# Patient Record
Sex: MALE | Race: WHITE | NOT HISPANIC OR LATINO | Employment: UNEMPLOYED | URBAN - METROPOLITAN AREA
[De-identification: names, ages, dates, MRNs, and addresses within clinical notes are randomized per-mention and may not be internally consistent; named-entity substitution may affect disease eponyms.]

---

## 2019-02-02 ENCOUNTER — PATIENT OUTREACH (OUTPATIENT)
Dept: HEALTH INFORMATION MANAGEMENT | Facility: OTHER | Age: 59
End: 2019-02-02

## 2019-02-02 ENCOUNTER — HOSPITAL ENCOUNTER (EMERGENCY)
Facility: MEDICAL CENTER | Age: 59
End: 2019-02-02
Attending: EMERGENCY MEDICINE
Payer: MEDICAID

## 2019-02-02 VITALS
DIASTOLIC BLOOD PRESSURE: 94 MMHG | BODY MASS INDEX: 22.72 KG/M2 | SYSTOLIC BLOOD PRESSURE: 118 MMHG | HEART RATE: 88 BPM | OXYGEN SATURATION: 90 % | WEIGHT: 167.55 LBS | RESPIRATION RATE: 16 BRPM | TEMPERATURE: 98.2 F

## 2019-02-02 DIAGNOSIS — M1A.9XX1 CHRONIC GOUT WITH TOPHUS, UNSPECIFIED CAUSE, UNSPECIFIED SITE: ICD-10-CM

## 2019-02-02 LAB
ALBUMIN SERPL BCP-MCNC: 4.1 G/DL (ref 3.2–4.9)
ALBUMIN/GLOB SERPL: 1.2 G/DL
ALP SERPL-CCNC: 169 U/L (ref 30–99)
ALT SERPL-CCNC: 16 U/L (ref 2–50)
ANION GAP SERPL CALC-SCNC: 10 MMOL/L (ref 0–11.9)
AST SERPL-CCNC: 16 U/L (ref 12–45)
BASOPHILS # BLD AUTO: 0.3 % (ref 0–1.8)
BASOPHILS # BLD: 0.03 K/UL (ref 0–0.12)
BILIRUB SERPL-MCNC: 0.6 MG/DL (ref 0.1–1.5)
BUN SERPL-MCNC: 16 MG/DL (ref 8–22)
CALCIUM SERPL-MCNC: 10.2 MG/DL (ref 8.5–10.5)
CHLORIDE SERPL-SCNC: 103 MMOL/L (ref 96–112)
CO2 SERPL-SCNC: 21 MMOL/L (ref 20–33)
CREAT SERPL-MCNC: 0.94 MG/DL (ref 0.5–1.4)
EOSINOPHIL # BLD AUTO: 0.17 K/UL (ref 0–0.51)
EOSINOPHIL NFR BLD: 1.7 % (ref 0–6.9)
ERYTHROCYTE [DISTWIDTH] IN BLOOD BY AUTOMATED COUNT: 47.9 FL (ref 35.9–50)
GLOBULIN SER CALC-MCNC: 3.4 G/DL (ref 1.9–3.5)
GLUCOSE SERPL-MCNC: 95 MG/DL (ref 65–99)
HCT VFR BLD AUTO: 41.5 % (ref 42–52)
HGB BLD-MCNC: 14 G/DL (ref 14–18)
IMM GRANULOCYTES # BLD AUTO: 0.07 K/UL (ref 0–0.11)
IMM GRANULOCYTES NFR BLD AUTO: 0.7 % (ref 0–0.9)
LYMPHOCYTES # BLD AUTO: 1.82 K/UL (ref 1–4.8)
LYMPHOCYTES NFR BLD: 18.2 % (ref 22–41)
MCH RBC QN AUTO: 29.4 PG (ref 27–33)
MCHC RBC AUTO-ENTMCNC: 33.7 G/DL (ref 33.7–35.3)
MCV RBC AUTO: 87.2 FL (ref 81.4–97.8)
MONOCYTES # BLD AUTO: 0.55 K/UL (ref 0–0.85)
MONOCYTES NFR BLD AUTO: 5.5 % (ref 0–13.4)
NEUTROPHILS # BLD AUTO: 7.38 K/UL (ref 1.82–7.42)
NEUTROPHILS NFR BLD: 73.6 % (ref 44–72)
NRBC # BLD AUTO: 0 K/UL
NRBC BLD-RTO: 0 /100 WBC
PLATELET # BLD AUTO: 451 K/UL (ref 164–446)
PMV BLD AUTO: 9.6 FL (ref 9–12.9)
POTASSIUM SERPL-SCNC: 4 MMOL/L (ref 3.6–5.5)
PROT SERPL-MCNC: 7.5 G/DL (ref 6–8.2)
RBC # BLD AUTO: 4.76 M/UL (ref 4.7–6.1)
SODIUM SERPL-SCNC: 134 MMOL/L (ref 135–145)
WBC # BLD AUTO: 10 K/UL (ref 4.8–10.8)

## 2019-02-02 PROCEDURE — 80053 COMPREHEN METABOLIC PANEL: CPT

## 2019-02-02 PROCEDURE — 85025 COMPLETE CBC W/AUTO DIFF WBC: CPT

## 2019-02-02 PROCEDURE — 99284 EMERGENCY DEPT VISIT MOD MDM: CPT

## 2019-02-02 PROCEDURE — 36415 COLL VENOUS BLD VENIPUNCTURE: CPT

## 2019-02-02 RX ORDER — INDOMETHACIN 50 MG/1
50 CAPSULE ORAL 3 TIMES DAILY
Qty: 21 CAP | Refills: 0 | Status: SHIPPED | OUTPATIENT
Start: 2019-02-02 | End: 2019-02-09

## 2019-02-02 ASSESSMENT — LIFESTYLE VARIABLES: DO YOU DRINK ALCOHOL: NO

## 2019-02-02 NOTE — DISCHARGE PLANNING
Called by Dr. Rodriguez requesting assistance for pateint to get Medicaid.  Emailed Patient Financial Assistance with patients information.

## 2019-02-02 NOTE — ED NOTES
Financial aide paper work provided to pt.  Discharge instructions given.  All questions answered.  Prescriptions given x1.  Pt to follow-up with PCP.  Pt verbalized understanding.  All belongings with pt.  Pt ambulated to lobby.

## 2019-02-02 NOTE — ED TRIAGE NOTES
Chief Complaint   Patient presents with   • Joint Pain     pt reports to have hx of Gout, reports to have increased symptoms with no relief.    • Weight Loss     25 lbs since Nov     Explained to pt triage process, made pt aware to tell this RN/staff of any changes/concerns, pt verbalized understanding of process and instructions given. Pt to ER lobby.

## 2019-02-02 NOTE — ED PROVIDER NOTES
ED Provider Note      ER PROVIDER NOTE      CHIEF COMPLAINT  Chief Complaint   Patient presents with   • Joint Pain     pt reports to have hx of Gout, reports to have increased symptoms with no relief.    • Weight Loss     25 lbs since Nov       HPI  Marbin Ladd is a 58 y.o. male who presents to the emergency department complaining of joint pain weight loss.  Patient reports that he has long-standing history of gout and seems to be poorly controlled he does not currently have a physician or way to manage it.  He states he had a flare in the spring lost approximately 10 pounds during that flare, had another flare this fall lost approximately 15 pounds then.  These current symptoms began a few weeks ago, primarily right knee pain which is often where he gets the symptoms.  He does state he has swelling to his right elbow and first digit on right hand which is chronic and do not currently bother him.  He denies any fevers or chills, no nausea or vomiting, no abdominal pain no abnormal rashes.  No cough chest pain or shortness of breath    REVIEW OF SYSTEMS  Pertinent positives include joint pain. Pertinent negatives include no fever. See HPI for details. All other systems reviewed and are negative.    PAST MEDICAL HISTORY   has a past medical history of Gout.    SURGICAL HISTORY  patient denies any surgical history    FAMILY HISTORY  No family history on file.    SOCIAL HISTORY  Social History     Social History   • Marital status: Single     Spouse name: N/A   • Number of children: N/A   • Years of education: N/A     Social History Main Topics   • Smoking status: Never Smoker   • Smokeless tobacco: Never Used   • Alcohol use Yes      Comment: rare   • Drug use: Yes     Types: Inhaled      Comment: thc rare   • Sexual activity: Not on file     Other Topics Concern   • Not on file     Social History Narrative   • No narrative on file      History   Drug Use   • Types: Inhaled     Comment: thc rare       CURRENT  MEDICATIONS  Home Medications    **Home medications have not yet been reviewed for this encounter**         ALLERGIES  Allergies   Allergen Reactions   • Vicodin [Hydrocodone-Acetaminophen]        PHYSICAL EXAM  VITAL SIGNS: /97   Pulse 90   Temp 36.8 °C (98.2 °F) (Temporal)   Resp 15   Wt 76 kg (167 lb 8.8 oz)   SpO2 97%   BMI 22.72 kg/m²   Pulse ox interpretation: I interpret this pulse ox as normal.    Constitutional: Alert in no apparent distress.  HENT: No signs of trauma, Bilateral external ears normal, Nose normal.   Eyes: Pupils are equal and reactive, Conjunctiva normal, Non-icteric.   Neck: Normal range of motion, No tenderness, Supple, No stridor.   Lymphatic: No lymphadenopathy noted.   Cardiovascular: Regular rate and rhythm, no murmurs.   Thorax & Lungs: Normal breath sounds, No respiratory distress, No wheezing, No chest tenderness.   Abdomen: Bowel sounds normal, Soft, No tenderness, No masses, No pulsatile masses. No peritoneal signs.  Skin: Warm, Dry, No erythema, No rash.   Back: No bony tenderness, No CVA tenderness.   Extremities: Tophi noted of her right elbow, nontender, no erythema, additional 2 DIP of second digit on right hand, nontender, no erythema, slight effusion to right knee with some associated tenderness, no erythema or excessive warmth intact distal pulses, No edema, No tenderness, No cyanosis, Negative Kathleen's sign.  Musculoskeletal: Good range of motion in all major joints. No tenderness to palpation or major deformities noted.   Neurologic: Alert , Normal motor function, Normal sensory function, No focal deficits noted.   Psychiatric: Affect normal, Judgment normal, Mood normal.     DIAGNOSTIC STUDIES / PROCEDURES        LABS  Labs Reviewed   CBC WITH DIFFERENTIAL - Abnormal; Notable for the following:        Result Value    Hematocrit 41.5 (*)     Platelet Count 451 (*)     Neutrophils-Polys 73.60 (*)     Lymphocytes 18.20 (*)     All other components within normal  limits   COMP METABOLIC PANEL - Abnormal; Notable for the following:     Sodium 134 (*)     Alkaline Phosphatase 169 (*)     All other components within normal limits   ESTIMATED GFR       All labs reviewed by me.    RADIOLOGY  No orders to display     The radiologist's interpretation of all radiological studies have been reviewed by me.    COURSE & MEDICAL DECISION MAKING  Nursing notes, VS, PMSFHx reviewed in chart.    7:33 AM Patient seen and examined at bedside. . Ordered for labs to evaluate his symptoms.     Called case management to arrange his enrollment in Medicaid to facilitate follow-up      Decision Making:  This is a 58 y.o. male presenting with joint pain.  Patient has history of gout and his physical exam and nature of his symptoms is suggestive of gout flare.  I will start him on indomethacin.  He has no fevers or exam findings suggestive of septic arthritis.  He has had some weight loss over the last year, likely due to the fact that when he gets his flares he just lays around in bed he reports.  However more occult causes considered and I detect no emergent cause, arrange for primary care follow-up for the patient   The patient will return for new or worsening symptoms and is stable at the time of discharge.    The patient is referred to a primary physician for blood pressure management, diabetic screening, and for all other preventative health concerns.      DISPOSITION:  Patient will be discharged home in stable condition.    FOLLOW UP:  [address] [address] [phone]    PLease call to schedule a new Primary Care Provider appointment, [facility] has a sliding fee scale thank you       OUTPATIENT MEDICATIONS:  Current Discharge Medication List            FINAL IMPRESSION  1. Chronic gout with tophus, unspecified cause, unspecified site         The note accurately reflects work and decisions made by me.  Umer Rodriguez  2/2/2019  8:59 AM

## 2019-02-18 ENCOUNTER — HOSPITAL ENCOUNTER (INPATIENT)
Facility: MEDICAL CENTER | Age: 59
LOS: 8 days | DRG: 329 | End: 2019-02-26
Attending: EMERGENCY MEDICINE | Admitting: SURGERY
Payer: MEDICAID

## 2019-02-18 ENCOUNTER — APPOINTMENT (OUTPATIENT)
Dept: RADIOLOGY | Facility: MEDICAL CENTER | Age: 59
DRG: 329 | End: 2019-02-18
Attending: EMERGENCY MEDICINE
Payer: MEDICAID

## 2019-02-18 DIAGNOSIS — G89.18 PAIN FOLLOWING SURGERY OR PROCEDURE: ICD-10-CM

## 2019-02-18 DIAGNOSIS — K57.20 DIVERTICULITIS OF LARGE INTESTINE WITH PERFORATION, UNSPECIFIED BLEEDING STATUS: ICD-10-CM

## 2019-02-18 LAB
ALBUMIN SERPL BCP-MCNC: 3.9 G/DL (ref 3.2–4.9)
ALBUMIN/GLOB SERPL: 1.1 G/DL
ALP SERPL-CCNC: 159 U/L (ref 30–99)
ALT SERPL-CCNC: 17 U/L (ref 2–50)
ANION GAP SERPL CALC-SCNC: 13 MMOL/L (ref 0–11.9)
APTT PPP: 32.7 SEC (ref 24.7–36)
AST SERPL-CCNC: 12 U/L (ref 12–45)
BASOPHILS # BLD AUTO: 0.6 % (ref 0–1.8)
BASOPHILS # BLD: 0.13 K/UL (ref 0–0.12)
BILIRUB SERPL-MCNC: 2.3 MG/DL (ref 0.1–1.5)
BUN SERPL-MCNC: 18 MG/DL (ref 8–22)
CALCIUM SERPL-MCNC: 10.7 MG/DL (ref 8.5–10.5)
CHLORIDE SERPL-SCNC: 101 MMOL/L (ref 96–112)
CO2 SERPL-SCNC: 23 MMOL/L (ref 20–33)
CREAT SERPL-MCNC: 0.97 MG/DL (ref 0.5–1.4)
EOSINOPHIL # BLD AUTO: 0 K/UL (ref 0–0.51)
EOSINOPHIL NFR BLD: 0 % (ref 0–6.9)
ERYTHROCYTE [DISTWIDTH] IN BLOOD BY AUTOMATED COUNT: 49.9 FL (ref 35.9–50)
GLOBULIN SER CALC-MCNC: 3.5 G/DL (ref 1.9–3.5)
GLUCOSE SERPL-MCNC: 113 MG/DL (ref 65–99)
GRAM STN SPEC: NORMAL
HCT VFR BLD AUTO: 40.5 % (ref 42–52)
HGB BLD-MCNC: 13.3 G/DL (ref 14–18)
IMM GRANULOCYTES # BLD AUTO: 0.31 K/UL (ref 0–0.11)
IMM GRANULOCYTES NFR BLD AUTO: 1.3 % (ref 0–0.9)
INR PPP: 1.23 (ref 0.87–1.13)
LIPASE SERPL-CCNC: <3 U/L (ref 11–82)
LYMPHOCYTES # BLD AUTO: 1.2 K/UL (ref 1–4.8)
LYMPHOCYTES NFR BLD: 5.1 % (ref 22–41)
MCH RBC QN AUTO: 28.5 PG (ref 27–33)
MCHC RBC AUTO-ENTMCNC: 32.8 G/DL (ref 33.7–35.3)
MCV RBC AUTO: 86.9 FL (ref 81.4–97.8)
MONOCYTES # BLD AUTO: 0.98 K/UL (ref 0–0.85)
MONOCYTES NFR BLD AUTO: 4.2 % (ref 0–13.4)
NEUTROPHILS # BLD AUTO: 20.98 K/UL (ref 1.82–7.42)
NEUTROPHILS NFR BLD: 88.8 % (ref 44–72)
NRBC # BLD AUTO: 0 K/UL
NRBC BLD-RTO: 0 /100 WBC
PLATELET # BLD AUTO: 482 K/UL (ref 164–446)
PMV BLD AUTO: 10 FL (ref 9–12.9)
POTASSIUM SERPL-SCNC: 3.2 MMOL/L (ref 3.6–5.5)
PROT SERPL-MCNC: 7.4 G/DL (ref 6–8.2)
PROTHROMBIN TIME: 15.6 SEC (ref 12–14.6)
RBC # BLD AUTO: 4.66 M/UL (ref 4.7–6.1)
SIGNIFICANT IND 70042: NORMAL
SITE SITE: NORMAL
SODIUM SERPL-SCNC: 137 MMOL/L (ref 135–145)
SOURCE SOURCE: NORMAL
WBC # BLD AUTO: 23.6 K/UL (ref 4.8–10.8)

## 2019-02-18 PROCEDURE — 83690 ASSAY OF LIPASE: CPT

## 2019-02-18 PROCEDURE — C1765 ADHESION BARRIER: HCPCS | Performed by: SURGERY

## 2019-02-18 PROCEDURE — 160041 HCHG SURGERY MINUTES - EA ADDL 1 MIN LEVEL 4: Performed by: SURGERY

## 2019-02-18 PROCEDURE — 700111 HCHG RX REV CODE 636 W/ 250 OVERRIDE (IP): Performed by: EMERGENCY MEDICINE

## 2019-02-18 PROCEDURE — 3E0M05Z INTRODUCTION OF ADHESION BARRIER INTO PERITONEAL CAVITY, OPEN APPROACH: ICD-10-PCS | Performed by: SURGERY

## 2019-02-18 PROCEDURE — 85025 COMPLETE CBC W/AUTO DIFF WBC: CPT

## 2019-02-18 PROCEDURE — 74177 CT ABD & PELVIS W/CONTRAST: CPT

## 2019-02-18 PROCEDURE — 85610 PROTHROMBIN TIME: CPT

## 2019-02-18 PROCEDURE — 501583 HCHG TROCAR, THRD CAN&SEAL 5X100: Performed by: SURGERY

## 2019-02-18 PROCEDURE — 80053 COMPREHEN METABOLIC PANEL: CPT

## 2019-02-18 PROCEDURE — 160029 HCHG SURGERY MINUTES - 1ST 30 MINS LEVEL 4: Performed by: SURGERY

## 2019-02-18 PROCEDURE — 502704 HCHG DEVICE, LIGASURE IMPACT: Performed by: SURGERY

## 2019-02-18 PROCEDURE — A9270 NON-COVERED ITEM OR SERVICE: HCPCS | Performed by: ANESTHESIOLOGY

## 2019-02-18 PROCEDURE — 96375 TX/PRO/DX INJ NEW DRUG ADDON: CPT

## 2019-02-18 PROCEDURE — 160048 HCHG OR STATISTICAL LEVEL 1-5: Performed by: SURGERY

## 2019-02-18 PROCEDURE — 502571 HCHG PACK, LAP CHOLE: Performed by: SURGERY

## 2019-02-18 PROCEDURE — 0DTN0ZZ RESECTION OF SIGMOID COLON, OPEN APPROACH: ICD-10-PCS | Performed by: SURGERY

## 2019-02-18 PROCEDURE — 87075 CULTR BACTERIA EXCEPT BLOOD: CPT

## 2019-02-18 PROCEDURE — 94760 N-INVAS EAR/PLS OXIMETRY 1: CPT

## 2019-02-18 PROCEDURE — 501838 HCHG SUTURE GENERAL: Performed by: SURGERY

## 2019-02-18 PROCEDURE — 501445 HCHG STAPLER, SKIN DISP: Performed by: SURGERY

## 2019-02-18 PROCEDURE — 501572 HCHG TROCAR, SHIELD OBTU 5X100: Performed by: SURGERY

## 2019-02-18 PROCEDURE — 700117 HCHG RX CONTRAST REV CODE 255: Performed by: EMERGENCY MEDICINE

## 2019-02-18 PROCEDURE — 36415 COLL VENOUS BLD VENIPUNCTURE: CPT

## 2019-02-18 PROCEDURE — 160035 HCHG PACU - 1ST 60 MINS PHASE I: Performed by: SURGERY

## 2019-02-18 PROCEDURE — 700102 HCHG RX REV CODE 250 W/ 637 OVERRIDE(OP): Performed by: ANESTHESIOLOGY

## 2019-02-18 PROCEDURE — 700101 HCHG RX REV CODE 250

## 2019-02-18 PROCEDURE — 99291 CRITICAL CARE FIRST HOUR: CPT

## 2019-02-18 PROCEDURE — 88307 TISSUE EXAM BY PATHOLOGIST: CPT

## 2019-02-18 PROCEDURE — 160036 HCHG PACU - EA ADDL 30 MINS PHASE I: Performed by: SURGERY

## 2019-02-18 PROCEDURE — 500868 HCHG NEEDLE, SURGI(VARES): Performed by: SURGERY

## 2019-02-18 PROCEDURE — 160022 HCHG BLOCK: Performed by: SURGERY

## 2019-02-18 PROCEDURE — 96376 TX/PRO/DX INJ SAME DRUG ADON: CPT

## 2019-02-18 PROCEDURE — 501452 HCHG STAPLES, GIA MULTIFIRE 60/80: Performed by: SURGERY

## 2019-02-18 PROCEDURE — 700105 HCHG RX REV CODE 258: Performed by: SURGERY

## 2019-02-18 PROCEDURE — 87070 CULTURE OTHR SPECIMN AEROBIC: CPT

## 2019-02-18 PROCEDURE — 700105 HCHG RX REV CODE 258: Performed by: EMERGENCY MEDICINE

## 2019-02-18 PROCEDURE — 160002 HCHG RECOVERY MINUTES (STAT): Performed by: SURGERY

## 2019-02-18 PROCEDURE — 160009 HCHG ANES TIME/MIN: Performed by: SURGERY

## 2019-02-18 PROCEDURE — 0D1N0Z4 BYPASS SIGMOID COLON TO CUTANEOUS, OPEN APPROACH: ICD-10-PCS | Performed by: SURGERY

## 2019-02-18 PROCEDURE — 700111 HCHG RX REV CODE 636 W/ 250 OVERRIDE (IP): Performed by: SURGERY

## 2019-02-18 PROCEDURE — 87205 SMEAR GRAM STAIN: CPT

## 2019-02-18 PROCEDURE — 501433 HCHG STAPLER, GIA MULTIFIRE 60/80: Performed by: SURGERY

## 2019-02-18 PROCEDURE — 87186 SC STD MICRODIL/AGAR DIL: CPT

## 2019-02-18 PROCEDURE — 700111 HCHG RX REV CODE 636 W/ 250 OVERRIDE (IP): Performed by: ANESTHESIOLOGY

## 2019-02-18 PROCEDURE — 87077 CULTURE AEROBIC IDENTIFY: CPT

## 2019-02-18 PROCEDURE — 85730 THROMBOPLASTIN TIME PARTIAL: CPT

## 2019-02-18 PROCEDURE — 770006 HCHG ROOM/CARE - MED/SURG/GYN SEMI*

## 2019-02-18 PROCEDURE — 700111 HCHG RX REV CODE 636 W/ 250 OVERRIDE (IP)

## 2019-02-18 PROCEDURE — 96374 THER/PROPH/DIAG INJ IV PUSH: CPT

## 2019-02-18 RX ORDER — DIPHENHYDRAMINE HYDROCHLORIDE 50 MG/ML
25 INJECTION INTRAMUSCULAR; INTRAVENOUS EVERY 6 HOURS PRN
Status: DISCONTINUED | OUTPATIENT
Start: 2019-02-18 | End: 2019-02-26 | Stop reason: HOSPADM

## 2019-02-18 RX ORDER — HALOPERIDOL 5 MG/ML
1 INJECTION INTRAMUSCULAR
Status: DISCONTINUED | OUTPATIENT
Start: 2019-02-18 | End: 2019-02-18 | Stop reason: HOSPADM

## 2019-02-18 RX ORDER — ONDANSETRON 2 MG/ML
4 INJECTION INTRAMUSCULAR; INTRAVENOUS
Status: DISCONTINUED | OUTPATIENT
Start: 2019-02-18 | End: 2019-02-18 | Stop reason: HOSPADM

## 2019-02-18 RX ORDER — SODIUM CHLORIDE 9 MG/ML
INJECTION, SOLUTION INTRAVENOUS CONTINUOUS
Status: DISCONTINUED | OUTPATIENT
Start: 2019-02-18 | End: 2019-02-23

## 2019-02-18 RX ORDER — INDOMETHACIN 50 MG/1
50 CAPSULE ORAL EVERY 6 HOURS
COMMUNITY
End: 2019-06-06

## 2019-02-18 RX ORDER — HYDROMORPHONE HYDROCHLORIDE 1 MG/ML
0.1 INJECTION, SOLUTION INTRAMUSCULAR; INTRAVENOUS; SUBCUTANEOUS
Status: DISCONTINUED | OUTPATIENT
Start: 2019-02-18 | End: 2019-02-18 | Stop reason: HOSPADM

## 2019-02-18 RX ORDER — SODIUM CHLORIDE, SODIUM LACTATE, POTASSIUM CHLORIDE, CALCIUM CHLORIDE 600; 310; 30; 20 MG/100ML; MG/100ML; MG/100ML; MG/100ML
1000 INJECTION, SOLUTION INTRAVENOUS CONTINUOUS
Status: DISCONTINUED | OUTPATIENT
Start: 2019-02-18 | End: 2019-02-18 | Stop reason: HOSPADM

## 2019-02-18 RX ORDER — MORPHINE SULFATE 4 MG/ML
4 INJECTION, SOLUTION INTRAMUSCULAR; INTRAVENOUS ONCE
Status: COMPLETED | OUTPATIENT
Start: 2019-02-18 | End: 2019-02-18

## 2019-02-18 RX ORDER — SODIUM CHLORIDE, SODIUM LACTATE, POTASSIUM CHLORIDE, CALCIUM CHLORIDE 600; 310; 30; 20 MG/100ML; MG/100ML; MG/100ML; MG/100ML
INJECTION, SOLUTION INTRAVENOUS CONTINUOUS
Status: DISCONTINUED | OUTPATIENT
Start: 2019-02-18 | End: 2019-02-23

## 2019-02-18 RX ORDER — SCOLOPAMINE TRANSDERMAL SYSTEM 1 MG/1
1 PATCH, EXTENDED RELEASE TRANSDERMAL
Status: DISCONTINUED | OUTPATIENT
Start: 2019-02-18 | End: 2019-02-26 | Stop reason: HOSPADM

## 2019-02-18 RX ORDER — MEPERIDINE HYDROCHLORIDE 25 MG/ML
12.5 INJECTION INTRAMUSCULAR; INTRAVENOUS; SUBCUTANEOUS
Status: DISCONTINUED | OUTPATIENT
Start: 2019-02-18 | End: 2019-02-18 | Stop reason: HOSPADM

## 2019-02-18 RX ORDER — MORPHINE SULFATE 4 MG/ML
4 INJECTION, SOLUTION INTRAMUSCULAR; INTRAVENOUS
Status: DISCONTINUED | OUTPATIENT
Start: 2019-02-18 | End: 2019-02-25

## 2019-02-18 RX ORDER — KETOROLAC TROMETHAMINE 30 MG/ML
30 INJECTION, SOLUTION INTRAMUSCULAR; INTRAVENOUS ONCE
Status: COMPLETED | OUTPATIENT
Start: 2019-02-18 | End: 2019-02-18

## 2019-02-18 RX ORDER — BUPIVACAINE HYDROCHLORIDE 2.5 MG/ML
INJECTION, SOLUTION EPIDURAL; INFILTRATION; INTRACAUDAL
Status: DISCONTINUED | OUTPATIENT
Start: 2019-02-18 | End: 2019-02-18 | Stop reason: HOSPADM

## 2019-02-18 RX ORDER — KETOROLAC TROMETHAMINE 30 MG/ML
30 INJECTION, SOLUTION INTRAMUSCULAR; INTRAVENOUS EVERY 6 HOURS
Status: DISPENSED | OUTPATIENT
Start: 2019-02-18 | End: 2019-02-21

## 2019-02-18 RX ORDER — MAGNESIUM HYDROXIDE 1200 MG/15ML
LIQUID ORAL
Status: COMPLETED | OUTPATIENT
Start: 2019-02-18 | End: 2019-02-18

## 2019-02-18 RX ORDER — IBUPROFEN 200 MG
800 TABLET ORAL EVERY 6 HOURS PRN
COMMUNITY
End: 2019-03-06

## 2019-02-18 RX ORDER — ONDANSETRON 2 MG/ML
4 INJECTION INTRAMUSCULAR; INTRAVENOUS EVERY 4 HOURS PRN
Status: DISCONTINUED | OUTPATIENT
Start: 2019-02-18 | End: 2019-02-26 | Stop reason: HOSPADM

## 2019-02-18 RX ORDER — DEXAMETHASONE SODIUM PHOSPHATE 4 MG/ML
4 INJECTION, SOLUTION INTRA-ARTICULAR; INTRALESIONAL; INTRAMUSCULAR; INTRAVENOUS; SOFT TISSUE
Status: COMPLETED | OUTPATIENT
Start: 2019-02-18 | End: 2019-02-21

## 2019-02-18 RX ORDER — HALOPERIDOL 5 MG/ML
1 INJECTION INTRAMUSCULAR EVERY 6 HOURS PRN
Status: DISCONTINUED | OUTPATIENT
Start: 2019-02-18 | End: 2019-02-26 | Stop reason: HOSPADM

## 2019-02-18 RX ORDER — DIPHENHYDRAMINE HYDROCHLORIDE 50 MG/ML
12.5 INJECTION INTRAMUSCULAR; INTRAVENOUS
Status: DISCONTINUED | OUTPATIENT
Start: 2019-02-18 | End: 2019-02-18 | Stop reason: HOSPADM

## 2019-02-18 RX ORDER — ALLOPURINOL 100 MG/1
200 TABLET ORAL DAILY
COMMUNITY
End: 2019-06-06

## 2019-02-18 RX ORDER — ONDANSETRON 2 MG/ML
4 INJECTION INTRAMUSCULAR; INTRAVENOUS ONCE
Status: COMPLETED | OUTPATIENT
Start: 2019-02-18 | End: 2019-02-18

## 2019-02-18 RX ORDER — OXYCODONE HCL 5 MG/5 ML
5 SOLUTION, ORAL ORAL
Status: COMPLETED | OUTPATIENT
Start: 2019-02-18 | End: 2019-02-18

## 2019-02-18 RX ORDER — HYDROMORPHONE HYDROCHLORIDE 1 MG/ML
0.4 INJECTION, SOLUTION INTRAMUSCULAR; INTRAVENOUS; SUBCUTANEOUS
Status: DISCONTINUED | OUTPATIENT
Start: 2019-02-18 | End: 2019-02-18 | Stop reason: HOSPADM

## 2019-02-18 RX ORDER — HYDROMORPHONE HYDROCHLORIDE 1 MG/ML
0.2 INJECTION, SOLUTION INTRAMUSCULAR; INTRAVENOUS; SUBCUTANEOUS
Status: DISCONTINUED | OUTPATIENT
Start: 2019-02-18 | End: 2019-02-18 | Stop reason: HOSPADM

## 2019-02-18 RX ORDER — OXYCODONE HCL 5 MG/5 ML
10 SOLUTION, ORAL ORAL
Status: COMPLETED | OUTPATIENT
Start: 2019-02-18 | End: 2019-02-18

## 2019-02-18 RX ADMIN — MORPHINE SULFATE 4 MG: 4 INJECTION INTRAVENOUS at 14:30

## 2019-02-18 RX ADMIN — OXYCODONE HYDROCHLORIDE 5 MG: 5 SOLUTION ORAL at 18:55

## 2019-02-18 RX ADMIN — KETOROLAC TROMETHAMINE 30 MG: 30 INJECTION, SOLUTION INTRAMUSCULAR; INTRAVENOUS at 21:00

## 2019-02-18 RX ADMIN — SODIUM CHLORIDE: 900 INJECTION INTRAVENOUS at 13:32

## 2019-02-18 RX ADMIN — IOHEXOL 100 ML: 350 INJECTION, SOLUTION INTRAVENOUS at 14:00

## 2019-02-18 RX ADMIN — KETOROLAC TROMETHAMINE 30 MG: 30 INJECTION, SOLUTION INTRAMUSCULAR at 18:18

## 2019-02-18 RX ADMIN — FENTANYL CITRATE 50 MCG: 50 INJECTION, SOLUTION INTRAMUSCULAR; INTRAVENOUS at 18:45

## 2019-02-18 RX ADMIN — MORPHINE SULFATE 4 MG: 4 INJECTION INTRAVENOUS at 13:32

## 2019-02-18 RX ADMIN — PIPERACILLIN SODIUM AND TAZOBACTAM SODIUM 3.38 G: 3; .375 INJECTION, POWDER, FOR SOLUTION INTRAVENOUS at 19:35

## 2019-02-18 RX ADMIN — FENTANYL CITRATE 50 MCG: 50 INJECTION, SOLUTION INTRAMUSCULAR; INTRAVENOUS at 19:13

## 2019-02-18 RX ADMIN — SODIUM CHLORIDE, POTASSIUM CHLORIDE, SODIUM LACTATE AND CALCIUM CHLORIDE: 600; 310; 30; 20 INJECTION, SOLUTION INTRAVENOUS at 21:01

## 2019-02-18 RX ADMIN — PIPERACILLIN SODIUM AND TAZOBACTAM SODIUM 3.38 G: 3; .375 INJECTION, POWDER, FOR SOLUTION INTRAVENOUS at 21:00

## 2019-02-18 RX ADMIN — ONDANSETRON 4 MG: 2 INJECTION INTRAMUSCULAR; INTRAVENOUS at 13:49

## 2019-02-18 ASSESSMENT — COGNITIVE AND FUNCTIONAL STATUS - GENERAL
HELP NEEDED FOR BATHING: A LITTLE
SUGGESTED CMS G CODE MODIFIER MOBILITY: CK
MOVING FROM LYING ON BACK TO SITTING ON SIDE OF FLAT BED: A LITTLE
STANDING UP FROM CHAIR USING ARMS: A LITTLE
SUGGESTED CMS G CODE MODIFIER DAILY ACTIVITY: CI
CLIMB 3 TO 5 STEPS WITH RAILING: A LITTLE
DAILY ACTIVITIY SCORE: 23
MOBILITY SCORE: 19
MOVING TO AND FROM BED TO CHAIR: A LITTLE
WALKING IN HOSPITAL ROOM: A LITTLE

## 2019-02-18 ASSESSMENT — PATIENT HEALTH QUESTIONNAIRE - PHQ9
1. LITTLE INTEREST OR PLEASURE IN DOING THINGS: NOT AT ALL
SUM OF ALL RESPONSES TO PHQ9 QUESTIONS 1 AND 2: 0
2. FEELING DOWN, DEPRESSED, IRRITABLE, OR HOPELESS: NOT AT ALL

## 2019-02-18 ASSESSMENT — LIFESTYLE VARIABLES: EVER_SMOKED: NEVER

## 2019-02-18 NOTE — ED NOTES
Pt wheeled to hospital room without incidence. Pt states abdominal pain started getting worse on Saturday with no relief. Pt states he took stool softeners yesterday that helped a little bit. Pt states his pain is intermittent all across the span of abdomen. Pt c/o mild nausea. Pt hooked to monitor, resting on room air, aaox4.

## 2019-02-18 NOTE — ED TRIAGE NOTES
PT to triage c/o abd pain and constipation x 1 week.  PT attempted enemas at home with little success.    Chief Complaint   Patient presents with   • Abdominal Pain   • Constipation     last normal BM 1 week ago      Blood pressure 132/80, pulse (!) 116, temperature 36.9 °C (98.4 °F), temperature source Temporal, resp. rate 16, height 1.829 m (6'), weight 73.3 kg (161 lb 9.6 oz), SpO2 95 %.

## 2019-02-18 NOTE — ED NOTES
Pt medicated per MAR and educated on the purpose/side effects of medication. Denies further needs at this time.

## 2019-02-18 NOTE — PROGRESS NOTES
PT presents with 6 day hx of abd pain  CAO found WBC 23K and CT with multiple abscesses  Plan laparoscopy/laparotomy

## 2019-02-18 NOTE — ED PROVIDER NOTES
ED Provider Note    Scribed for Amparo Ascencio M.D. by Park Melendez. 2/18/2019, 12:39 PM.    Primary care provider: Pcp Pt States None  Means of arrival: Walk-In  History obtained from: Patient  History limited by: None    CHIEF COMPLAINT  Chief Complaint   Patient presents with   • Abdominal Pain   • Constipation     last normal BM 1 week ago      HPI  Marbin Ladd is a 58 y.o. male who presents to the Emergency Department complaining of intermittent progressively worsening bilateral lower abdominal pain onset 6 days ago. He states his pain started mildly in his lower abdomen but over the past 6 days has become so severe and is now radiating to his right shoulder. He reports a medical history of gout for the many years that has flared up 3 months ago and is concerned that the medication that he has been taking for his gout has now caused his abdominal pain. He has been taking allopurinol and sodium for his gout and has been off those medications for the past 4-5 days. He also reports self medicating with Ibuprofen for many years. Additionally, he has tried taking an enema for possible constipation. He does not feel like he needs to have a bowel movement. He has tried getting himself to vomit to relieve some of his pain but was unable to. Denies nausea, vomiting. Denies any previous abdominal surgeries.     REVIEW OF SYSTEMS  Pertinent positives include abdominal pain, constipation, gout flare up. Pertinent negatives include no nausea, vomiting, diarrhea. As above, all other systems reviewed and are negative.   See HPI for further details.     PAST MEDICAL HISTORY  Past Medical History:   Diagnosis Date   • Gout        SURGICAL HISTORY  Patient denies any abdominal surgeries      SOCIAL HISTORY  Social History   Substance Use Topics   • Smoking status: Never Smoker   • Smokeless tobacco: Never Used   • Alcohol use Yes      Comment: rare      History   Drug Use   • Types: Inhaled     Comment: thc rare        FAMILY HISTORY  History reviewed. No pertinent family history.    CURRENT MEDICATIONS  Colchicine  Percocet    ALLERGIES  Allergies   Allergen Reactions   • Vicodin [Hydrocodone-Acetaminophen]        PHYSICAL EXAM  VITAL SIGNS: /80   Pulse (!) 116   Temp 36.9 °C (98.4 °F) (Temporal)   Resp 16   Ht 1.829 m (6')   Wt 73.3 kg (161 lb 9.6 oz)   SpO2 95%   BMI 21.92 kg/m²   Vitals reviewed.    Consitutional: Well-developed, well-nourished. Negative for: distress.  HENT: Normocephalic, right external ear normal, left external ear normal, oropharynx clear and dry.  Eyes: Conjunctivae normal, extraocular movements normal. Negative for: discharge in right and left eye, icterus.  Neck: Range of motion normal, supple. Negative for cervical adenopathy.  Cardiovascular: Tachycardic, regular rhythm, heart sounds normal, intact distal pulses. Negative for: murmur, rub, gallop.  Pulmonary/Chest Wall: Effort normal, breath sounds normal. Negative for: respiratory distress, wheezes, rales, rhonchi.   Abdominal: Tenderness throughout the entire lower abdomen. Mild distention to bilateral lower quadrant. Soft, hypoactive bowel sounds. Negative for: rebound, guarding.  Musculoskeletal: Normal range of motion. Negative for edema. Multiple tophaceous nodules to elbows, hands, knees.  Neurological: Alert and oriented x3. No focal deficits.  Skin: Warm, dry. Negative for rash.  Psych: Anxious, aggressive.      DIAGNOSTIC STUDIES / PROCEDURES    LABS  Results for orders placed or performed during the hospital encounter of 02/18/19   CBC WITH DIFFERENTIAL   Result Value Ref Range    WBC 23.6 (H) 4.8 - 10.8 K/uL    RBC 4.66 (L) 4.70 - 6.10 M/uL    Hemoglobin 13.3 (L) 14.0 - 18.0 g/dL    Hematocrit 40.5 (L) 42.0 - 52.0 %    MCV 86.9 81.4 - 97.8 fL    MCH 28.5 27.0 - 33.0 pg    MCHC 32.8 (L) 33.7 - 35.3 g/dL    RDW 49.9 35.9 - 50.0 fL    Platelet Count 482 (H) 164 - 446 K/uL    MPV 10.0 9.0 - 12.9 fL    Neutrophils-Polys 88.80  (H) 44.00 - 72.00 %    Lymphocytes 5.10 (L) 22.00 - 41.00 %    Monocytes 4.20 0.00 - 13.40 %    Eosinophils 0.00 0.00 - 6.90 %    Basophils 0.60 0.00 - 1.80 %    Immature Granulocytes 1.30 (H) 0.00 - 0.90 %    Nucleated RBC 0.00 /100 WBC    Neutrophils (Absolute) 20.98 (H) 1.82 - 7.42 K/uL    Lymphs (Absolute) 1.20 1.00 - 4.80 K/uL    Monos (Absolute) 0.98 (H) 0.00 - 0.85 K/uL    Eos (Absolute) 0.00 0.00 - 0.51 K/uL    Baso (Absolute) 0.13 (H) 0.00 - 0.12 K/uL    Immature Granulocytes (abs) 0.31 (H) 0.00 - 0.11 K/uL    NRBC (Absolute) 0.00 K/uL   COMP METABOLIC PANEL   Result Value Ref Range    Sodium 137 135 - 145 mmol/L    Potassium 3.2 (L) 3.6 - 5.5 mmol/L    Chloride 101 96 - 112 mmol/L    Co2 23 20 - 33 mmol/L    Anion Gap 13.0 (H) 0.0 - 11.9    Glucose 113 (H) 65 - 99 mg/dL    Bun 18 8 - 22 mg/dL    Creatinine 0.97 0.50 - 1.40 mg/dL    Calcium 10.7 (H) 8.5 - 10.5 mg/dL    AST(SGOT) 12 12 - 45 U/L    ALT(SGPT) 17 2 - 50 U/L    Alkaline Phosphatase 159 (H) 30 - 99 U/L    Total Bilirubin 2.3 (H) 0.1 - 1.5 mg/dL    Albumin 3.9 3.2 - 4.9 g/dL    Total Protein 7.4 6.0 - 8.2 g/dL    Globulin 3.5 1.9 - 3.5 g/dL    A-G Ratio 1.1 g/dL   LIPASE   Result Value Ref Range    Lipase <3 (L) 11 - 82 U/L   PROTHROMBIN TIME (INR)   Result Value Ref Range    PT 15.6 (H) 12.0 - 14.6 sec    INR 1.23 (H) 0.87 - 1.13   APTT   Result Value Ref Range    APTT 32.7 24.7 - 36.0 sec   ESTIMATED GFR   Result Value Ref Range    GFR If African American >60 >60 mL/min/1.73 m 2    GFR If Non African American >60 >60 mL/min/1.73 m 2     All labs reviewed by me.    RADIOLOGY  CT-ABDOMEN-PELVIS WITH   Final Result      Fluid collections in the lower abdomen and pelvis. Several of these collections contain foci of air and these could be related to early abscess formation though currently do not demonstrate significant peripheral enhancement.      Findings may be related to perforated diverticulitis. There is wall thickening of the sigmoid colon  with multiple diverticula noted. The appendix is not identified and perforated appendicitis is not excluded.      Wall thickening of small bowel loops in the lower abdomen and pelvis may be reactive. Dilated small bowel loops likely represent ileus.      Tiny hypodense hepatic lesion is too small to characterize.      Bibasilar opacities likely represent atelectasis, right greater than left.      Tiny hypodense left renal lesions are too small to characterize but statistically likely represent cysts.      Splenomegaly.        The radiologist's interpretation of all radiological studies have been reviewed by me.    COURSE & MEDICAL DECISION MAKING  Nursing notes, VS, PMSFHx reviewed in chart.    12:39 PM - Patient seen and examined at bedside. Plan of care was discussed with the patient which includes ordering labs to evaluate his pancreas and liver and CT-abdomen to view his appendix. The patient presents with abdominal pain and constipation and the differential diagnosis includes but is not limited to appendicitis, diverticulitis, colitis, obstruction. Ordered CT-abdomen, CBC with differential, CMP, lipase, urinalysis, PT/INR, and APTT. Patient will be treated with IV fluids for tachycardia and NPO status, Zofran 4 mg, and morphine injection 4 mg for his symptoms.      2:23 PM - Paged general surgery.     2:56 PM - Consulted with Dr. Napoles (General surgery).     3:07 PM - Recheck. Patient's pain is improved after treated with pain medication. I have discussed the radiology results with him indicating a perforated appendix or perforated diverticulitis. There is concern for infection therefore he will be admitted for treatment and further observation. He verbalized his understanding and agrees to the treatment care plan and admission.  Patient was taken up to the OR before IV antibiotics could be administered.    HYDRATION: Based on the patient's presentation of Tachycardia and Other NPO  the patient was given IV  fluids. IV Hydration was used because oral hydration was not adequate alone. Upon recheck following hydration, the patient was continued on maintenance because he is n.p.o. for the OR.    DISPOSITION:  Patient will be admitted to Dr. Napoles (general surgery) in guarded condition.      FINAL IMPRESSION  Intra-abdominal abscess  Hypokalemia  Dehydration     Park WEAVER (Scribe), am scribing for, and in the presence of, mAparo Ascencio M.D..    Electronically signed by: Park Melendez (Scribe), 2/18/2019    IAmparo M.D. personally performed the services described in this documentation, as scribed by Park Melendez in my presence, and it is both accurate and complete. C    The note accurately reflects work and decisions made by me.  Amparo Ascencio  2/18/2019  7:48 PM

## 2019-02-18 NOTE — ED NOTES
Med Rec completed per patient and home pharmacy ([facility])   Allergies reviewed   No ORAL antibiotics in last 30 days

## 2019-02-19 LAB
ANION GAP SERPL CALC-SCNC: 10 MMOL/L (ref 0–11.9)
BUN SERPL-MCNC: 19 MG/DL (ref 8–22)
CALCIUM SERPL-MCNC: 9.4 MG/DL (ref 8.5–10.5)
CHLORIDE SERPL-SCNC: 103 MMOL/L (ref 96–112)
CO2 SERPL-SCNC: 23 MMOL/L (ref 20–33)
CREAT SERPL-MCNC: 1.03 MG/DL (ref 0.5–1.4)
ERYTHROCYTE [DISTWIDTH] IN BLOOD BY AUTOMATED COUNT: 52.1 FL (ref 35.9–50)
GLUCOSE SERPL-MCNC: 140 MG/DL (ref 65–99)
HCT VFR BLD AUTO: 34.4 % (ref 42–52)
HGB BLD-MCNC: 11.2 G/DL (ref 14–18)
MCH RBC QN AUTO: 29 PG (ref 27–33)
MCHC RBC AUTO-ENTMCNC: 32.6 G/DL (ref 33.7–35.3)
MCV RBC AUTO: 89.1 FL (ref 81.4–97.8)
PATHOLOGY CONSULT NOTE: NORMAL
PLATELET # BLD AUTO: 367 K/UL (ref 164–446)
PMV BLD AUTO: 10.3 FL (ref 9–12.9)
POTASSIUM SERPL-SCNC: 3.7 MMOL/L (ref 3.6–5.5)
RBC # BLD AUTO: 3.86 M/UL (ref 4.7–6.1)
SODIUM SERPL-SCNC: 136 MMOL/L (ref 135–145)
WBC # BLD AUTO: 19.4 K/UL (ref 4.8–10.8)

## 2019-02-19 PROCEDURE — 36415 COLL VENOUS BLD VENIPUNCTURE: CPT

## 2019-02-19 PROCEDURE — 700105 HCHG RX REV CODE 258: Performed by: EMERGENCY MEDICINE

## 2019-02-19 PROCEDURE — 85027 COMPLETE CBC AUTOMATED: CPT

## 2019-02-19 PROCEDURE — 700105 HCHG RX REV CODE 258

## 2019-02-19 PROCEDURE — 80048 BASIC METABOLIC PNL TOTAL CA: CPT

## 2019-02-19 PROCEDURE — 700111 HCHG RX REV CODE 636 W/ 250 OVERRIDE (IP): Performed by: SURGERY

## 2019-02-19 PROCEDURE — 51798 US URINE CAPACITY MEASURE: CPT

## 2019-02-19 PROCEDURE — 700105 HCHG RX REV CODE 258: Performed by: SURGERY

## 2019-02-19 PROCEDURE — 770006 HCHG ROOM/CARE - MED/SURG/GYN SEMI*

## 2019-02-19 RX ORDER — SODIUM CHLORIDE 9 MG/ML
INJECTION, SOLUTION INTRAVENOUS
Status: COMPLETED
Start: 2019-02-19 | End: 2019-02-19

## 2019-02-19 RX ADMIN — PIPERACILLIN SODIUM AND TAZOBACTAM SODIUM 3.38 G: 3; .375 INJECTION, POWDER, FOR SOLUTION INTRAVENOUS at 20:59

## 2019-02-19 RX ADMIN — SODIUM CHLORIDE: 900 INJECTION INTRAVENOUS at 15:12

## 2019-02-19 RX ADMIN — ENOXAPARIN SODIUM 40 MG: 100 INJECTION SUBCUTANEOUS at 03:52

## 2019-02-19 RX ADMIN — PIPERACILLIN SODIUM AND TAZOBACTAM SODIUM 3.38 G: 3; .375 INJECTION, POWDER, FOR SOLUTION INTRAVENOUS at 12:48

## 2019-02-19 RX ADMIN — MORPHINE SULFATE 4 MG: 4 INJECTION INTRAVENOUS at 08:53

## 2019-02-19 RX ADMIN — KETOROLAC TROMETHAMINE 30 MG: 30 INJECTION, SOLUTION INTRAMUSCULAR; INTRAVENOUS at 15:10

## 2019-02-19 RX ADMIN — MORPHINE SULFATE 4 MG: 4 INJECTION INTRAVENOUS at 00:01

## 2019-02-19 RX ADMIN — SODIUM CHLORIDE, POTASSIUM CHLORIDE, SODIUM LACTATE AND CALCIUM CHLORIDE: 600; 310; 30; 20 INJECTION, SOLUTION INTRAVENOUS at 03:57

## 2019-02-19 RX ADMIN — MORPHINE SULFATE 4 MG: 4 INJECTION INTRAVENOUS at 15:56

## 2019-02-19 RX ADMIN — MORPHINE SULFATE 4 MG: 4 INJECTION INTRAVENOUS at 21:48

## 2019-02-19 RX ADMIN — SODIUM CHLORIDE: 900 INJECTION INTRAVENOUS at 21:15

## 2019-02-19 RX ADMIN — KETOROLAC TROMETHAMINE 30 MG: 30 INJECTION, SOLUTION INTRAMUSCULAR; INTRAVENOUS at 20:59

## 2019-02-19 RX ADMIN — SODIUM CHLORIDE 500 ML: 9 INJECTION, SOLUTION INTRAVENOUS at 15:12

## 2019-02-19 RX ADMIN — MORPHINE SULFATE 4 MG: 4 INJECTION INTRAVENOUS at 03:57

## 2019-02-19 RX ADMIN — KETOROLAC TROMETHAMINE 30 MG: 30 INJECTION, SOLUTION INTRAMUSCULAR; INTRAVENOUS at 03:52

## 2019-02-19 RX ADMIN — MORPHINE SULFATE 4 MG: 4 INJECTION INTRAVENOUS at 12:48

## 2019-02-19 RX ADMIN — PIPERACILLIN SODIUM AND TAZOBACTAM SODIUM 3.38 G: 3; .375 INJECTION, POWDER, FOR SOLUTION INTRAVENOUS at 04:56

## 2019-02-19 RX ADMIN — KETOROLAC TROMETHAMINE 30 MG: 30 INJECTION, SOLUTION INTRAMUSCULAR; INTRAVENOUS at 08:53

## 2019-02-19 NOTE — PROGRESS NOTES
Patient arrived to room.  A&Ox4.  [place].  Denies pain at this time.    Ostomy   Flor  Family at bedside

## 2019-02-19 NOTE — OP REPORT
DATE OF SERVICE:  02/18/2019    PREOPERATIVE DIAGNOSIS:  Multiple intraabdominal abscesses.    POSTOPERATIVE DIAGNOSIS:  Perforated diverticulitis.    PROCEDURES PERFORMED:  Exploratory celiotomy, sigmoid colectomy with end   colostomy and washout of multiple abdominal abscesses.    SURGEON:  Dianna Napoles MD    ASSISTANT:  BRYANNA Kasper    ANESTHESIA:  General endotracheal.    ANESTHESIOLOGIST:  Orlin Saenz MD    INDICATIONS:  The patient is a 58-year-old gentleman who was seen in the   emergency room with a 6-day history of worsening abdominal pain and   constipation.  He has also had fevers.  He had a white count of 23,000.  CT   scan shows him to have multiple intraabdominal abscesses of either appendiceal   or diverticular source.  He is being brought at this time for exploration.    FINDINGS:  The abdomen was evaluated.  There were multiple intraabdominal   abscesses.  These were washed out.  The appendix was normal and the source of   this appeared to be perforated diverticulitis.  A sigmoid colectomy and end   colostomy was then performed.    DESCRIPTION OF PROCEDURE:  After the patient was identified and consented, he   was brought to the operating room and placed in the supine position.  The   patient underwent general endotracheal anesthetic clearance.  The patient's   abdomen was prepped and draped in sterile fashion.  After examining him while   he is asleep, he had obviously distended abdomen with palpable mass from the   abscess, it was elected not to consider a laparoscopic approach and a midline   incision was carried out.  Upon entering the abdominal cavity, the   aforementioned findings were noted.  The bowel was eviscerated and abscesses   were drained.  Cultures were sent.  The appendix was identified and appeared   to be normal.  The sigmoid colon was markedly inflamed and seemed to be the   source of this.  The bowel was transected proximally and distally to the   inflamed  segment of the sigmoid colon and then the mesentery was taken down   sequentially with LigaSure device.  This was sent to pathology for evaluation.    Because of the extensive infection, it was elected to do an end colostomy.    A site was selected in the left upper quadrant.  The bowel was mobilized   adequately to allow for an end ostomy.  Once that was completed, the abdomen   was copiously irrigated again.  Seprafilm was placed.  Incision was closed   with #1 Vicryl for the fascia.  Skin was closed with staples.  The ostomy was   then matured with 3-0 Vicryls.  Dry dressing placed on the wound.  An ostomy   appliance was placed over the ostomy.  The patient was extubated and taken to   the recovery room in stable condition.  All sponge and needle counts correct.       ____________________________________     ELOISE THOMPSON MD    [facility]    DD:  02/18/2019 16:52:36  DT:  02/18/2019 17:07:32    D#:  [id number]  Job#:  [id number]    cc: KAI LIZAMA MD

## 2019-02-19 NOTE — CARE PLAN
Problem: Safety  Goal: Will remain free from injury  Outcome: PROGRESSING AS EXPECTED  Fall precautions in place, patient educated to call before getting out of bed.      Problem: Pain Management  Goal: Pain level will decrease to patient's comfort goal  Outcome: PROGRESSING AS EXPECTED  Pain is well controlled with medication per MAR

## 2019-02-19 NOTE — PROGRESS NOTES
Two RN skin check:    Bony prominences are in intact  New ostomy LUQ   Stoma is red and intact  Midline incision with staples   Dressing is CDI  Patient has potter   Stat lock, bag is dated, below bladder

## 2019-02-19 NOTE — PROGRESS NOTES
Attempted to update sister Jeremy via her cell phone. No answer, left a voice message to include PACU call back number.

## 2019-02-19 NOTE — PROGRESS NOTES
Updated sister #2 Cris via telephone. She will update Jeremy and have her keep cell phone handy. PACU RN will alert them when room is assigned.

## 2019-02-19 NOTE — PROGRESS NOTES
Updated daughter Raquel via her cell phone with room number, etc.   She will update all other family members and meet patient in his room.

## 2019-02-19 NOTE — PROGRESS NOTES
Bedside Report received   Assumed care of patient at 0700.    Pt is A&O x 4.  Pain reported at 8/10. Medicated per MAR  Nausea denied  NPO at this time  Surgical incision to midline abd. Dressing CDI. No drainage noted.    Ostomy in place to RLQ. Collection device is CDI. Stoma is pink and non-tender. Scant serosanguinous output noted.  + Urine output via potter catheter  - BM    - Flatus  Up standby with steady giat  SCD's in place and on  Family at bedside  Bed in lowest position and locked.  Bed alarm NA per Cnady Berg   Pt resting comfortably now.  Review plan of care with patient  Call light within reach  Hourly rounds in place  All needs met at this time

## 2019-02-19 NOTE — CARE PLAN
Problem: Urinary Elimination:  Goal: Ability to reestablish a normal urinary elimination pattern will improve  Outcome: PROGRESSING AS EXPECTED  Orders to D/C potter. Pt declined earlier d/t visitors.  To D/C and monitor for void within 6 hours.    Problem: Pain Management  Goal: Pain level will decrease to patient’s comfort goal  Outcome: PROGRESSING AS EXPECTED  Pt has pain in abdomen, receiving PRN pain meds per MAR, repositioned for comfort. Non-pharmacologic options offered.

## 2019-02-19 NOTE — PROGRESS NOTES
Surgical Progress Note:    POD #1 S/P Exploratory celiotomy, sigmoid colectomy with end colostomy and washout of multiple abdominal abscesses from perforated diverticulitis    Doing well. Neg N/V, no FLATUS/ no BM, Pain controlled, Denies chest pain or SOB.  Ambulating. Tolerating PO.    PE:  /65   Pulse 78   Temp 36.5 °C (97.7 °F) (Temporal)   Resp 16   Ht 1.829 m (6')   Wt 73.3 kg (161 lb 9.6 oz)   SpO2 95%   BMI 21.92 kg/m²     I/O:   Intake/Output Summary (Last 24 hours) at 02/19/19 0937  Last data filed at 02/19/19 0800   Gross per 24 hour   Intake          2376.67 ml   Output              410 ml   Net          1966.67 ml         Review of Systems   Constitutional: Negative.  Negative for chills and fever.   Respiratory: Negative for shortness of breath.    Cardiovascular: Negative for chest pain.   Gastrointestinal: Negative for nausea and vomiting.        noflatus/no stool   Genitourinary: Negative.      Physical Exam   Constitutional:  appears well-developed.   Neck: Neck supple.   Cardiovascular: Normal rate.    Pulmonary/Chest: Effort normal.   Abdominal: Soft.  exhibits no distension. Appropriate tenderness.   Incisions clean, dry, and intact   Stoma pink and viable  Musculoskeletal: Normal range of motion.   Neurological:  alert.   Skin:  Warm and dry.   Extremities: najera, no edema    Labs:  Recent Labs      02/18/19   1250  02/19/19   0313   WBC  23.6*  19.4*   RBC  4.66*  3.86*   HEMOGLOBIN  13.3*  11.2*   HEMATOCRIT  40.5*  34.4*   MCV  86.9  89.1   MCH  28.5  29.0   RDW  49.9  52.1*   PLATELETCT  482*  367   MPV  10.0  10.3   NEUTSPOLYS  88.80*   --    LYMPHOCYTES  5.10*   --    MONOCYTES  4.20   --    EOSINOPHILS  0.00   --    BASOPHILS  0.60   --      Recent Labs      02/18/19   1250  02/19/19   0313   SODIUM  137  136   POTASSIUM  3.2*  3.7   CHLORIDE  101  103   CO2  23  23   GLUCOSE  113*  140*   BUN  18  19         A/P:     - NPO  - IS Q One hour while awake  - Ambulate.  Out of bed  for all meals please  - Pain controlled.  Cont PO pain medication  - Labs noted, recheck in am  - Zosyn, cont due to perforated diverticulitis, await cultures and sensitivities  - DVT propholaxis, ok to start Lovenox, sequentials and ambulate  - Borderline urine output.  Cont, to monitor  - Will D/C potter  - IF continues to do well and tolerates PO, will plan for D/C with return of GI function    No new Assessment & Plan notes have been filed under this hospital service since the last note was generated.  Service: Surgery General      Discussed with Patient, RN and Dr. Candice Lee, A.PGEREMIAS  [facility]  [phone]

## 2019-02-19 NOTE — PROGRESS NOTES
Flor catheter removed per MD order. Pt educated on procedure and 10 mL drained from balloon prior to removal. Mild discomfort noted with removal. Balloon was intact. Pt educated on need to void in 6 hours (2045). Pt verbalizes understanding of all teaching.

## 2019-02-20 PROBLEM — K57.20 DIVERTICULITIS OF COLON WITH PERFORATION: Status: ACTIVE | Noted: 2019-02-20

## 2019-02-20 LAB
BACTERIA WND AEROBE CULT: ABNORMAL
GRAM STN SPEC: ABNORMAL
SIGNIFICANT IND 70042: ABNORMAL
SITE SITE: ABNORMAL
SOURCE SOURCE: ABNORMAL

## 2019-02-20 PROCEDURE — 700102 HCHG RX REV CODE 250 W/ 637 OVERRIDE(OP): Performed by: SURGERY

## 2019-02-20 PROCEDURE — 700105 HCHG RX REV CODE 258: Performed by: SURGERY

## 2019-02-20 PROCEDURE — A9270 NON-COVERED ITEM OR SERVICE: HCPCS | Performed by: SURGERY

## 2019-02-20 PROCEDURE — 700105 HCHG RX REV CODE 258: Performed by: EMERGENCY MEDICINE

## 2019-02-20 PROCEDURE — A9270 NON-COVERED ITEM OR SERVICE: HCPCS | Performed by: NURSE PRACTITIONER

## 2019-02-20 PROCEDURE — 770006 HCHG ROOM/CARE - MED/SURG/GYN SEMI*

## 2019-02-20 PROCEDURE — 51798 US URINE CAPACITY MEASURE: CPT

## 2019-02-20 PROCEDURE — 700102 HCHG RX REV CODE 250 W/ 637 OVERRIDE(OP): Performed by: NURSE PRACTITIONER

## 2019-02-20 PROCEDURE — 700111 HCHG RX REV CODE 636 W/ 250 OVERRIDE (IP): Performed by: SURGERY

## 2019-02-20 RX ORDER — ACETAMINOPHEN 325 MG/1
650 TABLET ORAL EVERY 4 HOURS PRN
Status: DISCONTINUED | OUTPATIENT
Start: 2019-02-20 | End: 2019-02-26 | Stop reason: HOSPADM

## 2019-02-20 RX ORDER — OXYCODONE HYDROCHLORIDE 5 MG/1
5 TABLET ORAL EVERY 4 HOURS PRN
Status: DISCONTINUED | OUTPATIENT
Start: 2019-02-20 | End: 2019-02-26 | Stop reason: HOSPADM

## 2019-02-20 RX ORDER — TAMSULOSIN HYDROCHLORIDE 0.4 MG/1
0.4 CAPSULE ORAL
Status: DISCONTINUED | OUTPATIENT
Start: 2019-02-20 | End: 2019-02-26 | Stop reason: HOSPADM

## 2019-02-20 RX ADMIN — KETOROLAC TROMETHAMINE 30 MG: 30 INJECTION, SOLUTION INTRAMUSCULAR; INTRAVENOUS at 04:45

## 2019-02-20 RX ADMIN — KETOROLAC TROMETHAMINE 30 MG: 30 INJECTION, SOLUTION INTRAMUSCULAR; INTRAVENOUS at 15:53

## 2019-02-20 RX ADMIN — MORPHINE SULFATE 4 MG: 4 INJECTION INTRAVENOUS at 18:54

## 2019-02-20 RX ADMIN — SODIUM CHLORIDE: 900 INJECTION INTRAVENOUS at 16:02

## 2019-02-20 RX ADMIN — KETOROLAC TROMETHAMINE 30 MG: 30 INJECTION, SOLUTION INTRAMUSCULAR; INTRAVENOUS at 21:03

## 2019-02-20 RX ADMIN — PIPERACILLIN SODIUM AND TAZOBACTAM SODIUM 3.38 G: 3; .375 INJECTION, POWDER, FOR SOLUTION INTRAVENOUS at 21:02

## 2019-02-20 RX ADMIN — ENOXAPARIN SODIUM 40 MG: 100 INJECTION SUBCUTANEOUS at 04:45

## 2019-02-20 RX ADMIN — OXYCODONE HYDROCHLORIDE 5 MG: 5 TABLET ORAL at 12:20

## 2019-02-20 RX ADMIN — PIPERACILLIN SODIUM AND TAZOBACTAM SODIUM 3.38 G: 3; .375 INJECTION, POWDER, FOR SOLUTION INTRAVENOUS at 04:45

## 2019-02-20 RX ADMIN — SODIUM CHLORIDE: 900 INJECTION INTRAVENOUS at 04:57

## 2019-02-20 RX ADMIN — ACETAMINOPHEN 650 MG: 325 TABLET, FILM COATED ORAL at 16:51

## 2019-02-20 RX ADMIN — OXYCODONE HYDROCHLORIDE 5 MG: 5 TABLET ORAL at 21:23

## 2019-02-20 RX ADMIN — OXYCODONE HYDROCHLORIDE 5 MG: 5 TABLET ORAL at 17:05

## 2019-02-20 RX ADMIN — MORPHINE SULFATE 4 MG: 4 INJECTION INTRAVENOUS at 15:57

## 2019-02-20 RX ADMIN — TAMSULOSIN HYDROCHLORIDE 0.4 MG: 0.4 CAPSULE ORAL at 12:20

## 2019-02-20 RX ADMIN — MORPHINE SULFATE 4 MG: 4 INJECTION INTRAVENOUS at 02:12

## 2019-02-20 RX ADMIN — PIPERACILLIN SODIUM AND TAZOBACTAM SODIUM 3.38 G: 3; .375 INJECTION, POWDER, FOR SOLUTION INTRAVENOUS at 12:20

## 2019-02-20 RX ADMIN — ONDANSETRON 4 MG: 2 INJECTION INTRAMUSCULAR; INTRAVENOUS at 21:08

## 2019-02-20 RX ADMIN — MORPHINE SULFATE 4 MG: 4 INJECTION INTRAVENOUS at 08:59

## 2019-02-20 RX ADMIN — KETOROLAC TROMETHAMINE 30 MG: 30 INJECTION, SOLUTION INTRAMUSCULAR; INTRAVENOUS at 08:59

## 2019-02-20 ASSESSMENT — ENCOUNTER SYMPTOMS: ABDOMINAL PAIN: 1

## 2019-02-20 NOTE — PROGRESS NOTES
Report received, poc discussed, assumed care of pt.   Call light in reach, hourly rounding in place.   Pt gets up SBA.   NPO with sips diet.  - void, straight cath this AM. LBM pta, + flatus from ostomy but - output.   Morphine for pain.  No further needs.

## 2019-02-20 NOTE — PROGRESS NOTES
Pt A&O x 4.     Vitals: /67   Pulse 79   Temp 37.1 °C (98.8 °F) (Temporal)   Resp 16   Ht 1.829 m (6')   Wt 73.3 kg (161 lb 9.6 oz)   SpO2 98%   BMI 21.92 kg/m²      Pt rates pain 8 out of 10. Medicated per MAR.      Neuro: LYNN. Denies new onset of numbness/ tingling.     Cardiac: Denies new onset of chest pain.     Vascular: Pulses 2+ BUE, BLE. No edema noted.     Respiratory: Lungs sound clear to auscultation. Pulling 1000 on IS, ineffective, needs coaching, strong effort. On 1L o f O2 via nasal cannula.  on, satting in 90's. Denies SOB.     GI: Abdomen soft, tender. LLQ colostomy. Hypoactive bowel sounds, + flatus, - BM. - nausea/ vomiting. Patient is currently NPO.     : Flor removed at 1400. Patient voided small amount of gisell/hazy urine. Bladder scan revealed 47 ml bladder.      MSK: Pt up to bathroom with one assist, tolerating well.     Integumentary: Midline abd incision, dressing in place, CDI. LLQ colostomy, stoma is red/pink and moist, + flatus, scant sanguinous output.     Labs noted.     Fall precautions in place: Bed locked in lowest position, Upper bed rails up, treaded socks in place, personal belongings within reach, call light within reach, appropriate mobility signs in place, - bed alarm. Pt calls appropriately.      Pt updated on POC.

## 2019-02-20 NOTE — ASSESSMENT & PLAN NOTE
Perforated diverticulitis, with multiple abdominal abscesses  2/18 - Colectomy/colostomy  2/23 - Ongoing leukocytosis - Abd CT, no abscess

## 2019-02-20 NOTE — PROGRESS NOTES
Trauma / Surgical Daily Progress Note    Date of Service  2/20/2019    Chief Complaint  58 y.o. male admitted 2/18/2019 with Intra-abdominal abscess (HCC)    Interval Events  Doing better. Ostomy functioning. Start diet. Mobilize. PO meds.    Review of Systems  Review of Systems   Gastrointestinal: Positive for abdominal pain.        Vital Signs  Temp:  [36.6 °C (97.8 °F)-37.2 °C (98.9 °F)] 36.6 °C (97.8 °F)  Pulse:  [70-87] 77  Resp:  [16-18] 18  BP: (103-122)/(63-77) 122/77  SpO2:  [94 %-98 %] 98 %    Physical Exam  Physical Exam   Constitutional: He appears well-developed.   HENT:   Head: Normocephalic.   Neck: Neck supple.   Cardiovascular: Normal rate.    Pulmonary/Chest: Effort normal.   Abdominal: Soft. There is tenderness.   Incision dry  Ostomy pink, functioning.   Musculoskeletal: Normal range of motion.   Neurological: He is alert.   Skin: Skin is warm.       Laboratory  No results found for this or any previous visit (from the past 24 hour(s)).    Fluids    Intake/Output Summary (Last 24 hours) at 02/20/19 1031  Last data filed at 02/20/19 0330   Gross per 24 hour   Intake          2644.16 ml   Output              550 ml   Net          2094.16 ml       Core Measures & Quality Metrics  Labs reviewed and Medications reviewed  Flor catheter: No Flor      DVT Prophylaxis: Enoxaparin (Lovenox)  DVT prophylaxis - mechanical: SCDs  Ulcer prophylaxis: Yes  Antibiotics: Treating active infection/contamination beyond 24 hours perioperative coverage  Assessed for rehab: Patient was assess for and/or received rehabilitation services during this hospitalization    YADIEL Score  ETOH Screening    Assessment/Plan  Diverticulitis of colon with perforation- (present on admission)   Assessment & Plan    Perforated diverticulitis, with multiple abdominal abscesses  2/18 - Colectomy/colostomy         Discussed patient condition with Family, RN and Patient.  CRITICAL CARE TIME EXCLUDING PROCEDURES:20   minutes

## 2019-02-20 NOTE — PROGRESS NOTES
Pt refusing bed alarm despite education from this RN on the risks of falling. Educated pt to call prior to getting up. Pt verbalizes understanding. Pt not attempting to get out of bed and calls appropriately.

## 2019-02-20 NOTE — CARE PLAN
Problem: Communication  Goal: The ability to communicate needs accurately and effectively will improve  Outcome: PROGRESSING AS EXPECTED  Patient updated on POC, all questions answered at this time.    Problem: Safety  Goal: Will remain free from injury  Outcome: PROGRESSING AS EXPECTED  Patient qualifies as a moderate fall risk per Candy Berg assessment. Refusing bed alarm. Bed in locked and lowest position, call light within reach.

## 2019-02-20 NOTE — PROGRESS NOTES
Pt unable to void adequate amount in given time peroid. Voided total of 75 ml throughout shift.    Bladder scan revealed 500 ml of urine.     Explained to patient the need to straight cath per doctors orders and patient's safety. Procedure explained to patient, patient verbalized understanding.    Total of 500 ml of dark gisell urine output with straight cath. Patient tolerated procedure without any complaints.

## 2019-02-20 NOTE — WOUND TEAM
"[facility]  Inpatient Services  New Ostomy Management & Teaching    HPI:  Reviewed  PMH: Reviewed   SH: Reviewed    Subjective: \"I'm too tired. You guys just keep tag teaming me.\"    Objective:appliance intact, no flatus or stool     Ostomy type:end colostomy   Stoma location:LLQ   Stoma assessment:    Appearance: red,dusky, viewed through pouch    Size: ~1.25    Protrusion:<1\"    Output:smear mucus, ss    MC jxn TORRIE    Peristomal skin:TORRIE     Ostomy Appliance (type and size):two piece 2.25\"    Interventions: assessed stoma through pouch, educated pt on surgery performed. Discussed appliance, encouraged pt to read \"Understanding your colostomy\" booklet.       Pt education: Questions and concerns addressed, see above    Evaluation:new colostomy, not 24H old yet. Pt too tired to learn change @ this time. Will see pt tomorrow.    Plan: Ostomy nurses to continue to follow for ostomy needs and teaching     Anticipated discharge needs: Supplies, supplier information, possible HH or outpatient ostomy clinic   "

## 2019-02-20 NOTE — CARE PLAN
Problem: Bowel/Gastric:  Goal: Normal bowel function is maintained or improved  Outcome: PROGRESSING AS EXPECTED  Ostomy with +flatus. Scant sanguinous output, no stool so far. Ostomy RN to come today.    Problem: Mobility  Goal: Risk for activity intolerance will decrease  Outcome: PROGRESSING AS EXPECTED  Up with SBA. Ambulating unit. Needs encouragement.

## 2019-02-21 LAB
ANION GAP SERPL CALC-SCNC: 6 MMOL/L (ref 0–11.9)
BACTERIA SPEC ANAEROBE CULT: ABNORMAL
BACTERIA SPEC ANAEROBE CULT: ABNORMAL
BUN SERPL-MCNC: 13 MG/DL (ref 8–22)
CALCIUM SERPL-MCNC: 9 MG/DL (ref 8.5–10.5)
CHLORIDE SERPL-SCNC: 105 MMOL/L (ref 96–112)
CO2 SERPL-SCNC: 23 MMOL/L (ref 20–33)
CREAT SERPL-MCNC: 0.8 MG/DL (ref 0.5–1.4)
ERYTHROCYTE [DISTWIDTH] IN BLOOD BY AUTOMATED COUNT: 51.2 FL (ref 35.9–50)
GLUCOSE SERPL-MCNC: 126 MG/DL (ref 65–99)
HCT VFR BLD AUTO: 29.3 % (ref 42–52)
HGB BLD-MCNC: 9.5 G/DL (ref 14–18)
MCH RBC QN AUTO: 28.9 PG (ref 27–33)
MCHC RBC AUTO-ENTMCNC: 32.4 G/DL (ref 33.7–35.3)
MCV RBC AUTO: 89.1 FL (ref 81.4–97.8)
PLATELET # BLD AUTO: 343 K/UL (ref 164–446)
PMV BLD AUTO: 10.2 FL (ref 9–12.9)
POTASSIUM SERPL-SCNC: 3.3 MMOL/L (ref 3.6–5.5)
RBC # BLD AUTO: 3.29 M/UL (ref 4.7–6.1)
SIGNIFICANT IND 70042: ABNORMAL
SITE SITE: ABNORMAL
SODIUM SERPL-SCNC: 134 MMOL/L (ref 135–145)
SOURCE SOURCE: ABNORMAL
WBC # BLD AUTO: 18.9 K/UL (ref 4.8–10.8)

## 2019-02-21 PROCEDURE — 770006 HCHG ROOM/CARE - MED/SURG/GYN SEMI*

## 2019-02-21 PROCEDURE — 700105 HCHG RX REV CODE 258: Performed by: SURGERY

## 2019-02-21 PROCEDURE — 36415 COLL VENOUS BLD VENIPUNCTURE: CPT

## 2019-02-21 PROCEDURE — 700102 HCHG RX REV CODE 250 W/ 637 OVERRIDE(OP): Performed by: SURGERY

## 2019-02-21 PROCEDURE — 85027 COMPLETE CBC AUTOMATED: CPT

## 2019-02-21 PROCEDURE — A9270 NON-COVERED ITEM OR SERVICE: HCPCS | Performed by: NURSE PRACTITIONER

## 2019-02-21 PROCEDURE — 80048 BASIC METABOLIC PNL TOTAL CA: CPT

## 2019-02-21 PROCEDURE — 700111 HCHG RX REV CODE 636 W/ 250 OVERRIDE (IP): Performed by: NURSE PRACTITIONER

## 2019-02-21 PROCEDURE — 700111 HCHG RX REV CODE 636 W/ 250 OVERRIDE (IP): Performed by: SURGERY

## 2019-02-21 PROCEDURE — 700102 HCHG RX REV CODE 250 W/ 637 OVERRIDE(OP): Performed by: NURSE PRACTITIONER

## 2019-02-21 PROCEDURE — A9270 NON-COVERED ITEM OR SERVICE: HCPCS | Performed by: SURGERY

## 2019-02-21 PROCEDURE — 700105 HCHG RX REV CODE 258: Performed by: EMERGENCY MEDICINE

## 2019-02-21 RX ORDER — METOCLOPRAMIDE HYDROCHLORIDE 5 MG/ML
10 INJECTION INTRAMUSCULAR; INTRAVENOUS EVERY 6 HOURS
Status: DISCONTINUED | OUTPATIENT
Start: 2019-02-21 | End: 2019-02-24

## 2019-02-21 RX ADMIN — OXYCODONE HYDROCHLORIDE 5 MG: 5 TABLET ORAL at 11:07

## 2019-02-21 RX ADMIN — PIPERACILLIN SODIUM AND TAZOBACTAM SODIUM 3.38 G: 3; .375 INJECTION, POWDER, FOR SOLUTION INTRAVENOUS at 04:58

## 2019-02-21 RX ADMIN — OXYCODONE HYDROCHLORIDE 5 MG: 5 TABLET ORAL at 15:16

## 2019-02-21 RX ADMIN — ONDANSETRON 4 MG: 2 INJECTION INTRAMUSCULAR; INTRAVENOUS at 20:24

## 2019-02-21 RX ADMIN — PIPERACILLIN SODIUM AND TAZOBACTAM SODIUM 3.38 G: 3; .375 INJECTION, POWDER, FOR SOLUTION INTRAVENOUS at 13:40

## 2019-02-21 RX ADMIN — KETOROLAC TROMETHAMINE 30 MG: 30 INJECTION, SOLUTION INTRAMUSCULAR; INTRAVENOUS at 08:04

## 2019-02-21 RX ADMIN — ENOXAPARIN SODIUM 40 MG: 100 INJECTION SUBCUTANEOUS at 05:01

## 2019-02-21 RX ADMIN — METOCLOPRAMIDE 10 MG: 5 INJECTION, SOLUTION INTRAMUSCULAR; INTRAVENOUS at 17:12

## 2019-02-21 RX ADMIN — TAMSULOSIN HYDROCHLORIDE 0.4 MG: 0.4 CAPSULE ORAL at 08:04

## 2019-02-21 RX ADMIN — SODIUM CHLORIDE: 900 INJECTION INTRAVENOUS at 10:26

## 2019-02-21 RX ADMIN — SODIUM CHLORIDE: 900 INJECTION INTRAVENOUS at 02:33

## 2019-02-21 RX ADMIN — ONDANSETRON 4 MG: 2 INJECTION INTRAMUSCULAR; INTRAVENOUS at 06:56

## 2019-02-21 RX ADMIN — OXYCODONE HYDROCHLORIDE 5 MG: 5 TABLET ORAL at 06:56

## 2019-02-21 RX ADMIN — PIPERACILLIN SODIUM AND TAZOBACTAM SODIUM 3.38 G: 3; .375 INJECTION, POWDER, FOR SOLUTION INTRAVENOUS at 20:26

## 2019-02-21 RX ADMIN — OXYCODONE HYDROCHLORIDE 5 MG: 5 TABLET ORAL at 20:26

## 2019-02-21 RX ADMIN — METOCLOPRAMIDE 10 MG: 5 INJECTION, SOLUTION INTRAMUSCULAR; INTRAVENOUS at 08:04

## 2019-02-21 RX ADMIN — DEXAMETHASONE SODIUM PHOSPHATE 4 MG: 4 INJECTION, SOLUTION INTRAMUSCULAR; INTRAVENOUS at 01:26

## 2019-02-21 RX ADMIN — SODIUM CHLORIDE: 900 INJECTION INTRAVENOUS at 17:44

## 2019-02-21 RX ADMIN — METOCLOPRAMIDE 10 MG: 5 INJECTION, SOLUTION INTRAMUSCULAR; INTRAVENOUS at 11:08

## 2019-02-21 RX ADMIN — KETOROLAC TROMETHAMINE 30 MG: 30 INJECTION, SOLUTION INTRAMUSCULAR; INTRAVENOUS at 02:33

## 2019-02-21 NOTE — CARE PLAN
Problem: Communication  Goal: The ability to communicate needs accurately and effectively will improve  Outcome: PROGRESSING AS EXPECTED  Patient and family updated on POC, all questions answered at this time.    Problem: Safety  Goal: Will remain free from injury  Outcome: PROGRESSING AS EXPECTED  Patient calls appropriately for assistance. Bed in locked and lowest position, call light within reach.

## 2019-02-21 NOTE — PROGRESS NOTES
Pt A&O x 4.     Vitals: /68   Pulse 93   Temp 36.8 °C (98.3 °F) (Oral)   Resp 16   Ht 1.829 m (6')   Wt 73.3 kg (161 lb 9.6 oz)   SpO2 92%   BMI 21.92 kg/m²       Pt rates pain 8 out of 10. Medicated per MAR.      Neuro: LYNN. Denies new onset of numbness/ tingling.     Cardiac: Denies new onset of chest pain.     Vascular: Pulses 2+ BUE, BLE. No edema noted.     Respiratory: Lungs sound clear to auscultation. Pulling 1000 on IS, ineffective, needs coaching, strong effort. On 1L o f O2 via nasal cannula.  on, satting in 90's. Denies SOB.     GI: Abdomen soft, tender. LLQ colostomy. Hypoactive bowel sounds, + flatus, - BM output. + nausea, medicated per MAR.     : + voiding.      MSK: Pt up to bathroom with one assist, tolerating well.     Integumentary: Midline abd incision, dressing in place, CDI. LLQ colostomy, stoma is red/pink and moist, + flatus, scant sanguinous output.     Labs noted.     Fall precautions in place: Bed locked in lowest position, Upper bed rails up, treaded socks in place, personal belongings within reach, call light within reach, appropriate mobility signs in place, - bed alarm. Pt calls appropriately.      Pt updated on POC.

## 2019-02-21 NOTE — PROGRESS NOTES
Surgical Progress Note:    POD #3 S/P Exploratory celiotomy, sigmoid colectomy with end colostomy and washout of multiple abdominal abscesses from perforated diverticulitis.  ?alcohol withdrawl per daughter, pt denies.    Doing well. + N/V, no FLATUS/ no BM from ostomy, Pain controlled, Denies chest pain or SOB.  Ambulating.     PE:  /79   Pulse 78   Temp 36.4 °C (97.6 °F) (Oral)   Resp 16   Ht 1.829 m (6')   Wt 73.3 kg (161 lb 9.6 oz)   SpO2 96%   BMI 21.92 kg/m²     I/O:   Intake/Output Summary (Last 24 hours) at 02/19/19 0937  Last data filed at 02/19/19 0800   Gross per 24 hour   Intake          2376.67 ml   Output              410 ml   Net          1966.67 ml         Review of Systems   Constitutional: Negative.  Negative for chills and fever.   Respiratory: Negative for shortness of breath.    Cardiovascular: Negative for chest pain.   Gastrointestinal: Negative for nausea and vomiting.        noflatus/no stool   Genitourinary: Negative.      Physical Exam   Constitutional:  appears well-developed.   Neck: Neck supple.   Cardiovascular: Normal rate.    Pulmonary/Chest: Effort normal.   Abdominal: Soft.  exhibits no distension. Appropriate tenderness.   Incisions clean, dry, and intact   Stoma pink and viable  Musculoskeletal: Normal range of motion.   Neurological:  alert.   Skin:  Warm and dry.   Extremities: najera, no edema    Labs:  Recent Labs      02/18/19   1250  02/19/19   0313  02/21/19   0330   WBC  23.6*  19.4*  18.9*   RBC  4.66*  3.86*  3.29*   HEMOGLOBIN  13.3*  11.2*  9.5*   HEMATOCRIT  40.5*  34.4*  29.3*   MCV  86.9  89.1  89.1   MCH  28.5  29.0  28.9   RDW  49.9  52.1*  51.2*   PLATELETCT  482*  367  343   MPV  10.0  10.3  10.2   NEUTSPOLYS  88.80*   --    --    LYMPHOCYTES  5.10*   --    --    MONOCYTES  4.20   --    --    EOSINOPHILS  0.00   --    --    BASOPHILS  0.60   --    --      Recent Labs      02/18/19   1250  02/19/19   0313  02/21/19   0330   SODIUM  137  136  134*    POTASSIUM  3.2*  3.7  3.3*   CHLORIDE  101  103  105   CO2  23  23  23   GLUCOSE  113*  140*  126*   BUN  18  19  13         A/P:     - NPO, sips with meds  - IS Q One hour while awake  - Ambulate.  Out of bed for all meals please  - Pain controlled.  Cont PO pain medication  - Labs noted, recheck in am  - Zosyn, cont due to perforated diverticulitis, await cultures and sensitivities  - DVT propholaxis, ok to cont Lovenox, sequentials and ambulate  - Borderline urine output.  Cont, to monitor  - Will D/C potter  - cont to monitor for alcohol withdrawl  - IF continues to do well and tolerates PO, will plan for D/C with return of GI function    Diverticulitis of colon with perforation- (present on admission)   Assessment & Plan    Perforated diverticulitis, with multiple abdominal abscesses  2/18 - Colectomy/colostomy         Discussed with Patient, RN and Dr. Candice Lee, A.P.AUDIE.  [facility]  [phone]

## 2019-02-21 NOTE — PROGRESS NOTES
"Patient's daughter Annika called asking about patient's status. This RN informed Annika that the patient was resting in bed, all needs met at this time, and that the other family member, Jeremy, was currently at bedside.    Annika then informed this RN that the patient, Marbin, has been drinking \"a bottle of vodka a day for about 36 years\" and that \"he stopped drinking about 6 days ago when he started to feel sick\".     Patient is currently not exhibiting symptoms of ETOH withdrawal. Denies alcohol consumption when asked.  "

## 2019-02-21 NOTE — PROGRESS NOTES
Patient c/o distention this am Ostomy with small amount of flatus - BM. Denies N/V this am did have some last night. NPO sips for medications. Educated and encouraged ambulation in hallway with staff today. Offered shower patient will let CNA know. Midline INES with staples. MD orders reviewed, all questions answered. /79   Pulse 78   Temp 36.4 °C (97.6 °F) (Oral)   Resp 16   Ht 1.829 m (6')   Wt 73.3 kg (161 lb 9.6 oz)   SpO2 96%   BMI 21.92 kg/m²

## 2019-02-21 NOTE — WOUND TEAM
"[facility]  Inpatient Services  New Ostomy Management & Teaching    HPI:  Reviewed  PMH: Reviewed   SH: Reviewed    Subjective: \"It's starting to work.\"    Objective:appliance intact, + flatus      Ostomy type:end colostomy   Stoma location:LLQ   Stoma assessment:    Appearance: red, moist, round    Size: ~1.38\"    Protrusion:<1\"    Output:smear mucus, ss    MC jxn intact    Peristomal skin:intact     Ostomy Appliance (type and size):two piece 2.25\" with paste ring    Interventions: Pt  read \"Understanding your colostomy\" booklet, asked  Couple of questions. He removed appliance after it was demonstrated. Staff cleaned skin and made patten. He cut barrier. Staff demonstrated how to apply paste ring. Applied barrier, Pt closed end then attached pouch after having it demonstrated.      Pt education: Questions and concerns addressed, see above    Evaluation:new colostomy, flatus present. Pt eating [employer]. Has swabs @ bs. He understands appliance change, needs to perform next time. Discussed [employer], he wants his sister to help him with the privacy statement and to determine if he wants service. Address has been confirmed and is in file if he signs card.     Plan: Ostomy nurses to continue to follow for ostomy needs and teaching     Anticipated discharge needs: Supplies, supplier information, possible HH or outpatient ostomy clinic   "

## 2019-02-21 NOTE — PROGRESS NOTES
"Pt states he feels like he's \"burning up.\"  Recent vitals showed that he was afebrile.  New set of vitals obtained:  Vitals:    02/20/19 1615   BP: 124/82   Pulse: (!) 102   Resp: (!) 22   Temp: (!) 38.5 °C (101.3 °F)   SpO2:      Pt is bundled up in blankets. Attempted to take off some of the blankets, but pt refused stating that he starts shaking if he takes them off.   Page to Floresita GOULD for updates.   "

## 2019-02-22 LAB
ANION GAP SERPL CALC-SCNC: 8 MMOL/L (ref 0–11.9)
BUN SERPL-MCNC: 13 MG/DL (ref 8–22)
CALCIUM SERPL-MCNC: 9 MG/DL (ref 8.5–10.5)
CHLORIDE SERPL-SCNC: 106 MMOL/L (ref 96–112)
CO2 SERPL-SCNC: 22 MMOL/L (ref 20–33)
CREAT SERPL-MCNC: 0.75 MG/DL (ref 0.5–1.4)
ERYTHROCYTE [DISTWIDTH] IN BLOOD BY AUTOMATED COUNT: 52.6 FL (ref 35.9–50)
GLUCOSE SERPL-MCNC: 88 MG/DL (ref 65–99)
HCT VFR BLD AUTO: 27.6 % (ref 42–52)
HGB BLD-MCNC: 8.9 G/DL (ref 14–18)
MCH RBC QN AUTO: 29.1 PG (ref 27–33)
MCHC RBC AUTO-ENTMCNC: 32.2 G/DL (ref 33.7–35.3)
MCV RBC AUTO: 90.2 FL (ref 81.4–97.8)
PLATELET # BLD AUTO: 371 K/UL (ref 164–446)
PMV BLD AUTO: 10.3 FL (ref 9–12.9)
POTASSIUM SERPL-SCNC: 3.3 MMOL/L (ref 3.6–5.5)
RBC # BLD AUTO: 3.06 M/UL (ref 4.7–6.1)
SODIUM SERPL-SCNC: 136 MMOL/L (ref 135–145)
WBC # BLD AUTO: 19.6 K/UL (ref 4.8–10.8)

## 2019-02-22 PROCEDURE — 80048 BASIC METABOLIC PNL TOTAL CA: CPT

## 2019-02-22 PROCEDURE — 85027 COMPLETE CBC AUTOMATED: CPT

## 2019-02-22 PROCEDURE — A9270 NON-COVERED ITEM OR SERVICE: HCPCS | Performed by: SURGERY

## 2019-02-22 PROCEDURE — 700105 HCHG RX REV CODE 258: Performed by: SURGERY

## 2019-02-22 PROCEDURE — 700102 HCHG RX REV CODE 250 W/ 637 OVERRIDE(OP): Performed by: NURSE PRACTITIONER

## 2019-02-22 PROCEDURE — 700102 HCHG RX REV CODE 250 W/ 637 OVERRIDE(OP): Performed by: SURGERY

## 2019-02-22 PROCEDURE — 700111 HCHG RX REV CODE 636 W/ 250 OVERRIDE (IP): Performed by: SURGERY

## 2019-02-22 PROCEDURE — 36415 COLL VENOUS BLD VENIPUNCTURE: CPT

## 2019-02-22 PROCEDURE — A9270 NON-COVERED ITEM OR SERVICE: HCPCS | Performed by: NURSE PRACTITIONER

## 2019-02-22 PROCEDURE — 302111 WAFER OST 2.25IN N IMG RD 2 PC (BARRIER): Performed by: NURSE PRACTITIONER

## 2019-02-22 PROCEDURE — 700105 HCHG RX REV CODE 258: Performed by: EMERGENCY MEDICINE

## 2019-02-22 PROCEDURE — 700111 HCHG RX REV CODE 636 W/ 250 OVERRIDE (IP): Performed by: NURSE PRACTITIONER

## 2019-02-22 PROCEDURE — 770006 HCHG ROOM/CARE - MED/SURG/GYN SEMI*

## 2019-02-22 PROCEDURE — 302098 PASTE RING (FLAT): Performed by: NURSE PRACTITIONER

## 2019-02-22 RX ORDER — ALLOPURINOL 100 MG/1
200 TABLET ORAL DAILY
Status: DISCONTINUED | OUTPATIENT
Start: 2019-02-22 | End: 2019-02-26 | Stop reason: HOSPADM

## 2019-02-22 RX ORDER — INDOMETHACIN 50 MG/1
50 CAPSULE ORAL EVERY 6 HOURS
Status: DISCONTINUED | OUTPATIENT
Start: 2019-02-22 | End: 2019-02-26 | Stop reason: HOSPADM

## 2019-02-22 RX ADMIN — METOCLOPRAMIDE 10 MG: 5 INJECTION, SOLUTION INTRAMUSCULAR; INTRAVENOUS at 17:31

## 2019-02-22 RX ADMIN — SODIUM CHLORIDE: 900 INJECTION INTRAVENOUS at 20:24

## 2019-02-22 RX ADMIN — SODIUM CHLORIDE: 900 INJECTION INTRAVENOUS at 09:04

## 2019-02-22 RX ADMIN — OXYCODONE HYDROCHLORIDE 5 MG: 5 TABLET ORAL at 13:21

## 2019-02-22 RX ADMIN — METOCLOPRAMIDE 10 MG: 5 INJECTION, SOLUTION INTRAMUSCULAR; INTRAVENOUS at 12:32

## 2019-02-22 RX ADMIN — ONDANSETRON 4 MG: 2 INJECTION INTRAMUSCULAR; INTRAVENOUS at 23:18

## 2019-02-22 RX ADMIN — INDOMETHACIN 50 MG: 50 CAPSULE ORAL at 12:03

## 2019-02-22 RX ADMIN — SODIUM CHLORIDE: 900 INJECTION INTRAVENOUS at 00:46

## 2019-02-22 RX ADMIN — ACETAMINOPHEN 650 MG: 325 TABLET, FILM COATED ORAL at 12:45

## 2019-02-22 RX ADMIN — OXYCODONE HYDROCHLORIDE 5 MG: 5 TABLET ORAL at 00:40

## 2019-02-22 RX ADMIN — PIPERACILLIN SODIUM AND TAZOBACTAM SODIUM 3.38 G: 3; .375 INJECTION, POWDER, FOR SOLUTION INTRAVENOUS at 04:45

## 2019-02-22 RX ADMIN — ONDANSETRON 4 MG: 2 INJECTION INTRAMUSCULAR; INTRAVENOUS at 18:45

## 2019-02-22 RX ADMIN — ONDANSETRON 4 MG: 2 INJECTION INTRAMUSCULAR; INTRAVENOUS at 00:39

## 2019-02-22 RX ADMIN — METOCLOPRAMIDE 10 MG: 5 INJECTION, SOLUTION INTRAMUSCULAR; INTRAVENOUS at 00:39

## 2019-02-22 RX ADMIN — TAMSULOSIN HYDROCHLORIDE 0.4 MG: 0.4 CAPSULE ORAL at 08:59

## 2019-02-22 RX ADMIN — OXYCODONE HYDROCHLORIDE 5 MG: 5 TABLET ORAL at 17:33

## 2019-02-22 RX ADMIN — OXYCODONE HYDROCHLORIDE 5 MG: 5 TABLET ORAL at 21:34

## 2019-02-22 RX ADMIN — METOCLOPRAMIDE 10 MG: 5 INJECTION, SOLUTION INTRAMUSCULAR; INTRAVENOUS at 23:18

## 2019-02-22 RX ADMIN — PIPERACILLIN SODIUM AND TAZOBACTAM SODIUM 3.38 G: 3; .375 INJECTION, POWDER, FOR SOLUTION INTRAVENOUS at 12:32

## 2019-02-22 RX ADMIN — METOCLOPRAMIDE 10 MG: 5 INJECTION, SOLUTION INTRAMUSCULAR; INTRAVENOUS at 06:40

## 2019-02-22 RX ADMIN — OXYCODONE HYDROCHLORIDE 5 MG: 5 TABLET ORAL at 08:59

## 2019-02-22 RX ADMIN — ENOXAPARIN SODIUM 40 MG: 100 INJECTION SUBCUTANEOUS at 04:45

## 2019-02-22 RX ADMIN — INDOMETHACIN 50 MG: 50 CAPSULE ORAL at 17:34

## 2019-02-22 RX ADMIN — PIPERACILLIN SODIUM AND TAZOBACTAM SODIUM 3.38 G: 3; .375 INJECTION, POWDER, FOR SOLUTION INTRAVENOUS at 20:24

## 2019-02-22 RX ADMIN — OXYCODONE HYDROCHLORIDE 5 MG: 5 TABLET ORAL at 04:44

## 2019-02-22 RX ADMIN — ALLOPURINOL 200 MG: 100 TABLET ORAL at 12:03

## 2019-02-22 RX ADMIN — INDOMETHACIN 50 MG: 50 CAPSULE ORAL at 23:21

## 2019-02-22 NOTE — PROGRESS NOTES
Pt A&O x 4.     Vitals: /80   Pulse 85   Temp 37.6 °C (99.7 °F) (Temporal)   Resp 16   Ht 1.829 m (6')   Wt 73.3 kg (161 lb 9.6 oz)   SpO2 95%   BMI 21.92 kg/m²        Pt rates pain 8 out of 10. Medicated per MAR.      Neuro: LYNN. Denies new onset of numbness/ tingling.     Cardiac: Denies new onset of chest pain.     Vascular: Pulses 2+ BUE, BLE. No edema noted.     Respiratory: Lungs sound clear/diminished to auscultation. Refusing to use IS at this time. On 1L o f O2 via nasal cannula.  on, satting in 90's. Denies SOB.     GI: Abdomen semi-firm, tender. LLQ colostomy. Hypoactive bowel sounds, + flatus, - BM output. + nausea, medicated per MAR.     : + voiding. Urine is dark gisell in color but clear.     MSK: Pt up to bathroom with one assist, tolerating well. Patient is refusing to ambulate at this time.     Integumentary: Midline abd incision, INES, no drainage noted. LLQ colostomy, stoma is red/pink and moist, + flatus, scant sanguinous output.     Labs noted.     Fall precautions in place: Bed locked in lowest position, Upper bed rails up, treaded socks in place, personal belongings within reach, call light within reach, appropriate mobility signs in place, - bed alarm. Pt calls appropriately.      Pt updated on POC.

## 2019-02-22 NOTE — PROGRESS NOTES
Surgical Progress Note:    POD #4 S/P Exploratory celiotomy, sigmoid colectomy with end colostomy and washout of multiple abdominal abscesses from perforated diverticulitis.      Doing well. - N/V, + FLATUS/ no BM from ostomy, Pain controlled, Denies chest pain or SOB.  Ambulating. C/O gout flare    PE:  /81   Pulse 76   Temp 36.7 °C (98.1 °F) (Temporal)   Resp 16   Ht 1.829 m (6')   Wt 73.3 kg (161 lb 9.6 oz)   SpO2 97%   BMI 21.92 kg/m²     I/O:   Intake/Output Summary (Last 24 hours) at 02/19/19 0937  Last data filed at 02/19/19 0800   Gross per 24 hour   Intake          2376.67 ml   Output              410 ml   Net          1966.67 ml         Review of Systems   Constitutional: Negative.  Negative for chills and fever.   Respiratory: Negative for shortness of breath.    Cardiovascular: Negative for chest pain.   Gastrointestinal: Negative for nausea and vomiting.        +flatus/no stool   Genitourinary: Negative.      Physical Exam   Constitutional:  appears well-developed.   Neck: Neck supple.   Cardiovascular: Normal rate.    Pulmonary/Chest: Effort normal.   Abdominal: Soft.  exhibits no distension. Appropriate tenderness.   Incisions clean, dry, and intact   Stoma pink and viable  Musculoskeletal: Normal range of motion.   Neurological:  alert.   Skin:  Warm and dry.   Extremities: najera, no edema    Labs:  Recent Labs      02/21/19   0330  02/22/19   0409   WBC  18.9*  19.6*   RBC  3.29*  3.06*   HEMOGLOBIN  9.5*  8.9*   HEMATOCRIT  29.3*  27.6*   MCV  89.1  90.2   MCH  28.9  29.1   RDW  51.2*  52.6*   PLATELETCT  343  371   MPV  10.2  10.3     Recent Labs      02/21/19   0330  02/22/19   0409   SODIUM  134*  136   POTASSIUM  3.3*  3.3*   CHLORIDE  105  106   CO2  23  22   GLUCOSE  126*  88   BUN  13  13         A/P:     - NPO, sips with meds  - IS Q One hour while awake  - Ambulate.  Out of bed for all meals please  - Pain controlled.  Cont PO pain medication  - Labs noted, WBC's 19 recheck in  am  - Zosyn, cont due to perforated diverticulitis, Final cultures and sensitivities noted  - DVT propholaxis, ok to cont Lovenox, sequentials and ambulate  - Improved urine output.  Cont, to monitor  - Voiding without difficulty  - restart gout meds  - cont to monitor for alcohol withdrawl  - cont to monitor for abscess, possible CT in a day or so if WBC's dont normalize or pt febrile  - IF continues to do well and tolerates PO, will plan for D/C with return of GI function    Diverticulitis of colon with perforation- (present on admission)   Assessment & Plan    Perforated diverticulitis, with multiple abdominal abscesses  2/18 - Colectomy/colostomy         Discussed with Patient, RN and Dr. Candice Lee, A.P.AUDIE.  [facility]  [phone]

## 2019-02-22 NOTE — CARE PLAN
Problem: Communication  Goal: The ability to communicate needs accurately and effectively will improve  Outcome: PROGRESSING AS EXPECTED  Patient updated on POC, all questions answered at this time.    Problem: Safety  Goal: Will remain free from injury  Outcome: PROGRESSING AS EXPECTED  Patient calls appropriately for assistance, bed in locked and lowest position, call light within reach.    Problem: Infection  Goal: Will remain free from infection  Outcome: PROGRESSING AS EXPECTED  IV abx running per MAR.

## 2019-02-22 NOTE — PROGRESS NOTES
A&Ox 4.   Family at bedside.   VSS.   Pt states abdominal pain and L knee pain d/t gout. Medicated PO per MAR.   Midline incision w/ staples INES. CDI  LLQ Ostomy red, scant yellow/serosanguinous output present in ostomy bag.   NPO sips w/ meds, denies n/v, hyperactive bowel sounds, +  flatus, LBM PTA.   Saturating >90% on RA.   Pt ambulates x1 assist, FWW.  Pt refused bed alarm.    Request shower today once pain controlled.   Mild dependent edema in bilateral lower extremities.   Updated on plan of care. Safety education provided. Bed locked in low. Call light within reach. Rounding in place.

## 2019-02-23 ENCOUNTER — APPOINTMENT (OUTPATIENT)
Dept: RADIOLOGY | Facility: MEDICAL CENTER | Age: 59
DRG: 329 | End: 2019-02-23
Attending: SURGERY
Payer: MEDICAID

## 2019-02-23 LAB
ANION GAP SERPL CALC-SCNC: 8 MMOL/L (ref 0–11.9)
BUN SERPL-MCNC: 12 MG/DL (ref 8–22)
CALCIUM SERPL-MCNC: 8.3 MG/DL (ref 8.5–10.5)
CHLORIDE SERPL-SCNC: 107 MMOL/L (ref 96–112)
CO2 SERPL-SCNC: 23 MMOL/L (ref 20–33)
CREAT SERPL-MCNC: 0.68 MG/DL (ref 0.5–1.4)
ERYTHROCYTE [DISTWIDTH] IN BLOOD BY AUTOMATED COUNT: 52.3 FL (ref 35.9–50)
GLUCOSE SERPL-MCNC: 104 MG/DL (ref 65–99)
HCT VFR BLD AUTO: 27.2 % (ref 42–52)
HGB BLD-MCNC: 8.8 G/DL (ref 14–18)
MCH RBC QN AUTO: 29 PG (ref 27–33)
MCHC RBC AUTO-ENTMCNC: 32.4 G/DL (ref 33.7–35.3)
MCV RBC AUTO: 89.8 FL (ref 81.4–97.8)
PLATELET # BLD AUTO: 349 K/UL (ref 164–446)
PMV BLD AUTO: 9.7 FL (ref 9–12.9)
POTASSIUM SERPL-SCNC: 3.2 MMOL/L (ref 3.6–5.5)
RBC # BLD AUTO: 3.03 M/UL (ref 4.7–6.1)
SODIUM SERPL-SCNC: 138 MMOL/L (ref 135–145)
WBC # BLD AUTO: 20.5 K/UL (ref 4.8–10.8)

## 2019-02-23 PROCEDURE — 770006 HCHG ROOM/CARE - MED/SURG/GYN SEMI*

## 2019-02-23 PROCEDURE — 700117 HCHG RX CONTRAST REV CODE 255: Performed by: SURGERY

## 2019-02-23 PROCEDURE — 700102 HCHG RX REV CODE 250 W/ 637 OVERRIDE(OP): Performed by: SURGERY

## 2019-02-23 PROCEDURE — A9270 NON-COVERED ITEM OR SERVICE: HCPCS | Performed by: NURSE PRACTITIONER

## 2019-02-23 PROCEDURE — 74177 CT ABD & PELVIS W/CONTRAST: CPT

## 2019-02-23 PROCEDURE — 700102 HCHG RX REV CODE 250 W/ 637 OVERRIDE(OP): Performed by: NURSE PRACTITIONER

## 2019-02-23 PROCEDURE — 700105 HCHG RX REV CODE 258: Performed by: SURGERY

## 2019-02-23 PROCEDURE — 700111 HCHG RX REV CODE 636 W/ 250 OVERRIDE (IP): Performed by: NURSE PRACTITIONER

## 2019-02-23 PROCEDURE — A9270 NON-COVERED ITEM OR SERVICE: HCPCS | Performed by: SURGERY

## 2019-02-23 PROCEDURE — 85027 COMPLETE CBC AUTOMATED: CPT

## 2019-02-23 PROCEDURE — 36415 COLL VENOUS BLD VENIPUNCTURE: CPT

## 2019-02-23 PROCEDURE — 700111 HCHG RX REV CODE 636 W/ 250 OVERRIDE (IP): Performed by: SURGERY

## 2019-02-23 PROCEDURE — 80048 BASIC METABOLIC PNL TOTAL CA: CPT

## 2019-02-23 PROCEDURE — 700105 HCHG RX REV CODE 258: Performed by: EMERGENCY MEDICINE

## 2019-02-23 RX ORDER — SODIUM CHLORIDE 9 MG/ML
INJECTION, SOLUTION INTRAVENOUS
Status: COMPLETED
Start: 2019-02-23 | End: 2019-02-24

## 2019-02-23 RX ADMIN — METOCLOPRAMIDE 10 MG: 5 INJECTION, SOLUTION INTRAMUSCULAR; INTRAVENOUS at 18:36

## 2019-02-23 RX ADMIN — INDOMETHACIN 50 MG: 50 CAPSULE ORAL at 05:10

## 2019-02-23 RX ADMIN — INDOMETHACIN 50 MG: 50 CAPSULE ORAL at 18:36

## 2019-02-23 RX ADMIN — INDOMETHACIN 50 MG: 50 CAPSULE ORAL at 12:20

## 2019-02-23 RX ADMIN — ONDANSETRON 4 MG: 2 INJECTION INTRAMUSCULAR; INTRAVENOUS at 16:33

## 2019-02-23 RX ADMIN — PIPERACILLIN SODIUM AND TAZOBACTAM SODIUM 3.38 G: 3; .375 INJECTION, POWDER, FOR SOLUTION INTRAVENOUS at 20:56

## 2019-02-23 RX ADMIN — ONDANSETRON 4 MG: 2 INJECTION INTRAMUSCULAR; INTRAVENOUS at 05:10

## 2019-02-23 RX ADMIN — METOCLOPRAMIDE 10 MG: 5 INJECTION, SOLUTION INTRAMUSCULAR; INTRAVENOUS at 05:10

## 2019-02-23 RX ADMIN — OXYCODONE HYDROCHLORIDE 5 MG: 5 TABLET ORAL at 20:56

## 2019-02-23 RX ADMIN — OXYCODONE HYDROCHLORIDE 5 MG: 5 TABLET ORAL at 05:31

## 2019-02-23 RX ADMIN — OXYCODONE HYDROCHLORIDE 5 MG: 5 TABLET ORAL at 09:49

## 2019-02-23 RX ADMIN — IOHEXOL 100 ML: 350 INJECTION, SOLUTION INTRAVENOUS at 19:00

## 2019-02-23 RX ADMIN — TAMSULOSIN HYDROCHLORIDE 0.4 MG: 0.4 CAPSULE ORAL at 09:49

## 2019-02-23 RX ADMIN — OXYCODONE HYDROCHLORIDE 5 MG: 5 TABLET ORAL at 01:34

## 2019-02-23 RX ADMIN — MEROPENEM 1 G: 1 INJECTION, POWDER, FOR SOLUTION INTRAVENOUS at 22:48

## 2019-02-23 RX ADMIN — SODIUM CHLORIDE: 900 INJECTION INTRAVENOUS at 05:31

## 2019-02-23 RX ADMIN — ONDANSETRON 4 MG: 2 INJECTION INTRAMUSCULAR; INTRAVENOUS at 09:49

## 2019-02-23 RX ADMIN — PIPERACILLIN SODIUM AND TAZOBACTAM SODIUM 3.38 G: 3; .375 INJECTION, POWDER, FOR SOLUTION INTRAVENOUS at 05:31

## 2019-02-23 RX ADMIN — METOCLOPRAMIDE 10 MG: 5 INJECTION, SOLUTION INTRAMUSCULAR; INTRAVENOUS at 12:15

## 2019-02-23 RX ADMIN — POTASSIUM CHLORIDE: 2 INJECTION, SOLUTION, CONCENTRATE INTRAVENOUS at 12:15

## 2019-02-23 RX ADMIN — ENOXAPARIN SODIUM 40 MG: 100 INJECTION SUBCUTANEOUS at 05:10

## 2019-02-23 RX ADMIN — PIPERACILLIN SODIUM AND TAZOBACTAM SODIUM 3.38 G: 3; .375 INJECTION, POWDER, FOR SOLUTION INTRAVENOUS at 12:15

## 2019-02-23 RX ADMIN — OXYCODONE HYDROCHLORIDE 5 MG: 5 TABLET ORAL at 16:33

## 2019-02-23 RX ADMIN — ALLOPURINOL 200 MG: 100 TABLET ORAL at 05:10

## 2019-02-23 ASSESSMENT — ENCOUNTER SYMPTOMS: ABDOMINAL PAIN: 1

## 2019-02-23 NOTE — PROGRESS NOTES
Trauma / Surgical Daily Progress Note    Date of Service  2/23/2019    Chief Complaint  58 y.o. male admitted 2/18/2019 with Intra-abdominal abscess (HCC)    Interval Events  Doing better with no nausea. Ostomy functioning. Ongoing leukocytosis. Plan Abd CT today to rule out abscess.    Review of Systems  Review of Systems   Gastrointestinal: Positive for abdominal pain.        Vital Signs  Temp:  [36.2 °C (97.2 °F)-38.5 °C (101.3 °F)] 36.6 °C (97.8 °F)  Pulse:  [64-97] 64  Resp:  [16] 16  BP: (114-131)/(67-84) 114/73  SpO2:  [90 %-97 %] 96 %    Physical Exam  Physical Exam   Constitutional: He appears well-developed.   HENT:   Head: Normocephalic.   Neck: Neck supple.   Cardiovascular: Normal rate.    Pulmonary/Chest: Effort normal.   Abdominal: Soft. He exhibits no distension. There is tenderness.   Incision dry  Ostomy pink, functioning.   Musculoskeletal: Normal range of motion.   Neurological: He is alert.   Skin: Skin is warm.       Laboratory  Recent Results (from the past 24 hour(s))   CBC WITHOUT DIFFERENTIAL    Collection Time: 02/23/19  3:23 AM   Result Value Ref Range    WBC 20.5 (H) 4.8 - 10.8 K/uL    RBC 3.03 (L) 4.70 - 6.10 M/uL    Hemoglobin 8.8 (L) 14.0 - 18.0 g/dL    Hematocrit 27.2 (L) 42.0 - 52.0 %    MCV 89.8 81.4 - 97.8 fL    MCH 29.0 27.0 - 33.0 pg    MCHC 32.4 (L) 33.7 - 35.3 g/dL    RDW 52.3 (H) 35.9 - 50.0 fL    Platelet Count 349 164 - 446 K/uL    MPV 9.7 9.0 - 12.9 fL   Basic Metabolic Panel    Collection Time: 02/23/19  3:23 AM   Result Value Ref Range    Sodium 138 135 - 145 mmol/L    Potassium 3.2 (L) 3.6 - 5.5 mmol/L    Chloride 107 96 - 112 mmol/L    Co2 23 20 - 33 mmol/L    Glucose 104 (H) 65 - 99 mg/dL    Bun 12 8 - 22 mg/dL    Creatinine 0.68 0.50 - 1.40 mg/dL    Calcium 8.3 (L) 8.5 - 10.5 mg/dL    Anion Gap 8.0 0.0 - 11.9   ESTIMATED GFR    Collection Time: 02/23/19  3:23 AM   Result Value Ref Range    GFR If African American >60 >60 mL/min/1.73 m 2    GFR If Non African American  >60 >60 mL/min/1.73 m 2       Fluids    Intake/Output Summary (Last 24 hours) at 02/23/19 0954  Last data filed at 02/23/19 0943   Gross per 24 hour   Intake             1200 ml   Output             1300 ml   Net             -100 ml       Core Measures & Quality Metrics  Labs reviewed and Medications reviewed  Flor catheter: No Flor      DVT Prophylaxis: Enoxaparin (Lovenox)  DVT prophylaxis - mechanical: SCDs  Ulcer prophylaxis: Yes  Antibiotics: Treating active infection/contamination beyond 24 hours perioperative coverage  Assessed for rehab: Patient was assess for and/or received rehabilitation services during this hospitalization    YADIEL Score  ETOH Screening    Assessment/Plan  Diverticulitis of colon with perforation- (present on admission)   Assessment & Plan    Perforated diverticulitis, with multiple abdominal abscesses  2/18 - Colectomy/colostomy  2/23 - Ongoing leukocytosis - Abd CT to rule out abscess         Discussed patient condition with Family, RN and Patient.  CRITICAL CARE TIME EXCLUDING PROCEDURES:20   minutes

## 2019-02-23 NOTE — PROGRESS NOTES
Pt A&Ox4  Vitals stable  Low output in colostomy but (+) for flatulence   Pt having abdominal pain. Educated on pain management and non-pharmalogical methods.   Pt requesting pain medication Q4 with relief  Fall precautions in place  Hourly rounding completed

## 2019-02-23 NOTE — CARE PLAN
Problem: Bowel/Gastric:  Goal: Normal bowel function is maintained or improved  Outcome: PROGRESSING AS EXPECTED  Pt still remains NPO with sips. Little stool noted in colostomy bag. (+) flatulence     Problem: Pain Management  Goal: Pain level will decrease to patient's comfort goal  Outcome: PROGRESSING AS EXPECTED  Pt educated on pain medication and side effects. Pt still requesting his PRN medications when they are due

## 2019-02-23 NOTE — PROGRESS NOTES
Report received from night RN, assumed Care.   Patient is AOx4, responds appropriately.      Pain controlled at this time with oral oxycodone.  Medicating with zofran to prevent nausea.   Reports gout pain is decreasing. Swelling and redness still noted to joints.   Patient is tolerating sips of clears diet, denies nausea/vomiting. + flatus in ostomy, no other output noted in ostomy.  Up self with steady gait.  IS max pull 1000, encouraged frequent use.   Midline is approximated, INES, no drainage. Abdomen is semi firm.  Patient states he had his best night sleep since he has been here.     Plan of care discussed, all questions answered.    Daughter to bedside.   MD Napoles to bedside.   Explained importance of calling before getting OOB and pt verbalizes understanding.       Call light and belongings within reach, treaded slipper socks on, SCD refused at this time, bed in lowest locked position.  Hourly rounding in place, all needs met at this time

## 2019-02-24 LAB
ANION GAP SERPL CALC-SCNC: 5 MMOL/L (ref 0–11.9)
BASOPHILS # BLD AUTO: 0.3 % (ref 0–1.8)
BASOPHILS # BLD: 0.05 K/UL (ref 0–0.12)
BUN SERPL-MCNC: 9 MG/DL (ref 8–22)
CALCIUM SERPL-MCNC: 8.1 MG/DL (ref 8.5–10.5)
CHLORIDE SERPL-SCNC: 108 MMOL/L (ref 96–112)
CO2 SERPL-SCNC: 23 MMOL/L (ref 20–33)
CREAT SERPL-MCNC: 0.7 MG/DL (ref 0.5–1.4)
EOSINOPHIL # BLD AUTO: 0.02 K/UL (ref 0–0.51)
EOSINOPHIL NFR BLD: 0.1 % (ref 0–6.9)
ERYTHROCYTE [DISTWIDTH] IN BLOOD BY AUTOMATED COUNT: 50.6 FL (ref 35.9–50)
GLUCOSE SERPL-MCNC: 122 MG/DL (ref 65–99)
HCT VFR BLD AUTO: 26.1 % (ref 42–52)
HGB BLD-MCNC: 8.5 G/DL (ref 14–18)
IMM GRANULOCYTES # BLD AUTO: 0.74 K/UL (ref 0–0.11)
IMM GRANULOCYTES NFR BLD AUTO: 4.3 % (ref 0–0.9)
LYMPHOCYTES # BLD AUTO: 1.27 K/UL (ref 1–4.8)
LYMPHOCYTES NFR BLD: 7.3 % (ref 22–41)
MCH RBC QN AUTO: 28.6 PG (ref 27–33)
MCHC RBC AUTO-ENTMCNC: 32.6 G/DL (ref 33.7–35.3)
MCV RBC AUTO: 87.9 FL (ref 81.4–97.8)
MONOCYTES # BLD AUTO: 0.98 K/UL (ref 0–0.85)
MONOCYTES NFR BLD AUTO: 5.7 % (ref 0–13.4)
NEUTROPHILS # BLD AUTO: 14.26 K/UL (ref 1.82–7.42)
NEUTROPHILS NFR BLD: 82.3 % (ref 44–72)
NRBC # BLD AUTO: 0 K/UL
NRBC BLD-RTO: 0 /100 WBC
PLATELET # BLD AUTO: 348 K/UL (ref 164–446)
PMV BLD AUTO: 9.6 FL (ref 9–12.9)
POTASSIUM SERPL-SCNC: 3.3 MMOL/L (ref 3.6–5.5)
RBC # BLD AUTO: 2.97 M/UL (ref 4.7–6.1)
SODIUM SERPL-SCNC: 136 MMOL/L (ref 135–145)
WBC # BLD AUTO: 17.3 K/UL (ref 4.8–10.8)

## 2019-02-24 PROCEDURE — 80048 BASIC METABOLIC PNL TOTAL CA: CPT

## 2019-02-24 PROCEDURE — A9270 NON-COVERED ITEM OR SERVICE: HCPCS | Performed by: NURSE PRACTITIONER

## 2019-02-24 PROCEDURE — 700102 HCHG RX REV CODE 250 W/ 637 OVERRIDE(OP): Performed by: NURSE PRACTITIONER

## 2019-02-24 PROCEDURE — 700102 HCHG RX REV CODE 250 W/ 637 OVERRIDE(OP): Performed by: SURGERY

## 2019-02-24 PROCEDURE — 700111 HCHG RX REV CODE 636 W/ 250 OVERRIDE (IP): Performed by: SURGERY

## 2019-02-24 PROCEDURE — A9270 NON-COVERED ITEM OR SERVICE: HCPCS | Performed by: SURGERY

## 2019-02-24 PROCEDURE — 36415 COLL VENOUS BLD VENIPUNCTURE: CPT

## 2019-02-24 PROCEDURE — 700105 HCHG RX REV CODE 258

## 2019-02-24 PROCEDURE — 700111 HCHG RX REV CODE 636 W/ 250 OVERRIDE (IP): Performed by: NURSE PRACTITIONER

## 2019-02-24 PROCEDURE — 85025 COMPLETE CBC W/AUTO DIFF WBC: CPT

## 2019-02-24 PROCEDURE — 770006 HCHG ROOM/CARE - MED/SURG/GYN SEMI*

## 2019-02-24 PROCEDURE — 700105 HCHG RX REV CODE 258: Performed by: SURGERY

## 2019-02-24 RX ORDER — SODIUM CHLORIDE 9 MG/ML
INJECTION, SOLUTION INTRAVENOUS
Status: COMPLETED
Start: 2019-02-24 | End: 2019-02-24

## 2019-02-24 RX ADMIN — TAMSULOSIN HYDROCHLORIDE 0.4 MG: 0.4 CAPSULE ORAL at 09:12

## 2019-02-24 RX ADMIN — POTASSIUM CHLORIDE: 2 INJECTION, SOLUTION, CONCENTRATE INTRAVENOUS at 01:06

## 2019-02-24 RX ADMIN — ONDANSETRON 4 MG: 2 INJECTION INTRAMUSCULAR; INTRAVENOUS at 23:28

## 2019-02-24 RX ADMIN — INDOMETHACIN 50 MG: 50 CAPSULE ORAL at 14:59

## 2019-02-24 RX ADMIN — INDOMETHACIN 50 MG: 50 CAPSULE ORAL at 18:01

## 2019-02-24 RX ADMIN — OXYCODONE HYDROCHLORIDE 5 MG: 5 TABLET ORAL at 19:24

## 2019-02-24 RX ADMIN — OXYCODONE HYDROCHLORIDE 5 MG: 5 TABLET ORAL at 01:05

## 2019-02-24 RX ADMIN — SODIUM CHLORIDE: 9 INJECTION, SOLUTION INTRAVENOUS at 01:15

## 2019-02-24 RX ADMIN — MEROPENEM 1 G: 1 INJECTION, POWDER, FOR SOLUTION INTRAVENOUS at 21:55

## 2019-02-24 RX ADMIN — METOCLOPRAMIDE 10 MG: 5 INJECTION, SOLUTION INTRAMUSCULAR; INTRAVENOUS at 01:05

## 2019-02-24 RX ADMIN — MEROPENEM 1 G: 1 INJECTION, POWDER, FOR SOLUTION INTRAVENOUS at 05:07

## 2019-02-24 RX ADMIN — OXYCODONE HYDROCHLORIDE 5 MG: 5 TABLET ORAL at 23:28

## 2019-02-24 RX ADMIN — OXYCODONE HYDROCHLORIDE 5 MG: 5 TABLET ORAL at 10:08

## 2019-02-24 RX ADMIN — ACETAMINOPHEN 650 MG: 325 TABLET, FILM COATED ORAL at 09:12

## 2019-02-24 RX ADMIN — ALLOPURINOL 200 MG: 100 TABLET ORAL at 05:07

## 2019-02-24 RX ADMIN — OXYCODONE HYDROCHLORIDE 5 MG: 5 TABLET ORAL at 14:59

## 2019-02-24 RX ADMIN — INDOMETHACIN 50 MG: 50 CAPSULE ORAL at 01:05

## 2019-02-24 RX ADMIN — ONDANSETRON 4 MG: 2 INJECTION INTRAMUSCULAR; INTRAVENOUS at 01:05

## 2019-02-24 RX ADMIN — SODIUM CHLORIDE 500 ML: 9 INJECTION, SOLUTION INTRAVENOUS at 01:06

## 2019-02-24 RX ADMIN — INDOMETHACIN 50 MG: 50 CAPSULE ORAL at 05:07

## 2019-02-24 RX ADMIN — MEROPENEM 1 G: 1 INJECTION, POWDER, FOR SOLUTION INTRAVENOUS at 14:59

## 2019-02-24 RX ADMIN — METOCLOPRAMIDE 10 MG: 5 INJECTION, SOLUTION INTRAMUSCULAR; INTRAVENOUS at 05:07

## 2019-02-24 RX ADMIN — ENOXAPARIN SODIUM 40 MG: 100 INJECTION SUBCUTANEOUS at 05:07

## 2019-02-24 RX ADMIN — INDOMETHACIN 50 MG: 50 CAPSULE ORAL at 23:52

## 2019-02-24 RX ADMIN — OXYCODONE HYDROCHLORIDE 5 MG: 5 TABLET ORAL at 05:07

## 2019-02-24 RX ADMIN — ONDANSETRON 4 MG: 2 INJECTION INTRAMUSCULAR; INTRAVENOUS at 18:06

## 2019-02-24 NOTE — PROGRESS NOTES
Bedside report received. Assessment completed.  Pt is A&O x4. Pt on room air.   Medicating for pain PRN per MAR  Denies nausea.   - numbness, - tingling.  Midline abdominal incision with staples aaron  Ostomy in place with scant brown stool present  Abdomen distended, firm and tender   Gout redness and swelling in joints   +void.  Tolerating diet.   Pt up 1 assist with FWW. Tolerates well.   Call light within reach. All needs met at this time. Fall Precautions and hourly rounding in place.  Family anxious with many questions.

## 2019-02-24 NOTE — PROGRESS NOTES
2/24  S:  58 y.o.male s/p Open LAR with End Colostomy  POD# 6.  Patient doing better today, having colostomy output, starting to tolerate fluids PO, denies any nausea or emesis.  Wants to advance diet today if possible.  He had CT done yesterday which did not show any evidence of abscess.  Ambulate with some assistance and pain is well controlled.  Does complain of some scrotal swelling.      O:  Blood pressure 116/73, pulse 69, temperature 36.8 °C (98.2 °F), temperature source Temporal, resp. rate 18, height 1.829 m (6'), weight 73.3 kg (161 lb 9.6 oz), SpO2 92 %.  I/O last 3 completed shifts:  In: 2520 [P.O.:720; I.V.:1700]  Out: 1925 [Urine:1725]  Recent Labs      02/22/19   0409  02/23/19   0323  02/24/19   0309   SODIUM  136  138  136   POTASSIUM  3.3*  3.2*  3.3*   CHLORIDE  106  107  108   CO2  22  23  23   GLUCOSE  88  104*  122*   BUN  13  12  9   CREATININE  0.75  0.68  0.70   CALCIUM  9.0  8.3*  8.1*     Recent Labs      02/22/19   0409  02/23/19   0323  02/24/19   0309   WBC  19.6*  20.5*  17.3*   RBC  3.06*  3.03*  2.97*   HEMOGLOBIN  8.9*  8.8*  8.5*   HEMATOCRIT  27.6*  27.2*  26.1*   MCV  90.2  89.8  87.9   MCH  29.1  29.0  28.6   MCHC  32.2*  32.4*  32.6*   RDW  52.6*  52.3*  50.6*   PLATELETCT  371  349  348   MPV  10.3  9.7  9.6       Alert and Oriented x3, No Acute Distress  Normal Respiratory Effort  Abdomen soft, appropriately tender  Incisions/Bandages clean/dry/intact  Extremities warm and well perfused  Ostomy pink and healthy, output semiformed stool    A/P:  Pain control with PO meds  Diet as tolerated- GI soft today  Fluids SLIV  Ambulate tid and ad meir  Continue IV Abx for now.  Still with leukocytosis, but improving slowly and no abscess noted on CT.  Pan-sensitive E Coli, so should be able to transition to PO tomorrow if he is tolerating solids without issue.

## 2019-02-24 NOTE — PROGRESS NOTES
MD Napoles called this RN. Informed that she would like a page when the results are posted from the CT scan.     Will page or have night RN page.     Patient started drinking oral contrast at 1600.  Patient signed consent for PIV contrast.

## 2019-02-24 NOTE — WOUND TEAM
"[facility]  Inpatient Services  New Ostomy Management & Teaching    HPI:  Reviewed  PMH: Reviewed   SH: Reviewed    Subjective: \"I know how it's done\"    Objective: appliance intact, daughter at bedside and using phone, recorded process of changing ostomy appliance, very involved with care     Ostomy type:   end colostomy   Stoma location:   Q   Stoma assessment:    Appearance:  red, moist, round    Size: ~1.38\"    Protrusion:  Mildly budded    Output:  none    MC jxn intact    Peristomal skin:intact     Ostomy Appliance (type and size):   two piece 2 1/4\" with paste ring    Interventions:  Met with patient and daughter.  Secure start information obtained.  Patient cut barrier with some difficulty due to gout flare up.  He removed old appliance and cleansed peristomal skin.  He applied paste ring to barrier opening and then with some assistance applied barrier to peristomal skin. Snapped on pouch with some assistance and closed end of pouch.     Pt education: Questions and concerns addressed, see above    Evaluation:  new colostomy, gout flare up which is difficult for him to use fingers well at this time; possible abscess formation that will require IR drain so patient will be here for a few days.  Daughter requesting joint visit with his sister next visit.  Time scheduled.     Plan: Ostomy nurses to continue to follow for ostomy needs and teaching     Anticipated discharge needs: Supplies, supplier information, possible HH or outpatient ostomy clinic   "

## 2019-02-24 NOTE — PROGRESS NOTES
Dr. Napoles paged regarding abdominal CT results. Received orders to change Zosyn abx to meropenem. Pharmacy notified.

## 2019-02-25 LAB
ANION GAP SERPL CALC-SCNC: 6 MMOL/L (ref 0–11.9)
BUN SERPL-MCNC: 7 MG/DL (ref 8–22)
CALCIUM SERPL-MCNC: 8.3 MG/DL (ref 8.5–10.5)
CHLORIDE SERPL-SCNC: 108 MMOL/L (ref 96–112)
CO2 SERPL-SCNC: 23 MMOL/L (ref 20–33)
CREAT SERPL-MCNC: 0.6 MG/DL (ref 0.5–1.4)
ERYTHROCYTE [DISTWIDTH] IN BLOOD BY AUTOMATED COUNT: 51.4 FL (ref 35.9–50)
GLUCOSE SERPL-MCNC: 113 MG/DL (ref 65–99)
HCT VFR BLD AUTO: 26.2 % (ref 42–52)
HGB BLD-MCNC: 8.5 G/DL (ref 14–18)
MCH RBC QN AUTO: 28.6 PG (ref 27–33)
MCHC RBC AUTO-ENTMCNC: 32.4 G/DL (ref 33.7–35.3)
MCV RBC AUTO: 88.2 FL (ref 81.4–97.8)
PLATELET # BLD AUTO: 351 K/UL (ref 164–446)
PMV BLD AUTO: 9.7 FL (ref 9–12.9)
POTASSIUM SERPL-SCNC: 3.3 MMOL/L (ref 3.6–5.5)
RBC # BLD AUTO: 2.97 M/UL (ref 4.7–6.1)
SODIUM SERPL-SCNC: 137 MMOL/L (ref 135–145)
WBC # BLD AUTO: 13.5 K/UL (ref 4.8–10.8)

## 2019-02-25 PROCEDURE — 700102 HCHG RX REV CODE 250 W/ 637 OVERRIDE(OP): Performed by: SURGERY

## 2019-02-25 PROCEDURE — 36415 COLL VENOUS BLD VENIPUNCTURE: CPT

## 2019-02-25 PROCEDURE — 700111 HCHG RX REV CODE 636 W/ 250 OVERRIDE (IP): Performed by: SURGERY

## 2019-02-25 PROCEDURE — A9270 NON-COVERED ITEM OR SERVICE: HCPCS | Performed by: SURGERY

## 2019-02-25 PROCEDURE — A9270 NON-COVERED ITEM OR SERVICE: HCPCS | Performed by: NURSE PRACTITIONER

## 2019-02-25 PROCEDURE — 85027 COMPLETE CBC AUTOMATED: CPT

## 2019-02-25 PROCEDURE — 700105 HCHG RX REV CODE 258: Performed by: SURGERY

## 2019-02-25 PROCEDURE — 770006 HCHG ROOM/CARE - MED/SURG/GYN SEMI*

## 2019-02-25 PROCEDURE — 700102 HCHG RX REV CODE 250 W/ 637 OVERRIDE(OP): Performed by: NURSE PRACTITIONER

## 2019-02-25 PROCEDURE — 80048 BASIC METABOLIC PNL TOTAL CA: CPT

## 2019-02-25 RX ORDER — AMOXICILLIN AND CLAVULANATE POTASSIUM 875; 125 MG/1; MG/1
1 TABLET, FILM COATED ORAL 2 TIMES DAILY
Status: DISCONTINUED | OUTPATIENT
Start: 2019-02-25 | End: 2019-02-26

## 2019-02-25 RX ADMIN — ALLOPURINOL 200 MG: 100 TABLET ORAL at 05:35

## 2019-02-25 RX ADMIN — OXYCODONE HYDROCHLORIDE 5 MG: 5 TABLET ORAL at 12:43

## 2019-02-25 RX ADMIN — INDOMETHACIN 50 MG: 50 CAPSULE ORAL at 05:35

## 2019-02-25 RX ADMIN — AMOXICILLIN AND CLAVULANATE POTASSIUM 1 TABLET: 875; 125 TABLET, FILM COATED ORAL at 12:43

## 2019-02-25 RX ADMIN — INDOMETHACIN 50 MG: 50 CAPSULE ORAL at 17:53

## 2019-02-25 RX ADMIN — OXYCODONE HYDROCHLORIDE 5 MG: 5 TABLET ORAL at 17:53

## 2019-02-25 RX ADMIN — INDOMETHACIN 50 MG: 50 CAPSULE ORAL at 12:43

## 2019-02-25 RX ADMIN — ENOXAPARIN SODIUM 40 MG: 100 INJECTION SUBCUTANEOUS at 05:35

## 2019-02-25 RX ADMIN — OXYCODONE HYDROCHLORIDE 5 MG: 5 TABLET ORAL at 03:49

## 2019-02-25 RX ADMIN — OXYCODONE HYDROCHLORIDE 5 MG: 5 TABLET ORAL at 08:19

## 2019-02-25 RX ADMIN — AMOXICILLIN AND CLAVULANATE POTASSIUM 1 TABLET: 875; 125 TABLET, FILM COATED ORAL at 17:53

## 2019-02-25 RX ADMIN — TAMSULOSIN HYDROCHLORIDE 0.4 MG: 0.4 CAPSULE ORAL at 08:19

## 2019-02-25 RX ADMIN — MEROPENEM 1 G: 1 INJECTION, POWDER, FOR SOLUTION INTRAVENOUS at 05:35

## 2019-02-25 RX ADMIN — OXYCODONE HYDROCHLORIDE 5 MG: 5 TABLET ORAL at 21:56

## 2019-02-25 NOTE — PROGRESS NOTES
Surgical Progress Note:    POD #7 S/P Exploratory celiotomy, sigmoid colectomy with end colostomy and washout of multiple abdominal abscesses from perforated diverticulitis.      Doing well. - N/V, + FLATUS/ + ostomy output, Pain controlled, Denies chest pain or SOB.  Ambulating.  gout flare improving.  Was able to tolerate some PO yesterday    PE:  /72   Pulse 75   Temp 36.4 °C (97.6 °F) (Temporal)   Resp 18   Ht 1.829 m (6')   Wt 73.3 kg (161 lb 9.6 oz)   SpO2 93%   BMI 21.92 kg/m²     I/O:   Intake/Output Summary (Last 24 hours) at 02/19/19 0937  Last data filed at 02/19/19 0800   Gross per 24 hour   Intake          2376.67 ml   Output              410 ml   Net          1966.67 ml         Review of Systems   Constitutional: Negative.  Negative for chills and fever.   Respiratory: Negative for shortness of breath.    Cardiovascular: Negative for chest pain.   Gastrointestinal: Negative for nausea and vomiting.        +flatus/no stool   Genitourinary: Negative.      Physical Exam   Constitutional:  appears well-developed.   Neck: Neck supple.   Cardiovascular: Normal rate.    Pulmonary/Chest: Effort normal.   Abdominal: Soft.  exhibits mild distension. Tympanic with palpation.  Appropriate tenderness.   Incisions clean, dry, and intact   Stoma pink and viable  Musculoskeletal: Normal range of motion.   Neurological:  alert.   Skin:  Warm and dry.   Extremities: najera, no edema    Labs:  Recent Labs      02/23/19 0323 02/24/19 0309  02/25/19   0253   WBC  20.5*  17.3*  13.5*   RBC  3.03*  2.97*  2.97*   HEMOGLOBIN  8.8*  8.5*  8.5*   HEMATOCRIT  27.2*  26.1*  26.2*   MCV  89.8  87.9  88.2   MCH  29.0  28.6  28.6   RDW  52.3*  50.6*  51.4*   PLATELETCT  349  348  351   MPV  9.7  9.6  9.7   NEUTSPOLYS   --   82.30*   --    LYMPHOCYTES   --   7.30*   --    MONOCYTES   --   5.70   --    EOSINOPHILS   --   0.10   --    BASOPHILS   --   0.30   --      Recent Labs      02/23/19 0323  02/24/19   0309   02/25/19   0253   SODIUM  138  136  137   POTASSIUM  3.2*  3.3*  3.3*   CHLORIDE  107  108  108   CO2  23  23  23   GLUCOSE  104*  122*  113*   BUN  12  9  7*         A/P:     - regular diet  - IS Q One hour while awake  - Ambulate.  Out of bed for all meals please  - Pain controlled.  Cont PO pain medication  - Labs noted, WBC's trending down, recheck in am  - Meropenum, cont today, will change to augmentin for possible d/c tomorrow, Final cultures and sensitivities noted  - DVT propholaxis, ok to cont Lovenox, sequentials and ambulate  - Improved urine output.  Cont, to monitor  - cont gout meds  - cont to monitor for alcohol withdrawl  - possible d/c tomorrow    Diverticulitis of colon with perforation- (present on admission)   Assessment & Plan    Perforated diverticulitis, with multiple abdominal abscesses  2/18 - Colectomy/colostomy  2/23 - Ongoing leukocytosis - Abd CT, no abscess         Discussed with Patient, RN and Dr. Candice Lee, A.P.HIMA  [facility]  [phone]

## 2019-02-25 NOTE — FACE TO FACE
Face to Face Supporting Documentation - Home Health    The encounter with this patient was in whole or in part the primary reason for home health admission.    Date of encounter:   Patient:                    MRN:                       YOB: 2020  Marbin Ladd  [id number]  1960     Home health to see patient for:  Wound Care    Skilled need for:  Surgical Aftercare ostomy    Skilled nursing interventions to include:  Wound Care    Homebound status evidenced by:  Needs the assistance of another person in order to leave the home. Leaving home requires a considerable and taxing effort. There is a normal inability to leave the home.    Community Physician to provide follow up care: Pcp Pt States None     Optional Interventions? No      I certify the face to face encounter for this home health care referral meets the CMS requirements and the encounter/clinical assessment with the patient was, in whole, or in part, for the medical condition(s) listed above, which is the primary reason for home health care. Based on my clinical findings: the service(s) are medically necessary, support the need for home health care, and the homebound criteria are met.  I certify that this patient has had a face to face encounter by myself.  Jan Elias M.D. - NPI: [id number]

## 2019-02-25 NOTE — PROGRESS NOTES
/71   Pulse 69   Temp 36.6 °C (97.9 °F) (Temporal)   Resp 18   Ht 1.829 m (6')   Wt 73.3 kg (161 lb 9.6 oz)   SpO2 91%   BMI 21.92 kg/m²     Patient is A&Ox4.   Denies pain at this time.   LYNN, CMS intact, denies numbness and tingling.   Mobilizes with SBA, educated to call for assistance.   On RA, denies SOB or chest pain   Hypoactive BS x 4. Tolerating GI soft diet. Denies N&V  + flatus and + output from colostomy. Stoma is red. Appliance is intact.   Patient is voiding.   Midline abdominal incision aaron, approximated.   PIV SL  Updated on POC. Belongings and call light within reach. All needs met at this time.

## 2019-02-25 NOTE — PROGRESS NOTES
Bedside report received. Assessment completed.  Pt is A&O x4. Pt on room air.   Medicating for pain PRN per MAR  Denies nausea.   - numbness, - tingling.  Midline abdominal incision with staples aaron  Ostomy in place with scant brown stool present, pt comfortable performing care  Abdomen distended, firm and tender   Gout redness and swelling in joints   +void.  Tolerating diet.   Pt up SBA with FWW. Tolerates well.   Call light within reach. All needs met at this time. Fall Precautions and hourly rounding in place.    Family anxious with many questions

## 2019-02-25 NOTE — PROGRESS NOTES
Pt daughter called at 0800 and asked this RN if pt received pain meds this am.  RN replied the NOC shift medicated early this am and the patient is currently sleeping.  Pt daughter asked RN to go wake up the patient and give him his pain medication.  This RN informed patient that is not appropriate according to our policy.  She stated she was sure that's what the patient wants and asked to speak to charge RN.    Charge RN updated.    Pt daughter called the patient to wake him up.  This RN rounded on patient who stated he does not want to be woken up to be given pain medications and understands pain meds are as needed.  Pt states he will ask for pain medication as needed.  Also educated on non-pharm pain control.

## 2019-02-25 NOTE — CARE PLAN
Problem: Safety  Goal: Will remain free from injury  Outcome: PROGRESSING AS EXPECTED  Safety precautions in place. Bed in locked/low position. 2 side rails up. Treaded socks. Call light in reach, calls appropriately. Hourly rounding practiced.    Problem: Bowel/Gastric:  Goal: Normal bowel function is maintained or improved  Outcome: PROGRESSING AS EXPECTED  Tolerating diet. Monitoring for nausea/vomiting.

## 2019-02-26 VITALS
WEIGHT: 161.6 LBS | BODY MASS INDEX: 21.89 KG/M2 | HEART RATE: 67 BPM | HEIGHT: 72 IN | RESPIRATION RATE: 18 BRPM | DIASTOLIC BLOOD PRESSURE: 76 MMHG | TEMPERATURE: 99.2 F | SYSTOLIC BLOOD PRESSURE: 123 MMHG | OXYGEN SATURATION: 94 %

## 2019-02-26 PROBLEM — G89.18 PAIN FOLLOWING SURGERY OR PROCEDURE: Status: ACTIVE | Noted: 2019-02-26

## 2019-02-26 LAB
ANION GAP SERPL CALC-SCNC: 8 MMOL/L (ref 0–11.9)
BUN SERPL-MCNC: 6 MG/DL (ref 8–22)
CALCIUM SERPL-MCNC: 8.9 MG/DL (ref 8.5–10.5)
CHLORIDE SERPL-SCNC: 109 MMOL/L (ref 96–112)
CO2 SERPL-SCNC: 25 MMOL/L (ref 20–33)
CREAT SERPL-MCNC: 0.61 MG/DL (ref 0.5–1.4)
ERYTHROCYTE [DISTWIDTH] IN BLOOD BY AUTOMATED COUNT: 50.9 FL (ref 35.9–50)
GLUCOSE SERPL-MCNC: 116 MG/DL (ref 65–99)
HCT VFR BLD AUTO: 27 % (ref 42–52)
HGB BLD-MCNC: 8.8 G/DL (ref 14–18)
MCH RBC QN AUTO: 28.5 PG (ref 27–33)
MCHC RBC AUTO-ENTMCNC: 32.6 G/DL (ref 33.7–35.3)
MCV RBC AUTO: 87.4 FL (ref 81.4–97.8)
PLATELET # BLD AUTO: 401 K/UL (ref 164–446)
PMV BLD AUTO: 9.5 FL (ref 9–12.9)
POTASSIUM SERPL-SCNC: 3.5 MMOL/L (ref 3.6–5.5)
RBC # BLD AUTO: 3.09 M/UL (ref 4.7–6.1)
SODIUM SERPL-SCNC: 142 MMOL/L (ref 135–145)
WBC # BLD AUTO: 12.4 K/UL (ref 4.8–10.8)

## 2019-02-26 PROCEDURE — 700102 HCHG RX REV CODE 250 W/ 637 OVERRIDE(OP): Performed by: SURGERY

## 2019-02-26 PROCEDURE — A9270 NON-COVERED ITEM OR SERVICE: HCPCS | Performed by: SURGERY

## 2019-02-26 PROCEDURE — A9270 NON-COVERED ITEM OR SERVICE: HCPCS | Performed by: NURSE PRACTITIONER

## 2019-02-26 PROCEDURE — 80048 BASIC METABOLIC PNL TOTAL CA: CPT

## 2019-02-26 PROCEDURE — 700102 HCHG RX REV CODE 250 W/ 637 OVERRIDE(OP): Performed by: NURSE PRACTITIONER

## 2019-02-26 PROCEDURE — 700111 HCHG RX REV CODE 636 W/ 250 OVERRIDE (IP): Performed by: SURGERY

## 2019-02-26 PROCEDURE — 36415 COLL VENOUS BLD VENIPUNCTURE: CPT

## 2019-02-26 PROCEDURE — 85027 COMPLETE CBC AUTOMATED: CPT

## 2019-02-26 RX ORDER — AMOXICILLIN AND CLAVULANATE POTASSIUM 875; 125 MG/1; MG/1
1 TABLET, FILM COATED ORAL 3 TIMES DAILY
Status: DISCONTINUED | OUTPATIENT
Start: 2019-02-26 | End: 2019-02-26 | Stop reason: HOSPADM

## 2019-02-26 RX ORDER — AMOXICILLIN AND CLAVULANATE POTASSIUM 875; 125 MG/1; MG/1
1 TABLET, FILM COATED ORAL 3 TIMES DAILY
Qty: 15 TAB | Refills: 0 | Status: SHIPPED | OUTPATIENT
Start: 2019-02-26 | End: 2019-03-03

## 2019-02-26 RX ORDER — TRAMADOL HYDROCHLORIDE 50 MG/1
50 TABLET ORAL EVERY 4 HOURS PRN
Qty: 20 TAB | Refills: 0 | Status: SHIPPED | OUTPATIENT
Start: 2019-02-26 | End: 2019-03-05

## 2019-02-26 RX ADMIN — INDOMETHACIN 50 MG: 50 CAPSULE ORAL at 14:25

## 2019-02-26 RX ADMIN — OXYCODONE HYDROCHLORIDE 5 MG: 5 TABLET ORAL at 01:59

## 2019-02-26 RX ADMIN — INDOMETHACIN 50 MG: 50 CAPSULE ORAL at 06:02

## 2019-02-26 RX ADMIN — ENOXAPARIN SODIUM 40 MG: 100 INJECTION SUBCUTANEOUS at 06:02

## 2019-02-26 RX ADMIN — OXYCODONE HYDROCHLORIDE 5 MG: 5 TABLET ORAL at 10:23

## 2019-02-26 RX ADMIN — OXYCODONE HYDROCHLORIDE 5 MG: 5 TABLET ORAL at 14:25

## 2019-02-26 RX ADMIN — OXYCODONE HYDROCHLORIDE 5 MG: 5 TABLET ORAL at 06:02

## 2019-02-26 RX ADMIN — ALLOPURINOL 200 MG: 100 TABLET ORAL at 06:02

## 2019-02-26 RX ADMIN — TAMSULOSIN HYDROCHLORIDE 0.4 MG: 0.4 CAPSULE ORAL at 10:22

## 2019-02-26 RX ADMIN — INDOMETHACIN 50 MG: 50 CAPSULE ORAL at 00:01

## 2019-02-26 RX ADMIN — AMOXICILLIN AND CLAVULANATE POTASSIUM 1 TABLET: 875; 125 TABLET, FILM COATED ORAL at 14:25

## 2019-02-26 RX ADMIN — AMOXICILLIN AND CLAVULANATE POTASSIUM 1 TABLET: 875; 125 TABLET, FILM COATED ORAL at 06:02

## 2019-02-26 NOTE — PROGRESS NOTES
Surgical Progress Note:    POD #8 S/P Exploratory celiotomy, sigmoid colectomy with end colostomy and washout of multiple abdominal abscesses from perforated diverticulitis.      Doing well. - N/V, + FLATUS/ + ostomy output, Pain controlled, Denies chest pain or SOB.  Ambulating.  gout flare improving.  Was able to tolerate some PO yesterday.  Wants to go home.  Doesn't feel he needs home health.     PE:  /77   Pulse 70   Temp 36.3 °C (97.3 °F) (Temporal)   Resp 18   Ht 1.829 m (6')   Wt 73.3 kg (161 lb 9.6 oz)   SpO2 93%   BMI 21.92 kg/m²     I/O:   Intake/Output Summary (Last 24 hours) at 02/19/19 0937  Last data filed at 02/19/19 0800   Gross per 24 hour   Intake          2376.67 ml   Output              410 ml   Net          1966.67 ml         Review of Systems   Constitutional: Negative.  Negative for chills and fever.   Respiratory: Negative for shortness of breath.    Cardiovascular: Negative for chest pain.   Gastrointestinal: Negative for nausea and vomiting.        +flatus/+ stool   Genitourinary: Negative.      Physical Exam   Constitutional:  appears well-developed.   Neck: Neck supple.   Cardiovascular: Normal rate.    Pulmonary/Chest: Effort normal.   Abdominal: Soft.  exhibits mild distension. Tympanic with palpation.  Appropriate tenderness.   Incisions clean, dry, and intact   Stoma pink and viable  Musculoskeletal: Normal range of motion.   Neurological:  alert.   Skin:  Warm and dry.   Extremities: najera, no edema    Labs:  Recent Labs      02/24/19   0309  02/25/19   0253  02/26/19   0235   WBC  17.3*  13.5*  12.4*   RBC  2.97*  2.97*  3.09*   HEMOGLOBIN  8.5*  8.5*  8.8*   HEMATOCRIT  26.1*  26.2*  27.0*   MCV  87.9  88.2  87.4   MCH  28.6  28.6  28.5   RDW  50.6*  51.4*  50.9*   PLATELETCT  348  351  401   MPV  9.6  9.7  9.5   NEUTSPOLYS  82.30*   --    --    LYMPHOCYTES  7.30*   --    --    MONOCYTES  5.70   --    --    EOSINOPHILS  0.10   --    --    BASOPHILS  0.30   --    --       Recent Labs      02/24/19   0309  02/25/19   0253  02/26/19   0235   SODIUM  136  137  142   POTASSIUM  3.3*  3.3*  3.5*   CHLORIDE  108  108  109   CO2  23  23  25   GLUCOSE  122*  113*  116*   BUN  9  7*  6*         A/P:     - regular low residue diet  - IS Q One hour while awake  - Ambulate.  Out of bed for all meals please  - Pain controlled.  Cont PO pain medication  - Labs noted, WBC's trending down,   - was changed to augmentin for possible d/c today Final cultures and sensitivities noted  - DVT propholaxis, ok to cont Lovenox, sequentials and ambulate  - Improved urine output.  Cont, to monitor  - cont gout meds  - cont to monitor for alcohol withdrawl  - Will D/C home. Discharge instructions given.  Precautions and limitations reviewed.  Pt stated understanding and agrees with this plan of care.  F/U in one week and prn.     Diverticulitis of colon with perforation- (present on admission)   Assessment & Plan    Perforated diverticulitis, with multiple abdominal abscesses  2/18 - Colectomy/colostomy  2/23 - Ongoing leukocytosis - Abd CT, no abscess         Discussed with Patient, RN and Dr. Dony Lee, A.P.N.  [facility]  [phone]

## 2019-02-26 NOTE — DISCHARGE PLANNING
Anticipated Discharge Disposition: HHC VS Home with Outpatient Wound Care.     Action: LSW received order for HHC.  LSW met with patient and patient's wife at bedside.  Patient indicates he does not have a PCP and prefers to have outpatient services, MD notified via [employer].     LSW spoke with Ann at the [facility].  Per Ann, patient's appointment will be confirm once MD provides the order, MD aware.     Barriers to Discharge: None.     Plan: Home with Outpatient Wound Care, pending orders.

## 2019-02-26 NOTE — PROGRESS NOTES
Discharging Patient home per physician order.  Discharged with Friend.  Demonstrated understanding of discharge instructions, follow up appointments, home medications, prescriptions, home care for surgical wound and nursing care instructions for dressing changes.  Ambulating with standby assistance, voiding without difficulty, pain well controlled, tolerating oral medications, oxygen saturation greater than 90%, tolerating diet.  Educational handouts given and discussed.  Verbalized understanding of discharge instructions and educational handouts.  All questions answered.  Belongings with patient at time of discharge.

## 2019-02-26 NOTE — CARE PLAN
Problem: Safety  Goal: Will remain free from injury  Outcome: PROGRESSING AS EXPECTED  Safety precautions in place. Bed in locked/low position. 2 side rails up. Treaded socks. Call light in reach, calls appropriately. Hourly rounding practiced.    Problem: Knowledge Deficit  Goal: Knowledge of disease process/condition, treatment plan, diagnostic tests, and medications will improve  Outcome: PROGRESSING AS EXPECTED  Pt providing own ostomy care.     Problem: Pain Management  Goal: Pain level will decrease to patient's comfort goal  Outcome: PROGRESSING AS EXPECTED  Pain managed with Oxycodone PRN

## 2019-02-26 NOTE — PROGRESS NOTES
/67   Pulse 70   Temp 36.6 °C (97.8 °F) (Temporal)   Resp 18   Ht 1.829 m (6')   Wt 73.3 kg (161 lb 9.6 oz)   SpO2 93%   BMI 21.92 kg/m²     Patient is A&Ox4.   Reports pain to abdomen, medicated per MAR  LEAH, CMS intact, denies numbness and tingling.   Mobilizes independently with steady gait, calls for assistance.   On RA, denies SOB or chest pain; achieves 1000 on IS.   Normoactive BS x 4. Tolerating GI soft diet. Denies N&V.   Positive flatus, but no output at this time from colostomy. Appliance is intact.   Patient is voiding.   Midline abdominal incision is approximated, open to air.   PIV SL   Updated on POC. Belongings and call light within reach. All needs met at this time.

## 2019-02-26 NOTE — WOUND TEAM
Met with patient for continued ostomy instruction.  He reports that he changed appliance independently this AM after shower.  He verbalized what he did and seemed appropriate and appliance intact with stoma red per pouch.  Marked catalog with supplies and discussed how to order and role of home health with regards to supplies.  He has supplies for discharge and also discussed emptying pouch and use of closed end pouch which he will get samples.  Also instructed how to follow up at outpatient clinic at 4-6 weeks post op for any ostomy concerns.  No further follow up indicated and patient to discharge home tomorrow.

## 2019-02-26 NOTE — PROGRESS NOTES
Order received to remove staples from midline incision.  RN and student RN removed half of staples and the incision began to open slightly.  Charge RN notified.  RN paged PA who came to bedside and placed steri strips  When the patient started moving around there was slight bloody drainage from the incision which was alarming to the patient's daughter.  RN assessed the drainage which seemed to have stopped.  Dr Elias came to bedside, opened the steri strips, informed the patient to keep a dry dressing over the incision and let it drain.  RN educated pt on proper dry dressing techniques.  Pt verbalized understanding and is comfortable caring for incision at home.

## 2019-02-26 NOTE — DISCHARGE PLANNING
Anticipated Discharge Disposition: Home with Outpatient Wound Care.    Action: LSW met with patient at bedside.  Patient reports he has scheduled an appointment with a new PCP.  Patient is aware that the lack of PCP is a barrier to arrange HHC.  Patient is also aware that he will follow up at the [facility] until he establishes himself with a new PCP who can arrange HHC, if necessary.    Patient's appointment at the [facility] is scheduled for Monday March 4, 2019 at 10:00am, patient and RN aware.    Barriers to Discharge: None.    Plan: Home with Outpatient Wound Care, pending medical clearance.

## 2019-02-26 NOTE — DISCHARGE INSTRUCTIONS
Discharge Instructions    Discharged to home by car with relative. Discharged via wheelchair, hospital escort: Yes.  Special equipment needed: Not Applicable    Be sure to schedule a follow-up appointment with your primary care doctor or any specialists as instructed.     Discharge Plan:   Diet Plan: Discussed  Activity Level: Discussed  Confirmed Follow up Appointment: Patient to Call and Schedule Appointment  Confirmed Symptoms Management: Discussed  Medication Reconciliation Updated: Yes  Influenza Vaccine Indication: Patient Refuses    I understand that a diet low in cholesterol, fat, and sodium is recommended for good health. Unless I have been given specific instructions below for another diet, I accept this instruction as my diet prescription.   Other diet: Soft diet as tolerated    Special Instructions:   Monitor for signs and symptoms of infection (fever, chills, nausea, vomiting)  Monitor incisions for swelling, redness, or drainage      Keep dry dressing (gauze and tape) over incision. Keep incision clean.  Call Dr Elias or Dr Napoles office with any concerns or if incision continues to drain.  You may shower, but Change dressing after showering and keep dry.  No heavy lifting >10lbs until cleared by Doctor.  Take stool softeners and/or Miralax for constipation      Colectomy D/C instructions:     1. DIET: A low fiber diet is recommended.     The following foods are generally allowed on a low-fiber diet:     Enriched white bread or rolls without seeds   White rice, plain white pasta, noodles and macaroni   Crackers   Refined cereals such as Cream of Wheat   Pancakes or waffles made from white refined flour   Most canned or cooked fruits without skins, seeds or membranes   Fruit and vegetable juice with little or no pulp, fruit-flavored drinks and flavored branch   Canned or well-cooked vegetables without seeds, hulls or skins, such as carrots, potatoes and tomatoes   Tender meat, poultry and fish   Eggs    Tofu   Creamy peanut butter -- up to 2 tablespoons a day   Milk and foods made from milk, such as yogurt, pudding, ice cream, cheeses and sour cream -- up to 2 cups a day, including any used in cooking   Butter, margarine, oils and salad dressings without seeds   Desserts with no whole grains, seeds, nuts, raisins or coconut     You should avoid the following foods:     Whole-wheat or whole-grain breads, cereals and pasta   Brown or wild rice and other whole grains such as oats, kasha, barley, quinoa   Dried fruits and prune juice   Raw fruit, including those with seeds, skin or membranes, such as berries   Raw or undercooked vegetables, including corn   Dried beans, peas and lentils   Seeds and nuts, and foods containing them   Coconut   Popcorn     If you're eating a low-fiber diet, a typical menu might look like this:     Breakfast:   1 glass milk   1 egg   1 slice of white toast with smooth jelly   1/2 cup canned peaches   Snack:   1 cup yogurt   Lunch:   1 to 2 cups of chicken noodle soup   Soda crackers   [place] of drained tuna with mayonnaise or salad dressing on white bread   Canned applesauce   Flavored water or iced tea     Snack:   White toast, bread or crackers   2 tablespoons creamy peanut butter   Flavored water     Dinner:   3 ounces lean meat, poultry or fish   1/2 cup white rice   1/2 cup cooked vegetables, such as carrots or green beans   1 enriched white dinner roll with butter   Hot tea     Prepare all foods so that they're tender. Good cooking methods include simmering, poaching, stewing, steaming and braising. Baking or microwaving in a covered dish is another option. Try to avoid roasting, broiling and grilling -- methods that tend to make foods dry and tough. You may also want to avoid fried foods and go easy on spices.   Keep in mind that you may have fewer bowel movements and smaller stools while you're following a low-fiber diet. To avoid constipation, you may need to drink extra  fluids. Drink plenty of water unless your doctor tells you otherwise, and use juices and milk as noted.     2. ACTIVITIES: After discharge from the hospital, you may resume full routine activities as you feel up to them.  You have a 10 pound weight restriction for two weeks after surgery. This means no lifting anything heavier than a gallon of milk. Routine activities such as bathing, walking, going up and down stairs, and driving* (see below) are safe.     3. DRIVING: You may drive whenever you are off pain medications and are able to perform the activities needed to drive, i.e. turning, bending, twisting, etc.     4. BATHING: no restrictions, unless you have a drain in place, in which case, showering is okay, but no baths or pools until after your first postoperative appointment.     5. PAIN MEDICATION: You will be given a prescription for pain medication at discharge. Please take these as directed. It is important to remember not to take medications on an empty stomach as this may cause nausea.     6. BOWEL FUNCTION: A few patients, after this operation, will develop either frequent or loose stools after meals. This usually corrects itself after a few days, to a few months.     7. APPOINTMENT: Contact our office at [phone] for a follow-up appointment in 1-2 weeks following your procedure.     8. CALL IF YOU HAVE: (1) Fevers to more than 101F, (2) Unusual chest or leg pain, (3) Drainage or fluid from incision that may be foul smelling, increased tenderness or soreness at the wound or the wound edges are no longer together, redness or swelling at the incision site. Please do not hesitate to call with any other questions.       If you have any additional questions, please do not hesitate to call the office and speak to either myself or the physician on call.     Dr. Napoles   [facility]   [phone]        Incision Care, Adult  An incision is a surgical cut that is made through your skin. Most  incisions are closed after surgery. Your incision may be closed with stitches (sutures), staples, skin glue, or adhesive strips. You may need to return to your health care provider to have sutures or staples removed. This may occur several days to several weeks after your surgery. The incision needs to be cared for properly to prevent infection.  How to care for your incision  Incision care   · Follow instructions from your health care provider about how to take care of your incision. Make sure you:  ¨ Wash your hands with soap and water before you change the bandage (dressing). If soap and water are not available, use hand .  ¨ Change your dressing as told by your health care provider.  ¨ Leave sutures, skin glue, or adhesive strips in place. These skin closures may need to stay in place for 2 weeks or longer. If adhesive strip edges start to loosen and curl up, you may trim the loose edges. Do not remove adhesive strips completely unless your health care provider tells you to do that.  · Check your incision area every day for signs of infection. Check for:  ¨ More redness, swelling, or pain.  ¨ More fluid or blood.  ¨ Warmth.  ¨ Pus or a bad smell.  · Ask your health care provider how to clean the incision. This may include:  ¨ Using mild soap and water.  ¨ Using a clean towel to pat the incision dry after cleaning it.  ¨ Applying a cream or ointment. Do this only as told by your health care provider.  ¨ Covering the incision with a clean dressing.  · Ask your health care provider when you can leave the incision uncovered.  · Do not take baths, swim, or use a hot tub until your health care provider approves. Ask your health care provider if you can take showers. You may only be allowed to take sponge baths for bathing.  Medicines  · If you were prescribed an antibiotic medicine, cream, or ointment, take or apply the antibiotic as told by your health care provider. Do not stop taking or applying the  antibiotic even if your condition improves.  · Take over-the-counter and prescription medicines only as told by your health care provider.  General instructions  · Limit movement around your incision to improve healing.  ¨ Avoid straining, lifting, or exercise for the first month, or for as long as told by your health care provider.  ¨ Follow instructions from your health care provider about returning to your normal activities.  ¨ Ask your health care provider what activities are safe.  · Protect your incision from the sun when you are outside for the first 6 months, or for as long as told by your health care provider. Apply sunscreen around the scar or cover it up.  · Keep all follow-up visits as told by your health care provider. This is important.  Contact a health care provider if:  · Your have more redness, swelling, or pain around the incision.  · You have more fluid or blood coming from the incision.  · Your incision feels warm to the touch.  · You have pus or a bad smell coming from the incision.  · You have a fever or shaking chills.  · You are nauseous or you vomit.  · You are dizzy.  · Your sutures or staples come undone.  Get help right away if:  · You have a red streak coming from your incision.  · Your incision bleeds through the dressing and the bleeding does not stop with gentle pressure.  · The edges of your incision open up and separate.  · You have severe pain.  · You have a rash.  · You are confused.  · You faint.  · You have trouble breathing and a fast heartbeat.  This information is not intended to replace advice given to you by your health care provider. Make sure you discuss any questions you have with your health care provider.  Document Released: 07/07/2006 Document Revised: 08/25/2017 Document Reviewed: 07/05/2017  [employer] Interactive Patient Education © 2017 [employer]      Colostomy, Adult  Introduction  A colostomy is a surgical procedure that involves attaching part of the colon  to the front of the abdomen (abdominal wall). The colon is the last part of the digestive tract. It is where water is absorbed from digested food to form stool (feces). A colostomy is done to redirect stool through an opening (stoma) in the abdominal wall.  You may need this surgery if you have a medical condition that prevents stool from leaving your body through the usual opening (rectum). A bag will be attached to the stoma on the outside of your body. This bag will collect the stool and waste that is redirected through the stoma. A colostomy may be temporary or permanent.  Tell a health care provider about:  · Any allergies you have.  · All medicines you are taking, including vitamins, herbs, eye drops, creams, and over-the-counter medicines.  · Any problems you or family members have had with anesthetic medicines.  · Any blood disorders you have.  · Any surgeries you have had.  · Any medical conditions you have.  · Whether you are pregnant or may be pregnant.  What are the risks?  Generally, this is a safe procedure. However, problems may occur, including:  · Infection.  · Bleeding.  · Allergic reactions to medicines.  · Damage to other structures or organs.  · Leaking of stool inside the abdomen.  · Formation of scar tissue that causes a blockage.  What happens before the procedure?  · Follow instructions from your health care provider about eating or drinking restrictions.  · Ask your health care provider about:  ¨ Changing or stopping your regular medicines. This is especially important if you are taking diabetes medicines or blood thinners.  ¨ Taking medicines such as aspirin and ibuprofen. These medicines can thin your blood. Do not take these medicines before your procedure if your health care provider instructs you not to.  · Do not use any tobacco products, such as cigarettes, chewing tobacco, and e-cigarettes. If you need help quitting, ask your health care provider.  · Plan to have someone take you  home after the procedure.  · You may have an exam or testing.  · Ask your health care provider how your surgical site will be marked or identified.  · You may be given antibiotic medicine to help prevent infection.  What happens during the procedure?  · To reduce your risk of infection:  ¨ Your health care team will wash or sanitize their hands.  ¨ Your skin will be washed with soap.  · An IV tube will be inserted into one of your veins.  · A drainage tube may be passed through your nose and into your stomach (NG tube, or nasogastric tube).  · You may be given a medicine to help you relax (sedative).  · You will be given a medicine to make you fall asleep (general anesthetic).  · An incision will be made in the front of your abdomen.  · The muscles under the skin will be divided or .  · The next steps will vary depending on the type of colostomy. There are two main types:  ¨ End colostomy. Part of the colon will be removed, or the colon will be divided into two separate parts. The end of the colon that is attached to the upper part of the digestive tract will be attached to the wall of the abdomen, creating a stoma. The other end will be closed off.  ¨ Loop colostomy. A part of the colon will be pulled through the incision in the abdomen. Two openings will be made in the side of the colon. The colon will be attached to the skin at these openings, creating the stoma.  · Stitches (sutures) will be used to create the stoma and close the incision.  · A colostomy bag will be placed over the stoma to collect stool and mucus.  The procedure may vary among health care providers and hospitals.  What happens after the procedure?  · Your blood pressure, heart rate, breathing rate, and blood oxygen level will be monitored often until the medicines you were given have worn off.  · You will be given pain medicine as needed.  · You will receive fluids and nutrition through an IV tube.  · As soon as you are eating well and  passing stool through the colostomy, the nasogastric tube and the IV tube may be removed.  · Do not drive for 24 hours if you received a sedative.  This information is not intended to replace advice given to you by your health care provider. Make sure you discuss any questions you have with your health care provider.  Document Released: 05/09/2012 Document Revised: 05/25/2017 Document Reviewed: 08/30/2016  © 2017 Elsevier      Ostomy Support Information  An ostomy is an opening in the belly (abdominal wall) made by surgery. Ostomates are people who have had this procedure. The opening (stoma) allows the kidney or bowel to discharge waste. An external pouch covers the stoma to collect waste. Pouches are are a simple bag and are odor free. Different companies have disposable or reusable pouches to fit one's lifestyle. An ostomy can either be temporary or permanent.   THERE ARE THREE MAIN TYPES OF OSTOMIES  · Colostomy. A colostomy is a surgically created opening in the large intestine (colon).   · Ileostomy. An ileostomy is a surgically created opening in the small intestine.   · Urostomy. A urostomy is a surgically created opening to divert urine away from the bladder.   FREQUENTLY ASKED QUESTIONS  Will I need to be on a special diet?  Most people return to their normal diet when they have recovered from surgery. Be sure to chew your food well, eat a well-balanced diet and drink plenty of fluids. If you experience problems with a certain food, wait a couple of weeks and try it again.  Will there be odor and noises?  Pouching systems are designed to be odor-proof or odor-resistant. There are deodorants that can be used in the pouch. Medications are also available to help reduce odor. Limit gas-producing foods and carbonated beverages. You will experience less gas and fewer noises as you heal from surgery.  How much time will it take to care for my ostomy?  At first, you may spend a lot of time learning about your  ostomy and how to take care of it. As you become more comfortable and skilled at changing the pouching system, it will take very little time to care for it.   Will I be able to return to work?  People with ostomies can perform most jobs. As soon as you have healed from surgery, you should be able to return to work. Heavy lifting (more than 10 pounds) may be discouraged.   What about intimacy?  Sexual relationships and intimacy are important and fulfilling aspects of your life. They should continue after ostomy surgery. Intimacy-related concerns should be discussed openly between you and your partner.   Can I wear regular clothing?  You do not need to wear special clothing. Ostomy pouches are fairly flat and barely noticeable. Elastic undergarments will not hurt the stoma or prevent the ostomy from functioning.   Can I participate in sports?  An ostomy should not limit your involvement in sports. Many people with ostomies are runners, skiers, swimmers or participate in other active lifestyles. Talk with your caregiver first before doing heavy physical activity.  PROFESSIONAL HELP   Resources are available if you need help or have questions about your ostomy.   · Specially trained nurses called Wound, Ostomy Continence Nurses (WOCN) are available for consultation in most major medical centers.   · The United Ostomy Association (UOA) is a group made up of many local chapters throughout the [place]. These local groups hold meetings and provide support to prospective and existing ostomates. They sponsor educational events and have qualified visitors to make personal or telephone visits. Contact the UOA for the chapter nearest you and for other educational publications.   · More detailed information can be found in Colostomy Guide, a publication of the United Ostomy Association (UOA). Contact UOA at 1-899.725.4540 or visit their web site at www.uoaa.org. The website contains links to other sites, suppliers and  resources.   Document Released: 12/20/2004 Document Revised: 03/11/2013 Document Reviewed: 04/21/2010  ExitCare® Patient Information ©2013 [employer].    Depression / Suicide Risk    As you are discharged from this [facility] facility, it is important to learn how to keep safe from harming yourself.    Recognize the warning signs:  · Abrupt changes in personality, positive or negative- including increase in energy   · Giving away possessions  · Change in eating patterns- significant weight changes-  positive or negative  · Change in sleeping patterns- unable to sleep or sleeping all the time   · Unwillingness or inability to communicate  · Depression  · Unusual sadness, discouragement and loneliness  · Talk of wanting to die  · Neglect of personal appearance   · Rebelliousness- reckless behavior  · Withdrawal from people/activities they love  · Confusion- inability to concentrate     If you or a loved one observes any of these behaviors or has concerns about self-harm, here's what you can do:  · Talk about it- your feelings and reasons for harming yourself  · Remove any means that you might use to hurt yourself (examples: pills, rope, extension cords, firearm)  · Get professional help from the community (Mental Health, Substance Abuse, psychological counseling)  · Do not be alone:Call your Safe Contact- someone whom you trust who will be there for you.  · Call your local CRISIS HOTLINE 057-7612 or 142-169-4403  · Call your local Children's Mobile Crisis Response Team [place] (083) 281-5020 or www.YellowHammer  · Call the toll free National Suicide Prevention Hotlines   · National Suicide Prevention Lifeline 336-135-IHVH (0222)  · National Hope Line Network 800-SUICIDE (869-0882)

## 2019-03-01 ENCOUNTER — APPOINTMENT (OUTPATIENT)
Dept: WOUND CARE | Facility: MEDICAL CENTER | Age: 59
End: 2019-03-01
Attending: SURGERY
Payer: MEDICAID

## 2019-03-04 ENCOUNTER — NON-PROVIDER VISIT (OUTPATIENT)
Dept: WOUND CARE | Facility: MEDICAL CENTER | Age: 59
End: 2019-03-04
Attending: SURGERY
Payer: MEDICAID

## 2019-03-04 PROCEDURE — 97597 DBRDMT OPN WND 1ST 20 CM/<: CPT

## 2019-03-04 PROCEDURE — 99211 OFF/OP EST MAY X REQ PHY/QHP: CPT

## 2019-03-04 NOTE — PROCEDURES
CSWD with forceps, after non-selective debridement with cotton tip applicator & normal saline moistened gauze, to midline abdominal wound once saturated steri-strips & large patch of tape were removed. Superior steri-strips covering approximated incision were left in place as clean, dry, intact.

## 2019-03-04 NOTE — PATIENT INSTRUCTIONS
Should you experience any significant changes in your wound(s) such as infection (redness, swelling, localized heat, increased pain, fever >101 F, chills) or have any questions regarding your home care instructions, please contact the wound center [phone]. If after hours, contact your primary care physician or go the hospital emergency room.  Keep dressing clean and dry and cover while bathing. Only change dressing if over saturated, soiled or its falling off or in 4 days. Please decide if you would like to have home health care for your wound & ostomy & medication assistance, or if you would like to return to [facility] for care; as you are not able to have both options.    Change colostomies every 5-7 days. Change appliance immediately if it is leaking or peristomal skin feels irritated, has itching, or  burning.   To change the appliance, remove previous appliance, cleanse peristomal skin with warm water/washcloth, pat dry, make an ostomy template or use cardboard measuring guide and trace ostomy shape onto back of barrier, cut out barrier, apply a paste ring around barrier opening and apply appliance. Empty pouches when no more than ½ full. Check contents every 2 hours or as needed. Do not leave soap residue on tissue and do not use baby wipes or skin prep wipes.

## 2019-03-04 NOTE — CERTIFICATION
Advanced Wound Care  [facility]  [address]  [address]  [place] [address]  [phone] Fax: [phone]      Initial Evaluation  For Certification Period: 3/4/19-6/4/19      Referring Physician: ADARSH GARDNER  Primary Physician:  SAWYER COCHRAN      Consulting Physicians:         Wound(s): midline abdominal incision dehiscence   Start of Care: 3/4/19        Subjective:        HPI: 58 year old male presents with midline abdominal incision dehiscence wound post surgery. On 2/18/19, Patient had an exploratory celiotomy, sigmoid colectomy with end colostomy and washout of multiple abdominal abscesses by Dr. Dianna Napoles due to Multiple intraabdominal abscesses & Perforated diverticulitis. Patient received ostomy care teaching in the hospital and feels he is independent with his ostomy care and denies any difficulties or complications; he states he has supplies.                Pain: midline abdomen when he ambulates           Past Medical History:   Past Medical History:   Diagnosis Date   • Gout      Current Medications:   Current Outpatient Prescriptions on File Prior to Visit   Medication Sig Dispense Refill   • tramadol (ULTRAM) 50 MG Tab Take 1 Tab by mouth every four hours as needed for up to 7 days. 20 Tab 0   • ibuprofen (MOTRIN) 200 MG Tab Take 800 mg by mouth every 6 hours as needed.     • indomethacin (INDOCIN) 50 MG Cap Take 50 mg by mouth every 6 hours.     • allopurinol (ZYLOPRIM) 100 MG Tab Take 200 mg by mouth every day.       No current facility-administered medications on file prior to visit.        Allergies: Vicodin [hydrocodone-acetaminophen]     Past Surgical History:   Past Surgical History:   Procedure Laterality Date   • EXPLORATORY LAPAROTOMY  2/18/2019    Procedure: EXPLORATORY LAPAROTOMY WITH SIGMOID COLECTOMY, COLOSTOMY CREATION  ABDOMINAL WASHOUT;  Surgeon: Dianna Napoles M.D.;  Location: SURGERY [facility];  Service: General       Social  History:    Social History     Social History   • Marital status: Single     Spouse name: N/A   • Number of children: N/A   • Years of education: N/A     Occupational History   • Not on file.     Social History Main Topics   • Smoking status: Never Smoker   • Smokeless tobacco: Never Used   • Alcohol use Yes      Comment: rare   • Drug use: Yes     Types: Inhaled      Comment: thc rare   • Sexual activity: Not on file     Other Topics Concern   • Not on file     Social History Narrative   • No narrative on file         Objective:      Tests and Measures: 2/18/19 Peritoneal Fluid heavy growth bacterial culture    Orthotic, protective, supportive devices: none    Fall Risk Assessment (kareen all that apply with an X):              65 years or older                xFall within the last 2 years, uses   Ambulatory devices   Loss of protective sensation in feet,    Use of prostethic/orthotic, years               Presence of lower extremity/foot/toe amputation              xTaking medication that increases risk (per facility policy)        Incision 02/18/19 (Active)   Wound Image    3/4/2019 10:00 AM   Site Assessment Red 3/4/2019 10:00 AM   Tosha-wound Assessment Intact 3/4/2019 10:00 AM   Margins Attached edges 3/4/2019 10:00 AM   Wound Length (cm) 2.6 cm 3/4/2019 10:00 AM   Wound Width (cm) 0.9 cm 3/4/2019 10:00 AM   Wound Surface Area (cm^2) 2.34 cm^2 3/4/2019 10:00 AM   Post Wound Length (cm) 2.6 cm 3/4/2019 10:00 AM    Post Wound Width (cm) 0.9 cm 3/4/2019 10:00 AM   Post Wound Depth (cm) 0.5 cm 3/4/2019 10:00 AM   Post Wound Surface Area (cm^2) 2.34 cm^2 3/4/2019 10:00 AM   Tunneling 0 cm 3/4/2019 10:00 AM   Undermining 0 cm 3/4/2019 10:00 AM   Closure Secondary intention 3/4/2019 10:00 AM   Drainage Amount Moderate 3/4/2019 10:00 AM   Drainage Description Serosanguineous 3/4/2019 10:00 AM   Treatments Cleansed;Site care 3/4/2019 10:00 AM   Periwound Protectant Barrier Paste;Skin Protectant wipes to Periwound 3/4/2019  10:00 AM   Dressing Options Adhesive Foam;Hydrofiber Silver 3/4/2019 10:00 AM   Dressing Changed New 3/4/2019 10:00 AM   Dressing Status Clean;Dry;Intact 3/4/2019 10:00 AM   WOUND NURSE ONLY - Odor None 3/4/2019 10:00 AM   WOUND NURSE ONLY - Exposed Structures Other (Comments) 3/4/2019 10:00 AM   WOUND NURSE ONLY - Tissue Type and Percentage 100% dark red moist post debridement 3/4/2019 10:00 AM         Patient Education: Should you experience any significant changes in your wound(s) such as infection (redness, swelling, localized heat, increased pain, fever >101 F, chills) or have any questions regarding your home care instructions, please contact the wound center [phone]. If after hours, contact your primary care physician or go the hospital emergency room.  Keep dressing clean and dry and cover while bathing. Only change dressing if over saturated, soiled or its falling off or in 4 days. Please decide if you would like to have home health care for your wound & ostomy & medication assistance, or if you would like to return to [facility] for care; as you are not able to have both options.    Change colostomies every 5-7 days. Change appliance immediately if it is leaking or peristomal skin feels irritated, has itching, or  burning.   To change the appliance, remove previous appliance, cleanse peristomal skin with warm water/washcloth, pat dry, make an ostomy template or use cardboard measuring guide and trace ostomy shape onto back of barrier, cut out barrier, apply a paste ring around barrier opening and apply appliance. Empty pouches when no more than ½ full. Check contents every 2 hours or as needed. Do not leave soap residue on tissue and do not use baby wipes or skin prep wipes.     Professional Collaboration: initial evaluation sent to referring provider; Verbal order from CORY Perez to evaluate & treat midline abdominal wound. Ordered supplies from [facility] for family to change at home prior to  receiving home health or returning to [facility].      Assessment:      Wound etiology: post surgical midline dehiscence     Wound Progress:  Increased viable tissue post debridement    Rationale for Treatment: manage drainage, antimicrobial    Patient tolerance/compliance: dad & patient verbalize understanding to all education    Complicating factors: infection    Need for ongoing Advanced Wound Care services: Patient requires skilled therapeutic wound care services for product selection, application of product, debridement, close monitoring with clinical assessment for expedite of wound healing; or home health services.        Plan:      Treatment Plan and Recommendations:  Diagnosis/ICD10: K57.20 (ICD-10-CM) - Diverticulitis of large intestine with perforation, unspecified bleeding status    Procedures/CPT: CSWD, non-selective debridement    Frequency: 1x/week      At the time of each visit a thorough assessment of the patient is completed to assure the  appropriateness of our plan of care.  The dressings or modalities may need to be adapted   from the original plan to address any significant changes in the wound environment.          Clinician Signature:_______________________________Date__________________      Physician Signature:______________________________Date:__________________

## 2019-03-06 ENCOUNTER — HOSPITAL ENCOUNTER (INPATIENT)
Facility: MEDICAL CENTER | Age: 59
LOS: 3 days | DRG: 862 | End: 2019-03-10
Attending: EMERGENCY MEDICINE | Admitting: SURGERY
Payer: MEDICAID

## 2019-03-06 ENCOUNTER — APPOINTMENT (OUTPATIENT)
Dept: RADIOLOGY | Facility: MEDICAL CENTER | Age: 59
DRG: 862 | End: 2019-03-06
Attending: EMERGENCY MEDICINE
Payer: MEDICAID

## 2019-03-06 DIAGNOSIS — G89.18 PAIN FOLLOWING SURGERY OR PROCEDURE: ICD-10-CM

## 2019-03-06 DIAGNOSIS — T81.43XA POSTPROCEDURAL INTRAABDOMINAL ABSCESS: ICD-10-CM

## 2019-03-06 LAB
ALBUMIN SERPL BCP-MCNC: 3.6 G/DL (ref 3.2–4.9)
ALBUMIN/GLOB SERPL: 0.9 G/DL
ALP SERPL-CCNC: 318 U/L (ref 30–99)
ALT SERPL-CCNC: 18 U/L (ref 2–50)
ANION GAP SERPL CALC-SCNC: 13 MMOL/L (ref 0–11.9)
APPEARANCE UR: CLEAR
APTT PPP: 41.7 SEC (ref 24.7–36)
AST SERPL-CCNC: 23 U/L (ref 12–45)
BASOPHILS # BLD AUTO: 0.7 % (ref 0–1.8)
BASOPHILS # BLD: 0.08 K/UL (ref 0–0.12)
BILIRUB SERPL-MCNC: 0.8 MG/DL (ref 0.1–1.5)
BILIRUB UR QL STRIP.AUTO: NEGATIVE
BNP SERPL-MCNC: 42 PG/ML (ref 0–100)
BUN SERPL-MCNC: 15 MG/DL (ref 8–22)
CALCIUM SERPL-MCNC: 10.1 MG/DL (ref 8.5–10.5)
CHLORIDE SERPL-SCNC: 104 MMOL/L (ref 96–112)
CO2 SERPL-SCNC: 18 MMOL/L (ref 20–33)
COLOR UR: YELLOW
CREAT SERPL-MCNC: 0.68 MG/DL (ref 0.5–1.4)
EKG IMPRESSION: NORMAL
EOSINOPHIL # BLD AUTO: 0.05 K/UL (ref 0–0.51)
EOSINOPHIL NFR BLD: 0.4 % (ref 0–6.9)
ERYTHROCYTE [DISTWIDTH] IN BLOOD BY AUTOMATED COUNT: 50.7 FL (ref 35.9–50)
FLUAV RNA SPEC QL NAA+PROBE: NEGATIVE
FLUBV RNA SPEC QL NAA+PROBE: NEGATIVE
GLOBULIN SER CALC-MCNC: 4.1 G/DL (ref 1.9–3.5)
GLUCOSE SERPL-MCNC: 112 MG/DL (ref 65–99)
GLUCOSE UR STRIP.AUTO-MCNC: NEGATIVE MG/DL
HCT VFR BLD AUTO: 32.9 % (ref 42–52)
HGB BLD-MCNC: 10.5 G/DL (ref 14–18)
IMM GRANULOCYTES # BLD AUTO: 0.15 K/UL (ref 0–0.11)
IMM GRANULOCYTES NFR BLD AUTO: 1.2 % (ref 0–0.9)
INR PPP: 1.3 (ref 0.87–1.13)
KETONES UR STRIP.AUTO-MCNC: NEGATIVE MG/DL
LACTATE BLD-SCNC: 1.4 MMOL/L (ref 0.5–2)
LEUKOCYTE ESTERASE UR QL STRIP.AUTO: NEGATIVE
LIPASE SERPL-CCNC: 8 U/L (ref 11–82)
LYMPHOCYTES # BLD AUTO: 1.68 K/UL (ref 1–4.8)
LYMPHOCYTES NFR BLD: 13.8 % (ref 22–41)
MCH RBC QN AUTO: 27.8 PG (ref 27–33)
MCHC RBC AUTO-ENTMCNC: 31.9 G/DL (ref 33.7–35.3)
MCV RBC AUTO: 87 FL (ref 81.4–97.8)
MICRO URNS: ABNORMAL
MONOCYTES # BLD AUTO: 0.62 K/UL (ref 0–0.85)
MONOCYTES NFR BLD AUTO: 5.1 % (ref 0–13.4)
NEUTROPHILS # BLD AUTO: 9.59 K/UL (ref 1.82–7.42)
NEUTROPHILS NFR BLD: 78.8 % (ref 44–72)
NITRITE UR QL STRIP.AUTO: NEGATIVE
NRBC # BLD AUTO: 0 K/UL
NRBC BLD-RTO: 0 /100 WBC
PH UR STRIP.AUTO: 6.5 [PH]
PLATELET # BLD AUTO: 774 K/UL (ref 164–446)
PMV BLD AUTO: 9.2 FL (ref 9–12.9)
POTASSIUM SERPL-SCNC: 4.1 MMOL/L (ref 3.6–5.5)
PROT SERPL-MCNC: 7.7 G/DL (ref 6–8.2)
PROT UR QL STRIP: NEGATIVE MG/DL
PROTHROMBIN TIME: 16.3 SEC (ref 12–14.6)
RBC # BLD AUTO: 3.78 M/UL (ref 4.7–6.1)
RBC UR QL AUTO: NEGATIVE
SODIUM SERPL-SCNC: 135 MMOL/L (ref 135–145)
SP GR UR STRIP.AUTO: >=1.045
TROPONIN I SERPL-MCNC: <0.01 NG/ML (ref 0–0.04)
UROBILINOGEN UR STRIP.AUTO-MCNC: 0.2 MG/DL
WBC # BLD AUTO: 12.2 K/UL (ref 4.8–10.8)

## 2019-03-06 PROCEDURE — 96366 THER/PROPH/DIAG IV INF ADDON: CPT

## 2019-03-06 PROCEDURE — 74177 CT ABD & PELVIS W/CONTRAST: CPT

## 2019-03-06 PROCEDURE — 93005 ELECTROCARDIOGRAM TRACING: CPT | Performed by: EMERGENCY MEDICINE

## 2019-03-06 PROCEDURE — 85610 PROTHROMBIN TIME: CPT

## 2019-03-06 PROCEDURE — 87040 BLOOD CULTURE FOR BACTERIA: CPT

## 2019-03-06 PROCEDURE — 700117 HCHG RX CONTRAST REV CODE 255: Performed by: EMERGENCY MEDICINE

## 2019-03-06 PROCEDURE — 80053 COMPREHEN METABOLIC PANEL: CPT

## 2019-03-06 PROCEDURE — 83605 ASSAY OF LACTIC ACID: CPT

## 2019-03-06 PROCEDURE — 87502 INFLUENZA DNA AMP PROBE: CPT

## 2019-03-06 PROCEDURE — 83880 ASSAY OF NATRIURETIC PEPTIDE: CPT

## 2019-03-06 PROCEDURE — 71045 X-RAY EXAM CHEST 1 VIEW: CPT

## 2019-03-06 PROCEDURE — 700105 HCHG RX REV CODE 258: Performed by: EMERGENCY MEDICINE

## 2019-03-06 PROCEDURE — 99285 EMERGENCY DEPT VISIT HI MDM: CPT

## 2019-03-06 PROCEDURE — 83690 ASSAY OF LIPASE: CPT

## 2019-03-06 PROCEDURE — G0378 HOSPITAL OBSERVATION PER HR: HCPCS

## 2019-03-06 PROCEDURE — 85025 COMPLETE CBC W/AUTO DIFF WBC: CPT

## 2019-03-06 PROCEDURE — 85730 THROMBOPLASTIN TIME PARTIAL: CPT

## 2019-03-06 PROCEDURE — 96376 TX/PRO/DX INJ SAME DRUG ADON: CPT

## 2019-03-06 PROCEDURE — 87086 URINE CULTURE/COLONY COUNT: CPT

## 2019-03-06 PROCEDURE — 81003 URINALYSIS AUTO W/O SCOPE: CPT

## 2019-03-06 PROCEDURE — 96375 TX/PRO/DX INJ NEW DRUG ADDON: CPT

## 2019-03-06 PROCEDURE — 700111 HCHG RX REV CODE 636 W/ 250 OVERRIDE (IP): Performed by: EMERGENCY MEDICINE

## 2019-03-06 PROCEDURE — 84484 ASSAY OF TROPONIN QUANT: CPT

## 2019-03-06 RX ORDER — SODIUM CHLORIDE 9 MG/ML
INJECTION, SOLUTION INTRAVENOUS CONTINUOUS
Status: DISCONTINUED | OUTPATIENT
Start: 2019-03-06 | End: 2019-03-10 | Stop reason: HOSPADM

## 2019-03-06 RX ORDER — SODIUM CHLORIDE 9 MG/ML
1000 INJECTION, SOLUTION INTRAVENOUS ONCE
Status: COMPLETED | OUTPATIENT
Start: 2019-03-06 | End: 2019-03-06

## 2019-03-06 RX ORDER — HYDROMORPHONE HYDROCHLORIDE 1 MG/ML
0.5 INJECTION, SOLUTION INTRAMUSCULAR; INTRAVENOUS; SUBCUTANEOUS
Status: COMPLETED | OUTPATIENT
Start: 2019-03-06 | End: 2019-03-07

## 2019-03-06 RX ORDER — ONDANSETRON 2 MG/ML
4 INJECTION INTRAMUSCULAR; INTRAVENOUS EVERY 6 HOURS PRN
Status: DISCONTINUED | OUTPATIENT
Start: 2019-03-06 | End: 2019-03-10 | Stop reason: HOSPADM

## 2019-03-06 RX ORDER — ONDANSETRON 2 MG/ML
4 INJECTION INTRAMUSCULAR; INTRAVENOUS ONCE
Status: COMPLETED | OUTPATIENT
Start: 2019-03-06 | End: 2019-03-06

## 2019-03-06 RX ORDER — HYDROMORPHONE HYDROCHLORIDE 1 MG/ML
0.5 INJECTION, SOLUTION INTRAMUSCULAR; INTRAVENOUS; SUBCUTANEOUS
Status: DISCONTINUED | OUTPATIENT
Start: 2019-03-06 | End: 2019-03-06

## 2019-03-06 RX ORDER — AMOXICILLIN AND CLAVULANATE POTASSIUM 875; 125 MG/1; MG/1
1 TABLET, FILM COATED ORAL 2 TIMES DAILY
Status: ON HOLD | COMMUNITY
Start: 2019-02-26 | End: 2019-03-10

## 2019-03-06 RX ADMIN — SODIUM CHLORIDE 1000 ML: 9 INJECTION, SOLUTION INTRAVENOUS at 18:16

## 2019-03-06 RX ADMIN — HYDROMORPHONE HYDROCHLORIDE 0.5 MG: 1 INJECTION, SOLUTION INTRAMUSCULAR; INTRAVENOUS; SUBCUTANEOUS at 18:16

## 2019-03-06 RX ADMIN — HYDROMORPHONE HYDROCHLORIDE 0.5 MG: 1 INJECTION, SOLUTION INTRAMUSCULAR; INTRAVENOUS; SUBCUTANEOUS at 21:34

## 2019-03-06 RX ADMIN — SODIUM CHLORIDE: 9 INJECTION, SOLUTION INTRAVENOUS at 22:54

## 2019-03-06 RX ADMIN — ONDANSETRON 4 MG: 2 INJECTION INTRAMUSCULAR; INTRAVENOUS at 18:17

## 2019-03-06 RX ADMIN — PIPERACILLIN AND TAZOBACTAM 3.38 G: 3; .375 INJECTION, POWDER, LYOPHILIZED, FOR SOLUTION INTRAVENOUS; PARENTERAL at 22:54

## 2019-03-06 RX ADMIN — PIPERACILLIN AND TAZOBACTAM 3.38 G: 3; .375 INJECTION, POWDER, LYOPHILIZED, FOR SOLUTION INTRAVENOUS; PARENTERAL at 20:47

## 2019-03-06 RX ADMIN — IOHEXOL 50 ML: 240 INJECTION, SOLUTION INTRATHECAL; INTRAVASCULAR; INTRAVENOUS; ORAL at 20:07

## 2019-03-06 RX ADMIN — IOHEXOL 100 ML: 350 INJECTION, SOLUTION INTRAVENOUS at 20:05

## 2019-03-06 ASSESSMENT — LIFESTYLE VARIABLES
ALCOHOL_USE: NO
EVER_SMOKED: NEVER

## 2019-03-06 ASSESSMENT — COPD QUESTIONNAIRES
COPD SCREENING SCORE: 1
IN THE PAST 12 MONTHS DO YOU DO LESS THAN YOU USED TO BECAUSE OF YOUR BREATHING PROBLEMS: DISAGREE/UNSURE
DURING THE PAST 4 WEEKS HOW MUCH DID YOU FEEL SHORT OF BREATH: NONE/LITTLE OF THE TIME
DO YOU EVER COUGH UP ANY MUCUS OR PHLEGM?: NO/ONLY WITH OCCASIONAL COLDS OR INFECTIONS
HAVE YOU SMOKED AT LEAST 100 CIGARETTES IN YOUR ENTIRE LIFE: NO/DON'T KNOW

## 2019-03-06 NOTE — ED TRIAGE NOTES
WC to triage w/ c/o generalized weakness, abd cramping, chest pain, sob & increased output in colostomy.  Pt is s/p abd surgery w/ colostomy secondary to a perforated diverticulitis on 2/18.  Pt denies any blood in ostomy output.  Pt is somewhat pale.

## 2019-03-07 ENCOUNTER — APPOINTMENT (OUTPATIENT)
Dept: RADIOLOGY | Facility: MEDICAL CENTER | Age: 59
DRG: 862 | End: 2019-03-07
Attending: SURGERY
Payer: MEDICAID

## 2019-03-07 PROCEDURE — 160002 HCHG RECOVERY MINUTES (STAT)

## 2019-03-07 PROCEDURE — 87186 SC STD MICRODIL/AGAR DIL: CPT

## 2019-03-07 PROCEDURE — 87205 SMEAR GRAM STAIN: CPT

## 2019-03-07 PROCEDURE — 99153 MOD SED SAME PHYS/QHP EA: CPT

## 2019-03-07 PROCEDURE — 87070 CULTURE OTHR SPECIMN AEROBIC: CPT

## 2019-03-07 PROCEDURE — 700111 HCHG RX REV CODE 636 W/ 250 OVERRIDE (IP): Performed by: EMERGENCY MEDICINE

## 2019-03-07 PROCEDURE — 700102 HCHG RX REV CODE 250 W/ 637 OVERRIDE(OP): Performed by: RADIOLOGY

## 2019-03-07 PROCEDURE — A9270 NON-COVERED ITEM OR SERVICE: HCPCS | Performed by: RADIOLOGY

## 2019-03-07 PROCEDURE — 700105 HCHG RX REV CODE 258: Performed by: EMERGENCY MEDICINE

## 2019-03-07 PROCEDURE — 700111 HCHG RX REV CODE 636 W/ 250 OVERRIDE (IP)

## 2019-03-07 PROCEDURE — 87077 CULTURE AEROBIC IDENTIFY: CPT

## 2019-03-07 PROCEDURE — 96376 TX/PRO/DX INJ SAME DRUG ADON: CPT

## 2019-03-07 PROCEDURE — 770006 HCHG ROOM/CARE - MED/SURG/GYN SEMI*

## 2019-03-07 PROCEDURE — 700111 HCHG RX REV CODE 636 W/ 250 OVERRIDE (IP): Performed by: SURGERY

## 2019-03-07 PROCEDURE — C1769 GUIDE WIRE: HCPCS

## 2019-03-07 PROCEDURE — 0W9G30Z DRAINAGE OF PERITONEAL CAVITY WITH DRAINAGE DEVICE, PERCUTANEOUS APPROACH: ICD-10-PCS | Performed by: RADIOLOGY

## 2019-03-07 PROCEDURE — 700101 HCHG RX REV CODE 250

## 2019-03-07 RX ORDER — SODIUM CHLORIDE 9 MG/ML
500 INJECTION, SOLUTION INTRAVENOUS
Status: DISCONTINUED | OUTPATIENT
Start: 2019-03-07 | End: 2019-03-07 | Stop reason: HOSPADM

## 2019-03-07 RX ORDER — ONDANSETRON 2 MG/ML
4 INJECTION INTRAMUSCULAR; INTRAVENOUS PRN
Status: DISCONTINUED | OUTPATIENT
Start: 2019-03-07 | End: 2019-03-07 | Stop reason: HOSPADM

## 2019-03-07 RX ORDER — OXYCODONE HYDROCHLORIDE 10 MG/1
10 TABLET ORAL
Status: DISCONTINUED | OUTPATIENT
Start: 2019-03-07 | End: 2019-03-08

## 2019-03-07 RX ORDER — MIDAZOLAM HYDROCHLORIDE 1 MG/ML
.5-2 INJECTION INTRAMUSCULAR; INTRAVENOUS PRN
Status: DISCONTINUED | OUTPATIENT
Start: 2019-03-07 | End: 2019-03-07 | Stop reason: HOSPADM

## 2019-03-07 RX ORDER — OXYCODONE HYDROCHLORIDE 5 MG/1
5 TABLET ORAL
Status: DISCONTINUED | OUTPATIENT
Start: 2019-03-07 | End: 2019-03-08

## 2019-03-07 RX ORDER — FLUMAZENIL 0.1 MG/ML
INJECTION INTRAVENOUS
Status: COMPLETED
Start: 2019-03-07 | End: 2019-03-07

## 2019-03-07 RX ORDER — MIDAZOLAM HYDROCHLORIDE 1 MG/ML
INJECTION INTRAMUSCULAR; INTRAVENOUS
Status: COMPLETED
Start: 2019-03-07 | End: 2019-03-07

## 2019-03-07 RX ORDER — NALOXONE HYDROCHLORIDE 0.4 MG/ML
INJECTION, SOLUTION INTRAMUSCULAR; INTRAVENOUS; SUBCUTANEOUS
Status: COMPLETED
Start: 2019-03-07 | End: 2019-03-07

## 2019-03-07 RX ORDER — LIDOCAINE HYDROCHLORIDE 10 MG/ML
INJECTION, SOLUTION INFILTRATION; PERINEURAL
Status: COMPLETED
Start: 2019-03-07 | End: 2019-03-07

## 2019-03-07 RX ORDER — MORPHINE SULFATE 4 MG/ML
2-4 INJECTION, SOLUTION INTRAMUSCULAR; INTRAVENOUS
Status: DISCONTINUED | OUTPATIENT
Start: 2019-03-07 | End: 2019-03-08

## 2019-03-07 RX ADMIN — PIPERACILLIN AND TAZOBACTAM 3.38 G: 3; .375 INJECTION, POWDER, LYOPHILIZED, FOR SOLUTION INTRAVENOUS; PARENTERAL at 05:47

## 2019-03-07 RX ADMIN — FENTANYL CITRATE 50 MCG: 50 INJECTION, SOLUTION INTRAMUSCULAR; INTRAVENOUS at 17:03

## 2019-03-07 RX ADMIN — HYDROMORPHONE HYDROCHLORIDE 0.5 MG: 1 INJECTION, SOLUTION INTRAMUSCULAR; INTRAVENOUS; SUBCUTANEOUS at 03:42

## 2019-03-07 RX ADMIN — MORPHINE SULFATE 4 MG: 4 INJECTION INTRAVENOUS at 08:57

## 2019-03-07 RX ADMIN — PIPERACILLIN AND TAZOBACTAM 3.38 G: 3; .375 INJECTION, POWDER, LYOPHILIZED, FOR SOLUTION INTRAVENOUS; PARENTERAL at 21:37

## 2019-03-07 RX ADMIN — OXYCODONE HYDROCHLORIDE 10 MG: 10 TABLET ORAL at 18:43

## 2019-03-07 RX ADMIN — MIDAZOLAM 2 MG: 1 INJECTION INTRAMUSCULAR; INTRAVENOUS at 17:02

## 2019-03-07 RX ADMIN — PIPERACILLIN AND TAZOBACTAM 3.38 G: 3; .375 INJECTION, POWDER, LYOPHILIZED, FOR SOLUTION INTRAVENOUS; PARENTERAL at 13:06

## 2019-03-07 RX ADMIN — MORPHINE SULFATE 4 MG: 4 INJECTION INTRAVENOUS at 11:17

## 2019-03-07 RX ADMIN — HYDROMORPHONE HYDROCHLORIDE 0.5 MG: 1 INJECTION, SOLUTION INTRAMUSCULAR; INTRAVENOUS; SUBCUTANEOUS at 01:02

## 2019-03-07 RX ADMIN — MIDAZOLAM HYDROCHLORIDE 2 MG: 1 INJECTION INTRAMUSCULAR; INTRAVENOUS at 17:02

## 2019-03-07 RX ADMIN — MORPHINE SULFATE 4 MG: 4 INJECTION INTRAVENOUS at 13:06

## 2019-03-07 RX ADMIN — LIDOCAINE HYDROCHLORIDE: 10 INJECTION, SOLUTION INFILTRATION; PERINEURAL at 17:04

## 2019-03-07 RX ADMIN — HYDROMORPHONE HYDROCHLORIDE 0.5 MG: 1 INJECTION, SOLUTION INTRAMUSCULAR; INTRAVENOUS; SUBCUTANEOUS at 05:46

## 2019-03-07 RX ADMIN — MORPHINE SULFATE 4 MG: 4 INJECTION INTRAVENOUS at 15:30

## 2019-03-07 RX ADMIN — OXYCODONE HYDROCHLORIDE 10 MG: 10 TABLET ORAL at 23:18

## 2019-03-07 ASSESSMENT — COGNITIVE AND FUNCTIONAL STATUS - GENERAL
MOBILITY SCORE: 24
SUGGESTED CMS G CODE MODIFIER DAILY ACTIVITY: CH
DAILY ACTIVITIY SCORE: 24
SUGGESTED CMS G CODE MODIFIER MOBILITY: CH

## 2019-03-07 ASSESSMENT — PATIENT HEALTH QUESTIONNAIRE - PHQ9
SUM OF ALL RESPONSES TO PHQ9 QUESTIONS 1 AND 2: 0
2. FEELING DOWN, DEPRESSED, IRRITABLE, OR HOPELESS: NOT AT ALL
1. LITTLE INTEREST OR PLEASURE IN DOING THINGS: NOT AT ALL

## 2019-03-07 NOTE — PROGRESS NOTES
Assumed care of pt.  AAOx4.  Rating pain at 10/10, medicated per MAR.  Old MLI with steri strips, bottom of MLI with dressing pt has been caring for at home. Wound consult ordered for evaluation and dressing change orders.  LLQ ostomy with no output this morning, pt states that he has been changing bag himself.  NPO at this time for IR procedure today.  + void.  POC reviewed with pt.  Call light within reach, pt educated to call for assistance as needed.  Hourly rounding in place.

## 2019-03-07 NOTE — CARE PLAN
Problem: Communication  Goal: The ability to communicate needs accurately and effectively will improve  Outcome: PROGRESSING AS EXPECTED  POC reviewed with pt. All questions answered at this time. Plan for IR procedure today.     Problem: Pain Management  Goal: Pain level will decrease to patient's comfort goal  Outcome: PROGRESSING AS EXPECTED  Pain well controlled at this time. Pt educated on available regimen and to call for intervention as needed.

## 2019-03-07 NOTE — PROGRESS NOTES
PT readmitted due to inc abd pain, malaise  CT shows multiple intra-abdominal abscesses  Readmitted   IV abx  Plan IR drain today

## 2019-03-07 NOTE — DOCUMENTATION QUERY
"                                                                         [facility]                                                                         Query Response Note      PATIENT:               FAWN GOMEZ  ACCT #:                  [id number]  MRN:                       [id number]  :                       1960  ADMIT DATE:       2019 12:22 PM  DISCH DATE:        2019 2:52 PM  RESPONDING  PROVIDER #:        [id number]           RESPONSE TEXT:    Sigmoid colon    QUERY TEXT:    Clarification of Clinical Diagnostic Findings    Please clarify the portion of colon used for the colostomy.  ICD-10 requires considerable detail for the correct assignment of procedure codes.  It is unclear from the op report.    Colostomy performed using:    The patient's Clinical Indicators include:  Per path report 16.5 cm segment of Sigmoid colon submitted.    Per op report:  The bowel was transected proximally and distally to the inflamed segment of the sigmoid colon...Becaues of the etensive infection, it was elected to do an end colostomy...\"  Query created by: Ruby Arroyo on 3/4/2019 11:56 AM        Electronically signed by:  ELOISE THOMPSON MD 3/7/2019 9:53 AM         "

## 2019-03-07 NOTE — ED NOTES
Report received.  Assumed care.  Pt assessed, A&O x 3.  Educated on fall precautions and pt verbalizes understanding.  Call light and monitor within reach, bed in lowest position.  Pt drinking oral contrast in prep for CT of abdomen.  Understands POC.  No questions from him or family at this time.

## 2019-03-07 NOTE — PROGRESS NOTES
Assessment complete.  A&O x 4. Patient calls appropriately.  Patient up self.  Patient has 1/10 pain, previously medicated in ED.   Denies N&V. Pt on Strict NPO diet.  Old midline is healing, with steri-strips still in place. Bottom is dehisced and packed, with 2 staples in place.  + void, + flatus, + BM.  Patient denies SOB.  Patient placed on waffle cushion and in bed resting.  Review plan with of care with patient. Call light and personal belongings with in reach. Hourly rounding in place. All needs met at this time.

## 2019-03-07 NOTE — OR SURGEON
Immediate Post- Operative Note        PostOp Diagnosis: PELVIC ABSCESS      Procedure(s): CT hematoma DRAINAGE      Estimated Blood Loss: Less than 5 ml        Complications: None            3/7/2019     3:47 PM     Akhil Noonan

## 2019-03-07 NOTE — ED NOTES
Med rec updated and complete.  Allergies reviewed.  Pt is not currently taking any medications . Pt finished  A 5 day course of Augmentin on 03/02/19.  All medications remain on med rec.

## 2019-03-07 NOTE — CARE PLAN
Problem: Safety  Goal: Will remain free from injury  Outcome: PROGRESSING AS EXPECTED  Bed locked and in lowest position. Non-slid slippers in place.  Call light and belongings are within reach.     Problem: Pain Management  Goal: Pain level will decrease to patient's comfort goal  Outcome: PROGRESSING SLOWER THAN EXPECTED  Will encourage pt to ambulate, Will medicate per MAR, and will provide non-pharmacologic pain relief measures.

## 2019-03-07 NOTE — WOUND TEAM
"[facility]  Inpatient Services  Initial Wound and Skin Care Evaluation    Admission Date: 3/6/2019     Consult Date:  3/7/19   HPI, PMH, SH: Reviewed    Unit where seen by Wound Team: [id number]     WOUND CONSULT RELATED TO:  Evaluation of abdominal incisional wound and ostomy     SUBJECTIVE:  \"I went to the wound clinic and they put this one on\"      Self Report / Pain Level:  Denies related to wound       OBJECTIVE:  Clean appearing wound that patient plans on caring for; ostomy appliance intact and patient is independent with this; pouches given as patient refuses to empty pouches    WOUND TYPE, LOCATION, CHARACTERISTICS (Pressure Injuries: location, stage, POA or date identified)     Incision 02/18/19 Abdomen (Active)   Wound Image       3/7/2019 11:05 AM   Site Assessment Clean;Intact;Red 3/7/2019 11:05 AM   Tosha-wound Assessment Clean;Dry;Intact 3/7/2019 11:05 AM   Margins Attached edges 3/7/2019 11:05 AM   Wound Length (cm) 2.5 cm 3/7/2019 11:05 AM   Wound Width (cm) 1 cm 3/7/2019 11:05 AM   Wound Depth (cm) 0.8 cm 3/7/2019 11:05 AM   Wound Surface Area (cm^2) 2.5 cm^2 3/7/2019 11:05 AM   Tunneling 0 cm 3/7/2019 11:05 AM   Undermining 0 cm 3/7/2019 11:05 AM   Closure Secondary intention 3/7/2019 11:05 AM   Drainage Amount Scant 3/7/2019 11:05 AM   Drainage Description Serosanguineous 3/7/2019 11:05 AM   Treatments Cleansed;Site care 3/7/2019 11:05 AM   Periwound Protectant Not Applicable 3/7/2019 11:05 AM   Dressing Options Adhesive Foam;Hydrofiber Silver 3/7/2019 11:05 AM   Dressing Changed Changed 3/7/2019 11:05 AM   Dressing Status Intact 3/7/2019 11:05 AM   Dressing Change Frequency Every 72 hrs 3/7/2019 11:05 AM   NEXT Dressing Change  03/10/19 3/7/2019 11:05 AM   NEXT Weekly Photo (Inpatient Only) 03/14/19 3/7/2019 11:05 AM   Staples Removed Intact Yes 3/7/2019 11:05 AM   Number of Staples Removed 3 3/7/2019 11:05 AM   WOUND NURSE ONLY - Odor None 3/7/2019 11:05 AM   WOUND NURSE ONLY - " Exposed Structures None 3/7/2019 11:05 AM   WOUND NURSE ONLY - Tissue Type and Percentage red 100% 3/7/2019 11:05 AM   WOUND NURSE ONLY - Time Spent with Patient (mins) 45 3/7/2019 11:05 AM     Lab Values:    WBC:       WBC   Date/Time Value Ref Range Status   03/06/2019 04:00 PM 12.2 (H) 4.8 - 10.8 K/uL Final     AIC:    No results found for: HBA1C      Culture:   NA    INTERVENTIONS BY WOUND TEAM:  Met with patient who is known to me from previous admission.  He is independent with ostomy care and appliance is intact with red stoma per pouch.  He is aware that he will be purchasing pouches as he refuses to empty pouches due to odor and mess.  Removed old dressing and cleansed wound with NS gauze.  Steri strips intact in various stages of peeling off and 3 staples noted.  These were removed with Dr. Napoles's OK without incident.  Measurements and photo taken and covered wound with silver hydrofiber and secured with small adhesive foam dressing.  Instructed on use of dressing with every 3 day change.    Dressing selection:  Silver hydrofiber, adhesive foam         Interdisciplinary consultation: Patient, Bedside RN, Dr. Napoles    EVALUATION: clean appearing wound that should resolve with above wound care that patient can provide    Factors affecting wound healing: resolving infection  Goals: Steady decrease in wound area and depth weekly.    NURSING PLAN OF CARE ORDERS (X):    Dressing changes: See Dressing Care orders: X  Skin care: See Skin Care orders:   Rectal tube care: See Rectal Tube Care orders:   Other orders:    RSKIN: CURRENT (X) ORDERED (O):   Q shift Scott:  X  Q shift pressure point assessments:  X  Pressure redistribution mattress  X          CARIDAD          Bariatric CARIDAD         Bariatric foam           Heel float boots          Float Heels off Bed with Pillows      Independent with bed mobility         Barrier wipes         Barrier Cream         Barrier paste          Sacral silicone dressing          Silicone O2 tubing         Anchorfast         Cannula fixation Device (Tender )          Gray Foam Ear protectors           Trach with Optifoam split foam                 Waffle cushion        Waffle Overlay         Rectal tube or BMS         Antifungal tx      Interdry          Reposition q 2 hours    See above    Up to chair        Ambulate    X  PT/OT        Dietician        Diabetes Education      PO     TF     TPN     NPO X  # days < 1  Other        WOUND TEAM PLAN OF CARE (X):   NPWT change 3 x week:        Dressing changes by wound team:       Follow up as needed:   X PRN concerns  Other (explain):     Anticipated discharge plans (X):   SNF:           Home Care:           Outpatient Wound Center:            Self Care:    X        Other:

## 2019-03-07 NOTE — ED PROVIDER NOTES
ED Provider Note    Scribed for Yohan Chavez M.D. by Salvador Bustos. 3/6/2019  5:31 PM    Primary care provider: Pcp Pt States None  Means of arrival: Private Vehicle  History obtained from: Patient  History limited by: None.    CHIEF COMPLAINT  Chief Complaint   Patient presents with   • Weakness   • Abdominal Cramping   • Chest Pain   • Shortness of Breath     HPI  Marbin Ladd is a 58 y.o. male who presents to the Emergency Department complaining of worsening weakness and fatigue over the past two days.  He has associated abdominal pain, chest pain, and shortness of breath.  The patient states he has been in pain since he discontinued his pain medications 3 days ago that were prescribed after his colectomy. The patient was discharged 8 days ago and he is concerned regarding his persistent pain.  He has hot and cold flashes and a fever that was measured at 101 4 days ago.  His temperature has not been measured in the past 2 days.  The patient is eating and drinking normally and has had increased colostomy output.  He completed his antibiotics and his last dose was 4 days ago.  The patient did have epistaxis for 3 days in a row and after using a humidifier this stopped.  Patient denies any cough or dysuria.  He additionally complains of right ear pain    REVIEW OF SYSTEMS  Pertinent positives include weakness, fatigue, abdominal pain, chest pain, shortness of breath, fever, right ear pain. Pertinent negatives include no cough or dysuria.  All other systems reviewed and negative.    PAST MEDICAL HISTORY   has a past medical history of Gout.    SURGICAL HISTORY   has a past surgical history that includes exploratory laparotomy (2/18/2019).    SOCIAL HISTORY  Social History   Substance Use Topics   • Smoking status: Never Smoker   • Smokeless tobacco: Never Used   • Alcohol use Yes      Comment: rare      History   Drug Use   • Types: Inhaled     Comment: thc rare     FAMILY HISTORY  History reviewed. No  "pertinent family history.    CURRENT MEDICATIONS  Home Medications     Reviewed by Jamar Mccormack (Pharmacy Tech) on 03/06/19 at 2042  Med List Status: Complete   Medication Last Dose Status   allopurinol (ZYLOPRIM) 100 MG Tab 3/1/2019 Active   amoxicillin-clavulanate (AUGMENTIN) 875-125 MG Tab 3/2/2019 Active   indomethacin (INDOCIN) 50 MG Cap 3/1/2019 Active              ALLERGIES  Allergies   Allergen Reactions   • Vicodin [Hydrocodone-Acetaminophen]      Constipation, pt denies true allergy \"I just dont like taking pain medication\"     PHYSICAL EXAM  VITAL SIGNS: /59   Pulse 82   Temp 36.9 °C (98.4 °F) (Temporal)   Resp 16   Ht 1.829 m (6')   Wt 70.1 kg (154 lb 8.7 oz)   SpO2 98%   BMI 20.96 kg/m²     Constitutional: Well developed, Well nourished, moderate distress, Non-toxic appearance.   HENT: Normocephalic, Atraumatic, Bilateral external ears normal, dry mucous membranes, No oral exudates.   Eyes: PERRLA, EOMI, Conjunctiva normal, No discharge.   Neck: No tenderness, Supple, No stridor.   Lymphatic: No lymphadenopathy noted.   Cardiovascular: Normal heart rate, Normal rhythm.   Thorax & Lungs: Clear to auscultation bilaterally, No respiratory distress, No wheezing, No crackles.   Abdomen: Steri strips in place, colostomy bag on left side, moderate diffuse tenderness that is more pronounced on the periumbilical and left side.  Skin: Warm, Dry, No erythema, No rash.   Extremities:, No edema No cyanosis.   Musculoskeletal: No tenderness to palpation or major deformities noted.  Intact distal pulses  Neurologic: Awake, alert. Moves all extremities spontaneously.  Psychiatric: Affect normal, Judgment normal, Mood normal.     LABS  Labs Reviewed   CBC WITH DIFFERENTIAL - Abnormal; Notable for the following:        Result Value    WBC 12.2 (*)     RBC 3.78 (*)     Hemoglobin 10.5 (*)     Hematocrit 32.9 (*)     MCHC 31.9 (*)     RDW 50.7 (*)     Platelet Count 774 (*)     Neutrophils-Polys 78.80 (*) " "    Lymphocytes 13.80 (*)     Immature Granulocytes 1.20 (*)     Neutrophils (Absolute) 9.59 (*)     Immature Granulocytes (abs) 0.15 (*)     All other components within normal limits    Narrative:     Indicate which anticoagulants the patient is on:->UNKNOWN   COMP METABOLIC PANEL - Abnormal; Notable for the following:     Co2 18 (*)     Anion Gap 13.0 (*)     Glucose 112 (*)     Alkaline Phosphatase 318 (*)     Globulin 4.1 (*)     All other components within normal limits    Narrative:     Indicate which anticoagulants the patient is on:->UNKNOWN   LIPASE - Abnormal; Notable for the following:     Lipase 8 (*)     All other components within normal limits    Narrative:     Indicate which anticoagulants the patient is on:->UNKNOWN   PROTHROMBIN TIME - Abnormal; Notable for the following:     PT 16.3 (*)     INR 1.30 (*)     All other components within normal limits    Narrative:     Indicate which anticoagulants the patient is on:->UNKNOWN   APTT - Abnormal; Notable for the following:     APTT 41.7 (*)     All other components within normal limits    Narrative:     Indicate which anticoagulants the patient is on:->UNKNOWN   TROPONIN    Narrative:     Indicate which anticoagulants the patient is on:->UNKNOWN   BTYPE NATRIURETIC PEPTIDE    Narrative:     Indicate which anticoagulants the patient is on:->UNKNOWN   ESTIMATED GFR    Narrative:     Indicate which anticoagulants the patient is on:->UNKNOWN   LACTIC ACID   URINALYSIS    Narrative:     Indication for culture:->Emergency Room Patient   URINE CULTURE(NEW)    Narrative:     Indication for culture:->Emergency Room Patient   BLOOD CULTURE    Narrative:     Per Hospital Policy: Only change Specimen Src: to \"Line\" if  specified by physician order.   BLOOD CULTURE    Narrative:     Per Hospital Policy: Only change Specimen Src: to \"Line\" if  specified by physician order.   INFLUENZA A/B BY PCR    Narrative:     Indication for culture:->Emergency Room Patient "   URINALYSIS,CULTURE IF INDICATED   LACTIC ACID     All labs reviewed by me.    EKG  Results for orders placed or performed during the hospital encounter of 19   EKG   Result Value Ref Range    Report       [facility] Emergency Dept.    Test Date:  2019  Pt Name:    FAWN GOMEZ                  Department: ER  MRN:        [id number]                      Room:       [facility]  Gender:     Male                         Technician: [id number]  :        1960                   Requested By:ER TRIAGE PROTOCOL  Order #:    [id number]                    Reading MD:    Measurements  Intervals                                Axis  Rate:       87                           P:          0  NM:         180                          QRS:        145  QRSD:       86                           T:          138  QT:         368  QTc:        443    Interpretive Statements  RIGHT AND LEFT ARM LEADS REVERSED, PLEASE REPEAT ECG  LEAD(S) II WERE NOT USED FOR MORPHOLOGY ANALYSIS  No previous ECG available for comparison          RADIOLOGY  CT-ABDOMEN-PELVIS WITH   Final Result         1.  Right upper quadrant abscess. The appendix abuts this right upper quadrant fluid collection, ruptured appendicitis with abscess formation cannot be radiographically excluded.   2.  Multiloculated lower abdominal and pelvis abscess. Pelvic abscess abuts rectal stump, could correspond with staple line leak.   3.  Fluid-filled small bowel loops with reactive mucosal pattern suggests component of ileus and/or enteritis.   4.  Atherosclerosis      DX-CHEST-LIMITED (1 VIEW)   Final Result      Linear atelectasis within the right lung base.        The radiologist's interpretation of all radiological studies have been reviewed by me.    COURSE & MEDICAL DECISION MAKING  Pertinent Labs & Imaging studies reviewed. (See chart for details)    I reviewed the patient's medical records which showed the patient had a surgery for perforated  diverticulitis on the 18th with a colostomy.    3:00 PM- Ordered APTT, Prothrombin time, BNP, Troponin, Urinalysis, culture if indicate,d Lipase, CMP, CBC with differential, and Estimated GFR.    5:31 PM - Patient seen and examined at bedside. Patient will be treated with 1L normal saline bolus, 4 mg Zofran, and 0.5 mg dilaudid. Ordered Chest Xray, Lactic acid, Influenza by PCR, Lactic acid, urinalysis, urine culture, blood culture x2 to evaluate his symptoms. The differential diagnoses include but are not limited to: Intra-abdominal infection, sepsis, obstruction, viral.    6:26 PM Informed family on NPO status.    6:40 PM Ordered CT - Abdomen/Pelvis.    8:26 PM I discussed the patient's case and the above findings with Dr. Napoles (general surgery) who asked me to place holding orders for the patient.  Ordered 3.375 mg Zosyn.       8:32 PM Patient was reevaluated at bedside. Discussed lab and radiology results with the patient and informed them that he has abdominal abscesses and will need to be admitted to the hospital for IV antibiotics.  Patient agrees with this plan.        8:42 PM Updated the patient's family on the plan of care.      Decision Making:  Patient is coming in secondary to abdominal pain, generalized malaise and fatigue, ordered sepsis protocol.  The patient has leukocytosis, CT scan shows multiple abscesses, discussed the case with Dr. Camp, give the patient IV antibiotics, will place holding orders for the patient, patient will be admitted to Dr. Napoles.    HYDRATION: Based on the patient's presentation of Acute Vomiting the patient was given IV fluids. IV Hydration was used because oral hydration was not adequate alone. Upon recheck following hydration, the patient was Improved.    DISPOSITION:  Patient will be admitted to [facility] in guarded condition.     FINAL IMPRESSION  1. Postprocedural intraabdominal abscess       I, Salvador Bustos (Scribe), am scribing for, and in the presence of, Yohan  SHIRA Chavez.    Electronically signed by: Salvador Bustos (Scribe), 3/6/2019    I, Yohan Chavez M.D. personally performed the services described in this documentation, as scribed by Salvador Bustos in my presence, and it is both accurate and complete.  C.      The note accurately reflects work and decisions made by me.  Yohan Chavez  3/6/2019  9:48 PM

## 2019-03-07 NOTE — PROGRESS NOTES
"2 RN skin check.    Old midline is healing, with steri-strips still in place. Bottom is dehisced and packed, with 2 staples in place.  Pt had large lump on left forearm/elbow, pt states \"this is from my gout.\"   Sacrum is very red,. Pt placed on waffle mattress. Mepilex currently refused.  All other skin is intact.  "

## 2019-03-07 NOTE — ED NOTES
Urine specimen sent.  Pt able to reposition himself with min assist.  Requesting pain meds for 7/10 abd pain.  Provided per MAR order.

## 2019-03-07 NOTE — ED NOTES
Provided telephone report to [facility] RN.  Pt transported to unit with all belongings and family with him.

## 2019-03-07 NOTE — H&P
DATE OF ADMISSION:  03/06/2019    IDENTIFICATION:  This is a 58-year-old male.    HISTORY OF PRESENT ILLNESS:  The patient was in his usual state of health   until 02/18, when he came to the emergency room and was found to have   perforated diverticulitis with multiple intraabdominal abscesses.  He was   taken emergently to the operating room and underwent exploration, sigmoid   colectomy, colostomy, and washout of abscesses.  He was hospitalized and   there, he did have elevated white count and fever during this hospitalization.    He had a CT scan performed prior to discharge to rule out an abscess, which   was done on 02/23, which was negative.  He discharged on oral antibiotics.  He   presented back to the emergency room today with increasing malaise and not   feeling well with abdominal cramping and increased output from his ostomy.  CT   scan now demonstrates him to have multiple intraabdominal abscesses.  I have   been asked to see him in regards to this.    PAST MEDICAL HISTORY/ILLNESSES:  Perforated diverticulitis and gout.    PAST SURGICAL HISTORY:  Exploratory celiotomy, sigmoid colectomy and colostomy   performed on 02/18.    MEDICATIONS:  Currently are allopurinol, Augmentin, and Indocin.    ALLERGIES:  VICODIN.    SOCIAL HISTORY:  He does not smoke.  He does drink occasionally.    REVIEW OF SYSTEMS:  Otherwise unremarkable.    PHYSICAL EXAMINATION:  GENERAL:  He is afebrile.  HEENT:  He is anicteric.  NECK:  Supple.  ABDOMEN:  Soft with a well healing midline incision.  His ostomy on left side   is functioning.  He has no palpable masses.  EXTREMITIES:  Without edema.  NEUROLOGIC:  Intact.    LABORATORY DATA:  His white count is 12,000.  CT scan shows right upper   quadrant abscess with multiple loculated lower abdominal and pelvic abscesses   next to the rectal stump.    IMPRESSION:  A 58-year-old male, status post perforated diverticulitis with   now intra-abdominal abscesses.  Plan will be  admission.  He will be placed on   IV antibiotics and we will have IR try to drain these percutaneously.       ____________________________________     MD PRITI WRIGHT / BORIS    DD:  03/07/2019 10:38:55  DT:  03/07/2019 10:58:29    D#:  [id number]  Job#:  [id number]

## 2019-03-08 PROBLEM — T81.43XA POSTOPERATIVE INTRA-ABDOMINAL ABSCESS: Status: ACTIVE | Noted: 2019-03-08

## 2019-03-08 PROBLEM — K65.1 POSTOPERATIVE INTRA-ABDOMINAL ABSCESS (HCC): Status: ACTIVE | Noted: 2019-03-08

## 2019-03-08 LAB
GRAM STN SPEC: NORMAL
SIGNIFICANT IND 70042: NORMAL
SITE SITE: NORMAL
SOURCE SOURCE: NORMAL

## 2019-03-08 PROCEDURE — A9270 NON-COVERED ITEM OR SERVICE: HCPCS | Performed by: RADIOLOGY

## 2019-03-08 PROCEDURE — A9270 NON-COVERED ITEM OR SERVICE: HCPCS | Performed by: NURSE PRACTITIONER

## 2019-03-08 PROCEDURE — 700105 HCHG RX REV CODE 258

## 2019-03-08 PROCEDURE — 700111 HCHG RX REV CODE 636 W/ 250 OVERRIDE (IP): Performed by: EMERGENCY MEDICINE

## 2019-03-08 PROCEDURE — 700105 HCHG RX REV CODE 258: Performed by: EMERGENCY MEDICINE

## 2019-03-08 PROCEDURE — 700102 HCHG RX REV CODE 250 W/ 637 OVERRIDE(OP): Performed by: NURSE PRACTITIONER

## 2019-03-08 PROCEDURE — 700111 HCHG RX REV CODE 636 W/ 250 OVERRIDE (IP): Performed by: NURSE PRACTITIONER

## 2019-03-08 PROCEDURE — 770006 HCHG ROOM/CARE - MED/SURG/GYN SEMI*

## 2019-03-08 PROCEDURE — 700102 HCHG RX REV CODE 250 W/ 637 OVERRIDE(OP): Performed by: RADIOLOGY

## 2019-03-08 PROCEDURE — 700105 HCHG RX REV CODE 258: Performed by: SURGERY

## 2019-03-08 RX ORDER — SODIUM CHLORIDE 9 MG/ML
INJECTION, SOLUTION INTRAVENOUS
Status: COMPLETED
Start: 2019-03-08 | End: 2019-03-08

## 2019-03-08 RX ORDER — KETOROLAC TROMETHAMINE 30 MG/ML
30 INJECTION, SOLUTION INTRAMUSCULAR; INTRAVENOUS EVERY 6 HOURS
Status: DISCONTINUED | OUTPATIENT
Start: 2019-03-08 | End: 2019-03-10 | Stop reason: HOSPADM

## 2019-03-08 RX ORDER — OXYCODONE HYDROCHLORIDE 5 MG/1
5-10 TABLET ORAL EVERY 4 HOURS PRN
Status: DISCONTINUED | OUTPATIENT
Start: 2019-03-08 | End: 2019-03-10 | Stop reason: HOSPADM

## 2019-03-08 RX ADMIN — OXYCODONE HYDROCHLORIDE 10 MG: 10 TABLET ORAL at 04:18

## 2019-03-08 RX ADMIN — OXYCODONE HYDROCHLORIDE 10 MG: 10 TABLET ORAL at 11:32

## 2019-03-08 RX ADMIN — PIPERACILLIN AND TAZOBACTAM 3.38 G: 3; .375 INJECTION, POWDER, LYOPHILIZED, FOR SOLUTION INTRAVENOUS; PARENTERAL at 05:13

## 2019-03-08 RX ADMIN — KETOROLAC TROMETHAMINE 30 MG: 30 INJECTION, SOLUTION INTRAMUSCULAR; INTRAVENOUS at 18:13

## 2019-03-08 RX ADMIN — OXYCODONE HYDROCHLORIDE 10 MG: 10 TABLET ORAL at 16:18

## 2019-03-08 RX ADMIN — SODIUM CHLORIDE 500 ML: 9 INJECTION, SOLUTION INTRAVENOUS at 05:14

## 2019-03-08 RX ADMIN — PIPERACILLIN AND TAZOBACTAM 3.38 G: 3; .375 INJECTION, POWDER, LYOPHILIZED, FOR SOLUTION INTRAVENOUS; PARENTERAL at 22:25

## 2019-03-08 RX ADMIN — PIPERACILLIN AND TAZOBACTAM 3.38 G: 3; .375 INJECTION, POWDER, LYOPHILIZED, FOR SOLUTION INTRAVENOUS; PARENTERAL at 13:20

## 2019-03-08 RX ADMIN — OXYCODONE HYDROCHLORIDE 10 MG: 5 TABLET ORAL at 22:23

## 2019-03-08 RX ADMIN — SODIUM CHLORIDE: 9 INJECTION, SOLUTION INTRAVENOUS at 04:21

## 2019-03-08 RX ADMIN — OXYCODONE HYDROCHLORIDE 10 MG: 10 TABLET ORAL at 07:59

## 2019-03-08 RX ADMIN — SODIUM CHLORIDE: 9 INJECTION, SOLUTION INTRAVENOUS at 16:19

## 2019-03-08 NOTE — PROGRESS NOTES
Bedside report received.  Assessment complete.  A&O x 4. Patient calls appropriately.  Patient up with standby assist to self.  Patient has 4/10 pain. Medicated by day shift.  Denies N&V. Pt started on Regular diet.  Surgical IR drain placed today, to LLQ.  + void, - flatus, - BM.  Patient denies SOB.  SCD's on.  Patient pleasant with staff and resting with daughter at bedside.  Review plan with of care with patient. Call light and personal belongings with in reach. Hourly rounding in place. All needs met at this time.

## 2019-03-08 NOTE — CARE PLAN
Problem: Safety  Goal: Will remain free from injury  Outcome: PROGRESSING AS EXPECTED  Bed locked and in lowest position. Non-slid slippers in place.  Call light and belongings are within reach.       Problem: Pain Management  Goal: Pain level will decrease to patient's comfort goal  Outcome: PROGRESSING AS EXPECTED  Will encourage pt to ambulate, Will medicate per MAR, and will provide non-pharmacologic pain relief measures.

## 2019-03-08 NOTE — PROGRESS NOTES
A&Ox4. Calls appropriately.   Pt states pain 7/10 LLQ. Medicated per MAR.   Surgical IR drain LLQ in place to bulb suction. Mild serosanguineous drainage.  LLQ ostomy + output. CDI.   Past admission midline incision healing at top; adhesive dressing covering bottom d/t reported dehisce. Dressing CDI.   Tolerating regular diet, denies n/v. + bs, +flatus, LBM 3/7 from ostomy.   Saturating >90% on RA.  Pt ambulates SBA.   Abx running with  ml/hr.   Updated on plan of care. Safety education provided. Bed locked in low. Call light within reach. Rounding in place.

## 2019-03-08 NOTE — OR NURSING
1732 Patient arrived to unit, awake and alert.  Access site to LLQ clean, dry and soft.  Minimal sanguinous drainage to JOHAN drain.  Patient denies pain at this time.  Updated on plan of care, verbalized understanding.   1815 Report to Jena HUDDLESTON.  Patient resting in bed, awake and denies discomfort.  Drain site clean, dry and soft, minimal sanguinous drainage to JOHAN drain.  1830 Patient transferred to [place] [id number] via bed with transport.

## 2019-03-08 NOTE — PROGRESS NOTES
Pt back to unit from IR procedure.  LLQ IR drain with serosanguinous drainage noted.  Pt c/o pain, medicated per MAR.   Ice applied to area.

## 2019-03-08 NOTE — PROGRESS NOTES
Surgical Progress Note:    POD #1 S/P IR drain placement  Doing well. Neg N/V, no ostomy output, Pain controlled, Denies chest pain or SOB.  Ambulating. Tolerating PO. IR drain with serous drainage    PE:  /69   Pulse 71   Temp 36.3 °C (97.4 °F) (Temporal)   Resp 16   Ht 1.829 m (6')   Wt 70.1 kg (154 lb 8.7 oz)   SpO2 95%   BMI 20.96 kg/m²     I/O:   Intake/Output Summary (Last 24 hours) at 03/08/19 0725  Last data filed at 03/08/19 0400   Gross per 24 hour   Intake             3000 ml   Output             1225 ml   Net             1775 ml         Review of Systems   Constitutional: Negative.  Negative for chills and fever.   Respiratory: Negative for shortness of breath.    Cardiovascular: Negative for chest pain.   Gastrointestinal: Negative for nausea and vomiting.        noflatus/no stool   Genitourinary: Negative.      Physical Exam   Constitutional:  appears well-developed.   Neck: Neck supple.   Cardiovascular: Normal rate.    Pulmonary/Chest: Effort normal.   Abdominal: Soft.  exhibits no distension. Appropriate tenderness.   Incisions clean, dry, and intact   drain with serous drinage  Musculoskeletal: Normal range of motion.   Neurological:  alert.   Skin:  Warm and dry.   Extremities: najera, no edema    Labs:  Recent Labs      03/06/19   1600   WBC  12.2*   RBC  3.78*   HEMOGLOBIN  10.5*   HEMATOCRIT  32.9*   MCV  87.0   MCH  27.8   RDW  50.7*   PLATELETCT  774*   MPV  9.2   NEUTSPOLYS  78.80*   LYMPHOCYTES  13.80*   MONOCYTES  5.10   EOSINOPHILS  0.40   BASOPHILS  0.70     Recent Labs      03/06/19   1546   SODIUM  135   POTASSIUM  4.1   CHLORIDE  104   CO2  18*   GLUCOSE  112*   BUN  15         A/P:     - Regular low residue diet as tolerated.  - IS Q One hour while awake  - Ambulate.  Out of bed for all meals please  - Pain controlled.  Cont PO pain medication  - Labs noted, recheck in am  - DVT propholaxis, ok to start Lovenox, sequentials and ambulate  - Adequate urine output.  Cont, to  monitor  - Zosyn, cont  Await cultures  - saline lock IV in between antibiotics  - IF continues to do well and tolerates PO, will plan for D/C with final culture results    Postoperative intra-abdominal abscess- (present on admission)   Assessment & Plan    3/7 IR drain placement, cultures         Discussed with Patient, RN and TRAVIS Alas  [facility]  [phone]

## 2019-03-08 NOTE — CARE PLAN
Problem: Safety  Goal: Will remain free from injury  Outcome: PROGRESSING AS EXPECTED  Patient free from accidental injury at this time. Safety precautions in place. Hourly rounding in pace.     Problem: Pain Management  Goal: Pain level will decrease to patient's comfort goal  Outcome: PROGRESSING AS EXPECTED  Patient experiencing pain relief with MAR medication. Patient encouraged to call if pain worsens. Hourly rounding in place.

## 2019-03-08 NOTE — PROGRESS NOTES
CT Procedure Note:    Dr. KNIGHT consented patient prior to procedure; all questions answered.    Site confirmed with CT imaging by patient, physician, RT, and RN.     Dr. KNIGHT completed PERITONEAL DRAIN PLACEMENT .  The patient tolerated the procedure well; ETCo2 baseline 30S, with consistent waveform during the procedure.      PERCUSTAY applied to LEFT LOWER ABD, CDI and soft; pressure held x 0 minutes.  Patient alert and verbally appropriate post procedure, vital signs stable during procedure and transport, see flow sheet for vital signs.  Report given to FLAQUITO KING.  RN transported patient to PPU with Sao2 monitor = 92% on oxygen via NC @ 0 lpm.     Sedation End Time: 6665

## 2019-03-09 LAB
ANION GAP SERPL CALC-SCNC: 6 MMOL/L (ref 0–11.9)
BACTERIA UR CULT: NORMAL
BACTERIA WND AEROBE CULT: ABNORMAL
BACTERIA WND AEROBE CULT: ABNORMAL
BASOPHILS # BLD AUTO: 1.2 % (ref 0–1.8)
BASOPHILS # BLD: 0.09 K/UL (ref 0–0.12)
BUN SERPL-MCNC: 8 MG/DL (ref 8–22)
CALCIUM SERPL-MCNC: 8.9 MG/DL (ref 8.5–10.5)
CHLORIDE SERPL-SCNC: 107 MMOL/L (ref 96–112)
CO2 SERPL-SCNC: 24 MMOL/L (ref 20–33)
CREAT SERPL-MCNC: 0.89 MG/DL (ref 0.5–1.4)
EOSINOPHIL # BLD AUTO: 0.17 K/UL (ref 0–0.51)
EOSINOPHIL NFR BLD: 2.3 % (ref 0–6.9)
ERYTHROCYTE [DISTWIDTH] IN BLOOD BY AUTOMATED COUNT: 49.9 FL (ref 35.9–50)
GLUCOSE SERPL-MCNC: 103 MG/DL (ref 65–99)
GRAM STN SPEC: ABNORMAL
HCT VFR BLD AUTO: 26.4 % (ref 42–52)
HGB BLD-MCNC: 8.3 G/DL (ref 14–18)
IMM GRANULOCYTES # BLD AUTO: 0.05 K/UL (ref 0–0.11)
IMM GRANULOCYTES NFR BLD AUTO: 0.7 % (ref 0–0.9)
LYMPHOCYTES # BLD AUTO: 1.74 K/UL (ref 1–4.8)
LYMPHOCYTES NFR BLD: 23.5 % (ref 22–41)
MCH RBC QN AUTO: 27.2 PG (ref 27–33)
MCHC RBC AUTO-ENTMCNC: 31.4 G/DL (ref 33.7–35.3)
MCV RBC AUTO: 86.6 FL (ref 81.4–97.8)
MONOCYTES # BLD AUTO: 0.72 K/UL (ref 0–0.85)
MONOCYTES NFR BLD AUTO: 9.7 % (ref 0–13.4)
NEUTROPHILS # BLD AUTO: 4.65 K/UL (ref 1.82–7.42)
NEUTROPHILS NFR BLD: 62.6 % (ref 44–72)
NRBC # BLD AUTO: 0 K/UL
NRBC BLD-RTO: 0 /100 WBC
PLATELET # BLD AUTO: 447 K/UL (ref 164–446)
PMV BLD AUTO: 9.1 FL (ref 9–12.9)
POTASSIUM SERPL-SCNC: 4.1 MMOL/L (ref 3.6–5.5)
RBC # BLD AUTO: 3.05 M/UL (ref 4.7–6.1)
SIGNIFICANT IND 70042: ABNORMAL
SIGNIFICANT IND 70042: NORMAL
SITE SITE: ABNORMAL
SITE SITE: NORMAL
SODIUM SERPL-SCNC: 137 MMOL/L (ref 135–145)
SOURCE SOURCE: ABNORMAL
SOURCE SOURCE: NORMAL
WBC # BLD AUTO: 7.4 K/UL (ref 4.8–10.8)

## 2019-03-09 PROCEDURE — 700105 HCHG RX REV CODE 258: Performed by: EMERGENCY MEDICINE

## 2019-03-09 PROCEDURE — 770006 HCHG ROOM/CARE - MED/SURG/GYN SEMI*

## 2019-03-09 PROCEDURE — A9270 NON-COVERED ITEM OR SERVICE: HCPCS | Performed by: NURSE PRACTITIONER

## 2019-03-09 PROCEDURE — 36415 COLL VENOUS BLD VENIPUNCTURE: CPT

## 2019-03-09 PROCEDURE — 700102 HCHG RX REV CODE 250 W/ 637 OVERRIDE(OP): Performed by: NURSE PRACTITIONER

## 2019-03-09 PROCEDURE — 80048 BASIC METABOLIC PNL TOTAL CA: CPT

## 2019-03-09 PROCEDURE — 700111 HCHG RX REV CODE 636 W/ 250 OVERRIDE (IP): Performed by: EMERGENCY MEDICINE

## 2019-03-09 PROCEDURE — 85025 COMPLETE CBC W/AUTO DIFF WBC: CPT

## 2019-03-09 PROCEDURE — 700111 HCHG RX REV CODE 636 W/ 250 OVERRIDE (IP): Performed by: NURSE PRACTITIONER

## 2019-03-09 RX ORDER — AMOXICILLIN AND CLAVULANATE POTASSIUM 875; 125 MG/1; MG/1
1 TABLET, FILM COATED ORAL EVERY 12 HOURS
Status: DISCONTINUED | OUTPATIENT
Start: 2019-03-09 | End: 2019-03-10 | Stop reason: HOSPADM

## 2019-03-09 RX ADMIN — KETOROLAC TROMETHAMINE 30 MG: 30 INJECTION, SOLUTION INTRAMUSCULAR; INTRAVENOUS at 06:07

## 2019-03-09 RX ADMIN — AMOXICILLIN AND CLAVULANATE POTASSIUM 1 TABLET: 875; 125 TABLET, FILM COATED ORAL at 10:45

## 2019-03-09 RX ADMIN — OXYCODONE HYDROCHLORIDE 5 MG: 5 TABLET ORAL at 16:49

## 2019-03-09 RX ADMIN — KETOROLAC TROMETHAMINE 30 MG: 30 INJECTION, SOLUTION INTRAMUSCULAR; INTRAVENOUS at 12:31

## 2019-03-09 RX ADMIN — OXYCODONE HYDROCHLORIDE 10 MG: 5 TABLET ORAL at 06:07

## 2019-03-09 RX ADMIN — AMOXICILLIN AND CLAVULANATE POTASSIUM 1 TABLET: 875; 125 TABLET, FILM COATED ORAL at 16:49

## 2019-03-09 RX ADMIN — KETOROLAC TROMETHAMINE 30 MG: 30 INJECTION, SOLUTION INTRAMUSCULAR; INTRAVENOUS at 00:55

## 2019-03-09 RX ADMIN — KETOROLAC TROMETHAMINE 30 MG: 30 INJECTION, SOLUTION INTRAMUSCULAR; INTRAVENOUS at 16:49

## 2019-03-09 RX ADMIN — OXYCODONE HYDROCHLORIDE 10 MG: 5 TABLET ORAL at 10:45

## 2019-03-09 RX ADMIN — KETOROLAC TROMETHAMINE 30 MG: 30 INJECTION, SOLUTION INTRAMUSCULAR; INTRAVENOUS at 23:49

## 2019-03-09 RX ADMIN — OXYCODONE HYDROCHLORIDE 10 MG: 5 TABLET ORAL at 21:39

## 2019-03-09 RX ADMIN — PIPERACILLIN AND TAZOBACTAM 3.38 G: 3; .375 INJECTION, POWDER, LYOPHILIZED, FOR SOLUTION INTRAVENOUS; PARENTERAL at 06:07

## 2019-03-09 NOTE — CARE PLAN
Problem: Safety  Goal: Will remain free from falls  Outcome: PROGRESSING AS EXPECTED  Bed is locked and in lowest position.   Call light and personal belongings are within reach.     Problem: Pain Management  Goal: Pain level will decrease to patient's comfort goal  Outcome: PROGRESSING AS EXPECTED  Will encourage pt to ambulate, Will medicate per MAR, and will provide non-pharmacologic pain relief measures.

## 2019-03-09 NOTE — PROGRESS NOTES
A&Ox4.   Pt resting comfortably in bed.   Pt states pain controlled at this time. Denies interventions.   Surgical IR drain LLQ to bulb suction. Dressing CDI. Serious drainage.   Distal midline abdominal incision open, dressing in place. CDI.   Tolerating regular diet, denies n/v. + BS, - flatus, -BM.  Saturating >90% on RA.  Pt ambulates SBA; steady gate.   Abx running with NS 50ml/hr.   Family at bedside.   Pt requesting shower today.   Updated on plan of care. Safety education provided. Bed locked in low. Call light within reach. Rounding in place.

## 2019-03-09 NOTE — PROGRESS NOTES
Bedside report received.  Assessment complete.  A&O x 4. Patient calls appropriately.  Patient up with standby assist to self.  Patient has 6/10 pain. Repositioned.   Denies N&V. Pt started on Regular diet.  Surgical IR drain placed today, to LLQ.  + void, - flatus, - BM.  Patient denies SOB.  SCD's on.  Patient pleasant with staff and resting with daughter at bedside.  Review plan with of care with patient. Call light and personal belongings with in reach. Hourly rounding in place. All needs met at this time.

## 2019-03-09 NOTE — PROGRESS NOTES
Surgical Progress Note:    POD #2 S/P IR drain placement  Doing well. Neg N/V, + ostomy output, Pain controlled, Denies chest pain or SOB.  Ambulating. Tolerating PO. IR drain with serous drainage    PE:  /74   Pulse 66   Temp 36.2 °C (97.1 °F) (Temporal)   Resp 16   Ht 1.829 m (6')   Wt 70.1 kg (154 lb 8.7 oz)   SpO2 98%   BMI 20.96 kg/m²     I/O:   Intake/Output Summary (Last 24 hours) at 03/08/19 0725  Last data filed at 03/08/19 0400   Gross per 24 hour   Intake             3000 ml   Output             1225 ml   Net             1775 ml         Review of Systems   Constitutional: Negative.  Negative for chills and fever.   Respiratory: Negative for shortness of breath.    Cardiovascular: Negative for chest pain.   Gastrointestinal: Negative for nausea and vomiting.        noflatus/no stool   Genitourinary: Negative.      Physical Exam   Constitutional:  appears well-developed.   Neck: Neck supple.   Cardiovascular: Normal rate.    Pulmonary/Chest: Effort normal.   Abdominal: Soft.  exhibits no distension. Appropriate tenderness.   Incisions clean, dry, and intact   drain with serous drinage  Musculoskeletal: Normal range of motion.   Neurological:  alert.   Skin:  Warm and dry.   Extremities: najera, no edema    Labs:  Recent Labs      03/06/19   1600  03/09/19   0420   WBC  12.2*  7.4   RBC  3.78*  3.05*   HEMOGLOBIN  10.5*  8.3*   HEMATOCRIT  32.9*  26.4*   MCV  87.0  86.6   MCH  27.8  27.2   RDW  50.7*  49.9   PLATELETCT  774*  447*   MPV  9.2  9.1   NEUTSPOLYS  78.80*  62.60   LYMPHOCYTES  13.80*  23.50   MONOCYTES  5.10  9.70   EOSINOPHILS  0.40  2.30   BASOPHILS  0.70  1.20     Recent Labs      03/06/19   1546  03/09/19   0420   SODIUM  135  137   POTASSIUM  4.1  4.1   CHLORIDE  104  107   CO2  18*  24   GLUCOSE  112*  103*   BUN  15  8         A/P:     - Regular low residue diet as tolerated.  - IS Q One hour while awake  - Ambulate.  Out of bed for all meals please  - Pain controlled.  Cont PO  pain medication  - Labs noted, recheck in am  - DVT propholaxis, ok to cont Lovenox, sequentials and ambulate  - Adequate urine output.  Cont, to monitor  - change to Augmentin  - IF continues to do well and tolerates PO, will plan for D/C with final culture results    Postoperative intra-abdominal abscess- (present on admission)   Assessment & Plan    3/7 IR drain placement, cultures         Discussed with Patient, RN and Dr. Candice Lee, A.P.AUDIE.  [facility]  [phone]

## 2019-03-10 VITALS
TEMPERATURE: 97.7 F | SYSTOLIC BLOOD PRESSURE: 145 MMHG | DIASTOLIC BLOOD PRESSURE: 82 MMHG | HEART RATE: 66 BPM | WEIGHT: 154.54 LBS | HEIGHT: 72 IN | BODY MASS INDEX: 20.93 KG/M2 | RESPIRATION RATE: 16 BRPM | OXYGEN SATURATION: 98 %

## 2019-03-10 LAB
ANION GAP SERPL CALC-SCNC: 8 MMOL/L (ref 0–11.9)
BASOPHILS # BLD AUTO: 0.8 % (ref 0–1.8)
BASOPHILS # BLD: 0.05 K/UL (ref 0–0.12)
BUN SERPL-MCNC: 8 MG/DL (ref 8–22)
CALCIUM SERPL-MCNC: 9.1 MG/DL (ref 8.5–10.5)
CHLORIDE SERPL-SCNC: 105 MMOL/L (ref 96–112)
CO2 SERPL-SCNC: 23 MMOL/L (ref 20–33)
CREAT SERPL-MCNC: 0.75 MG/DL (ref 0.5–1.4)
EOSINOPHIL # BLD AUTO: 0.26 K/UL (ref 0–0.51)
EOSINOPHIL NFR BLD: 4 % (ref 0–6.9)
ERYTHROCYTE [DISTWIDTH] IN BLOOD BY AUTOMATED COUNT: 49.4 FL (ref 35.9–50)
GLUCOSE SERPL-MCNC: 90 MG/DL (ref 65–99)
HCT VFR BLD AUTO: 27.8 % (ref 42–52)
HGB BLD-MCNC: 8.9 G/DL (ref 14–18)
IMM GRANULOCYTES # BLD AUTO: 0.08 K/UL (ref 0–0.11)
IMM GRANULOCYTES NFR BLD AUTO: 1.2 % (ref 0–0.9)
LYMPHOCYTES # BLD AUTO: 1.2 K/UL (ref 1–4.8)
LYMPHOCYTES NFR BLD: 18.5 % (ref 22–41)
MCH RBC QN AUTO: 27.6 PG (ref 27–33)
MCHC RBC AUTO-ENTMCNC: 32 G/DL (ref 33.7–35.3)
MCV RBC AUTO: 86.3 FL (ref 81.4–97.8)
MONOCYTES # BLD AUTO: 0.56 K/UL (ref 0–0.85)
MONOCYTES NFR BLD AUTO: 8.7 % (ref 0–13.4)
NEUTROPHILS # BLD AUTO: 4.32 K/UL (ref 1.82–7.42)
NEUTROPHILS NFR BLD: 66.8 % (ref 44–72)
NRBC # BLD AUTO: 0 K/UL
NRBC BLD-RTO: 0 /100 WBC
PLATELET # BLD AUTO: 420 K/UL (ref 164–446)
PMV BLD AUTO: 9.5 FL (ref 9–12.9)
POTASSIUM SERPL-SCNC: 4.1 MMOL/L (ref 3.6–5.5)
RBC # BLD AUTO: 3.22 M/UL (ref 4.7–6.1)
SODIUM SERPL-SCNC: 136 MMOL/L (ref 135–145)
WBC # BLD AUTO: 6.5 K/UL (ref 4.8–10.8)

## 2019-03-10 PROCEDURE — A9270 NON-COVERED ITEM OR SERVICE: HCPCS | Performed by: NURSE PRACTITIONER

## 2019-03-10 PROCEDURE — 36415 COLL VENOUS BLD VENIPUNCTURE: CPT

## 2019-03-10 PROCEDURE — 85025 COMPLETE CBC W/AUTO DIFF WBC: CPT

## 2019-03-10 PROCEDURE — 700111 HCHG RX REV CODE 636 W/ 250 OVERRIDE (IP): Performed by: NURSE PRACTITIONER

## 2019-03-10 PROCEDURE — 80048 BASIC METABOLIC PNL TOTAL CA: CPT

## 2019-03-10 PROCEDURE — 700102 HCHG RX REV CODE 250 W/ 637 OVERRIDE(OP): Performed by: NURSE PRACTITIONER

## 2019-03-10 RX ORDER — AMOXICILLIN AND CLAVULANATE POTASSIUM 875; 125 MG/1; MG/1
1 TABLET, FILM COATED ORAL 2 TIMES DAILY
Qty: 14 TAB | Refills: 0 | Status: SHIPPED | OUTPATIENT
Start: 2019-03-10 | End: 2019-03-17

## 2019-03-10 RX ORDER — TRAMADOL HYDROCHLORIDE 50 MG/1
50 TABLET ORAL EVERY 6 HOURS PRN
Qty: 10 TAB | Refills: 0 | Status: SHIPPED | OUTPATIENT
Start: 2019-03-10 | End: 2019-03-15

## 2019-03-10 RX ADMIN — KETOROLAC TROMETHAMINE 30 MG: 30 INJECTION, SOLUTION INTRAMUSCULAR; INTRAVENOUS at 05:23

## 2019-03-10 RX ADMIN — AMOXICILLIN AND CLAVULANATE POTASSIUM 1 TABLET: 875; 125 TABLET, FILM COATED ORAL at 05:23

## 2019-03-10 RX ADMIN — OXYCODONE HYDROCHLORIDE 10 MG: 5 TABLET ORAL at 13:03

## 2019-03-10 RX ADMIN — KETOROLAC TROMETHAMINE 30 MG: 30 INJECTION, SOLUTION INTRAMUSCULAR; INTRAVENOUS at 13:03

## 2019-03-10 NOTE — PROGRESS NOTES
Surgical Progress Note:    POD #3 S/P IR drain placement  Doing well. Neg N/V, + ostomy output, Pain controlled, Denies chest pain or SOB.  Ambulating. Tolerating PO. IR drain with serous drainage  Final cultures show e coli.  Wants to go home    PE:  /82   Pulse 66   Temp 36.5 °C (97.7 °F) (Temporal)   Resp 16   Ht 1.829 m (6')   Wt 70.1 kg (154 lb 8.7 oz)   SpO2 98%   BMI 20.96 kg/m²     I/O:   Intake/Output Summary (Last 24 hours) at 03/08/19 0725  Last data filed at 03/08/19 0400   Gross per 24 hour   Intake             3000 ml   Output             1225 ml   Net             1775 ml         Review of Systems   Constitutional: Negative.  Negative for chills and fever.   Respiratory: Negative for shortness of breath.    Cardiovascular: Negative for chest pain.   Gastrointestinal: Negative for nausea and vomiting.        +flatus/+ stool   Genitourinary: Negative.      Physical Exam   Constitutional:  appears well-developed.   Neck: Neck supple.   Cardiovascular: Normal rate.    Pulmonary/Chest: Effort normal.   Abdominal: Soft.  exhibits no distension. Appropriate tenderness.   Incisions clean, dry, and intact   drain with serous drinage  Musculoskeletal: Normal range of motion.   Neurological:  alert.   Skin:  Warm and dry.   Extremities: najera, no edema    Labs:  Recent Labs      03/09/19   0420  03/10/19   0558   WBC  7.4  6.5   RBC  3.05*  3.22*   HEMOGLOBIN  8.3*  8.9*   HEMATOCRIT  26.4*  27.8*   MCV  86.6  86.3   MCH  27.2  27.6   RDW  49.9  49.4   PLATELETCT  447*  420   MPV  9.1  9.5   NEUTSPOLYS  62.60  66.80   LYMPHOCYTES  23.50  18.50*   MONOCYTES  9.70  8.70   EOSINOPHILS  2.30  4.00   BASOPHILS  1.20  0.80     Recent Labs      03/09/19   0420  03/10/19   0558   SODIUM  137  136   POTASSIUM  4.1  4.1   CHLORIDE  107  105   CO2  24  23   GLUCOSE  103*  90   BUN  8  8         A/P:     - Regular low residue diet as tolerated.  - IS Q One hour while awake  - Ambulate.  Out of bed for all meals  please  - Pain controlled.  Cont PO pain medication  - Labs noted, normalized  - DVT propholaxis, ok to cont Lovenox, sequentials and ambulate  - Adequate urine output.  Cont, to monitor  - change to Augmentin  - D/C drain  - Will D/C home. Discharge instructions given.  Precautions and limitations reviewed.  Pt stated understanding and agrees with this plan of care.  F/U in one week and prn.     Postoperative intra-abdominal abscess- (present on admission)   Assessment & Plan    3/7 IR drain placement, cultures         Discussed with Patient, RN and Dr. Candice Lee, A.P.AUDIE.  [facility]  [phone]

## 2019-03-10 NOTE — PROGRESS NOTES
Patients IR drain in LLQ was flushed with 20ml NS per order, 14 ml pulled back. Patient tolerated well.

## 2019-03-10 NOTE — DISCHARGE INSTRUCTIONS
Discharge Instructions    Discharged to home by car with relative. Discharged via wheelchair, hospital escort: Yes.  Special equipment needed: Not Applicable    Be sure to schedule a follow-up appointment with your primary care doctor or any specialists as instructed.     Discharge Plan:   Influenza Vaccine Indication: Patient Refuses    I understand that a diet low in cholesterol, fat, and sodium is recommended for good health. Unless I have been given specific instructions below for another diet, I accept this instruction as my diet prescription.   Other diet: regular    Special Instructions: None    · Is patient discharged on Warfarin / Coumadin?   No     Depression / Suicide Risk    As you are discharged from this [facility] facility, it is important to learn how to keep safe from harming yourself.    Recognize the warning signs:  · Abrupt changes in personality, positive or negative- including increase in energy   · Giving away possessions  · Change in eating patterns- significant weight changes-  positive or negative  · Change in sleeping patterns- unable to sleep or sleeping all the time   · Unwillingness or inability to communicate  · Depression  · Unusual sadness, discouragement and loneliness  · Talk of wanting to die  · Neglect of personal appearance   · Rebelliousness- reckless behavior  · Withdrawal from people/activities they love  · Confusion- inability to concentrate     If you or a loved one observes any of these behaviors or has concerns about self-harm, here's what you can do:  · Talk about it- your feelings and reasons for harming yourself  · Remove any means that you might use to hurt yourself (examples: pills, rope, extension cords, firearm)  · Get professional help from the community (Mental Health, Substance Abuse, psychological counseling)  · Do not be alone:Call your Safe Contact- someone whom you trust who will be there for you.  · Call your local CRISIS HOTLINE 654-4097 or  385.555.3649  · Call your local Children's Mobile Crisis Response Team [place] (264) 110-7199 or www.[employer]  · Call the toll free National Suicide Prevention Hotlines   · National Suicide Prevention Lifeline 815-911-KRFT (7783)  · National Hope Line Network 800-SUICIDE (487-5650)    Colostomy Home Guide  A colostomy is an opening for stool to leave your body when a medical condition prevents it from leaving through the usual opening (rectum). During a surgery, a piece of large intestine (colon) is brought through a hole in the abdominal wall. The new opening is called a stoma or ostomy. A bag or pouch fits over the stoma to catch stool and gas. Your stool may be liquid, somewhat pasty, or formed.  CARING FOR YOUR STOMA   Normally, the stoma looks a lot like the inside of your cheek: pink, red, and moist. At first it may be swollen, but this swelling will decrease within 6 weeks.  Keep the skin around your stoma clean and dry. You can gently wash your stoma and the skin around your stoma in the shower with a clean, soft washcloth. If you develop any skin irritation, your caregiver may give you a stoma powder or ointment to help heal the area. Do not use any products other than those specifically given to you by your caregiver.   Your stoma should not be uncomfortable. If you notice any stinging or burning, your pouch may be leaking, and the skin around your stoma may be coming into contact with stool. This can cause skin irritation. If you notice stinging, replace your pouch with a new one and discard the old one.  OSTOMY POUCHES   The pouch that fits over the ostomy can be made up of either 1 or 2 pieces. A one-piece pouch has a skin barrier piece and the pouch itself in one unit. A two-piece pouch has a skin barrier with a separate pouch that snaps on and off of the skin barrier. Either way, you should empty the pouch when it is only  to ½ full. Do not let more stool or gas build up. This could  cause the pouch to leak.  Some ostomy bags have a built-in gas release valve. Ostomy deodorizer (5 drops) can be put into the pouch to prevent odor. Some people use ostomy lubricant drops inside the pouch to help the stool slide out of the bag more easily and completely.   EMPTYING YOUR OSTOMY POUCH   You may get lessons on how to empty your pouch from a wound-ostomy nurse before you leave the hospital. Here are the basic steps:  · Wash your hands with soap and water.  · Sit far back on the toilet.  · Put several pieces of toilet paper into the toilet water. This will prevent splashing as you empty the stool into the toilet bowl.  · Unclip or unvelcro the tail end of the pouch.  · Unroll the tail and empty stool into the toilet.  · Clean the tail with toilet paper.  · Reroll the tail, and clip or velcro it closed.  · Wash your hands again.  CHANGING YOUR OSTOMY POUCH   Change your ostomy pouch about every 3 to 4 days for the first 6 weeks, then every 5 to7 days. Always change the bag sooner if there is any leakage or you begin to notice any discomfort or irritation of the skin around the stoma. When possible, plan to change your ostomy pouch before eating or drinking as this will lessen the chance of stool coming out during the pouch change. A wound-ostomy nurse may teach you how to change your pouch before you leave the hospital. Here are the basic steps:  · Lay out your supplies.  · Wash your hands with soap and water.  · Carefully remove the old pouch.  · Wash the stoma and allow it to dry. Men may be advised to shave any hair around the stoma very carefully. This will make the adhesive stick better.  · Use the stoma measuring guide that comes with your pouch set to decide what size hole you will need to cut in the skin barrier piece. Choose the smallest possible size that will hold the stoma but will not touch it.  · Use the guide to trace the [place] on the back of the skin barrier piece. Cut out the  hole.  · Hold the skin barrier piece over the stoma to make sure the hole is the correct size.  · Remove the adhesive paper backing from the skin barrier piece.  · Squeeze stoma paste around the opening of the skin barrier piece.  · Clean and dry the skin around the stoma again.  · Carefully fit the skin barrier piece over your stoma.  · If you are using a two-piece pouch, snap the pouch onto the skin barrier piece.  · Close the tail of the pouch.  · Put your hand over the top of the skin barrier piece to help warm it for about 5 minutes, so that it conforms to your body better.  · Wash your hands again.  DIET TIPS   · Continue to follow your usual diet.  · Drink about eight 8 oz glasses of water each day.  · You can prevent gas by eating slowly and chewing your food thoroughly.  · If you feel concerned that you have too much gas, you can cut back on gas-producing foods, such as:  ¨ Spicy foods.  ¨ Onions and garlic.  ¨ Cruciferous vegetables (cabbage, broccoli, cauliflower, [place] sprouts).  ¨ Beans and legumes.  ¨ Some cheeses.  ¨ Eggs.  ¨ Fish.  ¨ Bubbly (carbonated) drinks.  ¨ Chewing gum.  GENERAL TIPS   · You can shower with or without the bag in place.  · Always keep the bag on if you are bathing or swimming.  · If your bag gets wet, you can dry it with a blow-dryer set to cool.  · Avoid wearing tight clothing directly over your stoma so that it does not become irritated or bleed. Tight clothing can also prevent stool from draining into the pouch.  · It is helpful to always have an extra skin barrier and pouch with you when traveling. Do not leave them anywhere too warm, as parts of them can melt.  · Do not let your seat belt rest on your stoma. Try to keep the seat belt either above or below your stoma, or use a tiny pillow to cushion it.  · You can still participate in sports, but you should avoid activities in which there is a risk of getting hit in the abdomen.  · You can still have sex. It is a good  idea to empty your pouch prior to sex. Some people and their partners feel very comfortable seeing the pouch during sex. Others choose to wear lingerie or a T-shirt that covers the device.  SEEK IMMEDIATE MEDICAL CARE IF:  · You notice a change in the size or color of the stoma, especially if it becomes very red, purple, black, or pale white.  · You have bloody stools or bleeding from the stoma.  · You have abdominal pain, nausea, vomiting, or bloating.  · There is anything unusual protruding from the stoma.  · You have irritation or red skin around the stoma.  · No stool is passing from the stoma.  · You have diarrhea (requiring more frequent than normal pouch emptying).     This information is not intended to replace advice given to you by your health care provider. Make sure you discuss any questions you have with your health care provider.     Document Released: 12/20/2004 Document Revised: 03/11/2013 Document Reviewed: 05/16/2012  [employer] Interactive Patient Education ©2016 [employer]      Diverticulitis  Diverticulitis is when small pockets that have formed in your colon (large intestine) become infected or swollen.  Follow these instructions at home:  · Follow your doctor's instructions.  · Follow a special diet if told by your doctor.  · When you feel better, your doctor may tell you to change your diet. You may be told to eat a lot of fiber. Fruits and vegetables are good sources of fiber. Fiber makes it easier to poop (have bowel movements).  · Take supplements or probiotics as told by your doctor.  · Only take medicines as told by your doctor.  · Keep all follow-up visits with your doctor.  Contact a doctor if:  · Your pain does not get better.  · You have a hard time eating food.  · You are not pooping like normal.  Get help right away if:  · Your pain gets worse.  · Your problems do not get better.  · Your problems suddenly get worse.  · You have a fever.  · You keep throwing up (vomiting).  · You  have bloody or black, tarry poop (stool).  This information is not intended to replace advice given to you by your health care provider. Make sure you discuss any questions you have with your health care provider.  Document Released: 06/05/2009 Document Revised: 05/25/2017 Document Reviewed: 11/12/2014  [employer] Interactive Patient Education © 2017 Elsevier Inc.

## 2019-03-10 NOTE — CARE PLAN
Problem: Infection  Goal: Will remain free from infection    Intervention: Assess signs and symptoms of infection  View pt. incisions and drain sites for signs of infection.      Problem: Mobility  Goal: Risk for activity intolerance will decrease    Intervention: Assess and monitor signs of activity intolerance  Assessed pt. Walking steadily down hallway.

## 2019-03-10 NOTE — CARE PLAN
Problem: Bowel/Gastric:  Goal: Normal bowel function is maintained or improved  Outcome: PROGRESSING AS EXPECTED  Pt has appropriate moderate output from the ostomy bag    Problem: Knowledge Deficit  Goal: Knowledge of disease process/condition, treatment plan, diagnostic tests, and medications will improve  Outcome: PROGRESSING AS EXPECTED  Pt reports understanding plan of care and has no questions at this time.

## 2019-03-10 NOTE — DISCHARGE SUMMARY
Discharge Summary    CHIEF COMPLAINT ON ADMISSION  Chief Complaint   Patient presents with   • Weakness   • Abdominal Cramping   • Chest Pain   • Shortness of Breath       Reason for Admission  Post Op Complication     Admission Date  3/6/2019    CODE STATUS  Full Code    HPI & HOSPITAL COURSE  This is a 58 y.o. male here with multiple intra abdominal abscess.  A drain was placed and cultures taken by IR.  The remainder of his stay was essentially unremarkable.   At the time of discharge he was afebrile, tolerating a regular diet and voiding normally.  His general exam is unremarkable.  His abdominal incisions are healing satisfactorily.  Patient has been counseled on normal expectations of an uncomplicated post operative coarse.  He was discharged on a regular diet.  He has restarted her pre-admission medications.  His discharge medications are as below.   He is to follow up with Dr. Napoles in one week and prn.  Discharge instructions were given. Precautions and limitations were reviewed.     The patient was counseled regarding the benefits of narcotics for post-operative pain in the short term period. The patient was made aware of the alternatives, including heating pads, ice, and NSAID medication.    The risks of addiction, overdose, respiratory depression, and risks during pregnancy (if applicable) along with warning signs of addiction, were discussed.  We discussed taking the medications as prescribed and how they can interact with other medications the patient is currently taking.     The patient was notified not to share the medications with others and understands that these medications are intended to be used only in the short term for post-operative pain.    I reviewed the NarcRx  program, and the patient was deemed not to be at high risk for abuse, and had no concurrent prescriptions.    The patient was given a chance to ask questions, and all questions were answered. Discussion was undertaken with  Layman's terms. The patient demonstrated adequate understanding. Consent was given and signed preoperatively in clear state of mind.    The patient has stated understanding and agrees with this plan of care.                 Cultures and Sensitivities:             Escherichia coli   Moderate growth     ESCHERICHIA COLI     Antibiotic Sensitivity Microscan Unit Status   Ampicillin Sensitive <=8 mcg/mL Final   Cefepime Sensitive <=8 mcg/mL Final   Cefotaxime Sensitive <=2 mcg/mL Final   Cefotetan Sensitive <=16 mcg/mL Final   Ceftazidime Sensitive <=1 mcg/mL Final   Ceftriaxone Sensitive <=8 mcg/mL Final   Cefuroxime Sensitive <=4 mcg/mL Final   Ciprofloxacin Sensitive <=1 mcg/mL Final   Ertapenem Sensitive <=1 mcg/mL Final   Gentamicin Sensitive <=4 mcg/mL Final   Pip/Tazobactam Sensitive <=16 mcg/mL Final   Tigecycline Sensitive <=2 mcg/mL Final   Tobramycin Sensitive <=4 mcg/mL Final   Trimeth/Sulfa Sensitive <=2/38 mcg/mL Final               Therefore, he is discharged in good and stable condition to home with close outpatient follow-up.      Discharge Date  3/10/2019      FOLLOW UP ITEMS POST DISCHARGE  None    DISCHARGE DIAGNOSES  Postoperative intra-abdominal abscess- (present on admission)   Assessment & Plan    3/7 IR drain placement, cultures         FOLLOW UP  Future Appointments  Date Time Provider Department Center   4/1/2019 9:00 AM Kassie Lilly M.D. [facility] None     Dianna Napoles M.D.  [address]  [address]  [facility] [address]  [phone]    In 1 week  and prn      MEDICATIONS ON DISCHARGE     Medication List      START taking these medications      Instructions   tramadol 50 MG Tabs  Commonly known as:  ULTRAM   Take 1 Tab by mouth every 6 hours as needed for up to 5 days.  Dose:  50 mg        CONTINUE taking these medications      Instructions   allopurinol 100 MG Tabs  Commonly known as:  ZYLOPRIM   Take 200 mg by mouth every day.  Dose:  200 mg     amoxicillin-clavulanate 875-125 MG  "Tabs  Commonly known as:  AUGMENTIN   Take 1 Tab by mouth 2 times a day for 7 days.  Dose:  1 Tab     indomethacin 50 MG Caps  Commonly known as:  INDOCIN   Take 50 mg by mouth every 6 hours.  Dose:  50 mg            Allergies  Allergies   Allergen Reactions   • Vicodin [Hydrocodone-Acetaminophen]      Constipation, pt denies true allergy \"I just dont like taking pain medication\"       DIET  Orders Placed This Encounter   Procedures   • Diet Order Regular     Standing Status:   Standing     Number of Occurrences:   1     Order Specific Question:   Diet:     Answer:   Regular [1]     Order Specific Question:   Miscellaneous modifications:     Answer:   6 Small Meals [4]       ACTIVITY  As tolerated.  Weight bearing as tolerated    CONSULTATIONS  None    PROCEDURES  IR drain placement    LABORATORY  Lab Results   Component Value Date    SODIUM 136 03/10/2019    POTASSIUM 4.1 03/10/2019    CHLORIDE 105 03/10/2019    CO2 23 03/10/2019    GLUCOSE 90 03/10/2019    BUN 8 03/10/2019    CREATININE 0.75 03/10/2019        Lab Results   Component Value Date    WBC 6.5 03/10/2019    HEMOGLOBIN 8.9 (L) 03/10/2019    HEMATOCRIT 27.8 (L) 03/10/2019    PLATELETCT 420 03/10/2019        Total time of the discharge process exceeds 42 minutes.  "

## 2019-03-10 NOTE — PROGRESS NOTES
Pt laying in bed, call light within reach, bed lowered and locked, fall education reinforced. Pt is A&Ox4 and on room air. Pt lung sounds are clear in all lobes, bowel sounds are normoactive in all four quadrants, heart sounds are within defined limits. Pt has a LLQ IR drain with dressing that is clean,dry,intact,and drain is patent outputting small amounts of serosanguinous drainage. Pt has a LLQ ostomy with a pink stoma that is outputting moderate amounts of dark green stool and is clean,dry,and intact. Pt has a midline incision open to air approximated with staples. The inferior section of the midline incision has dehisced. MD is aware and this RN marked the site with a marker to keep track of the extent. Pt IV is clean, dry,intact,and patent and saline locked.

## 2019-03-10 NOTE — PROGRESS NOTES
"Bedside report received.  Assessment complete.  A&O x 4. Patient calls appropriately.  Patient ambulatory with S.B. assist.   Patient has 0/10 pain.   Denies N&V. Tolerating regular diet.  Surgical site assessed midline INES 1/2\" dehiscence noted and marked toward bottom of incision, IR drain producing serosanguinous fluid, IR dressing CDI.  + void, + flatus, ostomy BM.  Patient denies SOB.  SCD's off, pt. Has been ambulating very regularly.  Review plan with of care with patient. Call light and personal belongings with in reach. Hourly rounding in place. All needs met at this time.  "

## 2019-03-10 NOTE — PROGRESS NOTES
Discharging Patient home per physician order.    Discharged with daughter.    Demonstrated understanding of discharge instructions, follow up appointments, home medications, prescriptions, home care for surgical wound and nursing care instructions.    Ambulating with no assistance, voiding without difficulty, pain well controlled, tolerating oral medications, oxygen saturation greater than 90%, tolerating diet.    Educational handouts given and discussed.    Verbalized understanding of discharge instructions and educational handouts.    All questions answered.    Belongings with patient at time of discharge.

## 2019-03-10 NOTE — DISCHARGE SUMMARY
Discharge Summary    CHIEF COMPLAINT ON ADMISSION  Chief Complaint   Patient presents with   • Abdominal Pain   • Constipation     last normal BM 1 week ago        Reason for Admission  Abdominal/Flank Pain     Admission Date  2/18/2019    CODE STATUS  Full Code    HPI & HOSPITAL COURSE  This is a 58 y.o. male here with perforated diverticulitis.  The patient post operative coarse was essentially unremarkable.  At the time of discharge he was afebrile, tolerating a regular diet and voiding normally.  His general exam is unremarkable.  His abdominal incisions are healing satisfactorily.  Patient has been counseled on normal expectations of an uncomplicated post operative coarse.  He was discharged on a regular diet.  He has restarted her pre-admission medications.  His discharge medications are as below.   He is to follow up with Dr. Napoles in one to two weeks and prn.  Discharge instructions were given. Precautions and limitations were reviewed.     The patient was counseled regarding the benefits of narcotics for post-operative pain in the short term period. The patient was made aware of the alternatives, including heating pads, ice, and NSAID medication.    The risks of addiction, overdose, respiratory depression, and risks during pregnancy (if applicable) along with warning signs of addiction, were discussed.  We discussed taking the medications as prescribed and how they can interact with other medications the patient is currently taking.     The patient was notified not to share the medications with others and understands that these medications are intended to be used only in the short term for post-operative pain.    I reviewed the [employer]  program, and the patient was deemed not to be at high risk for abuse, and had no concurrent prescriptions.    The patient was given a chance to ask questions, and all questions were answered. Discussion was undertaken with Layman's terms. The patient demonstrated adequate  understanding. Consent was given and signed preoperatively in clear state of mind.    The patient has stated understanding and agrees with this plan of care.                 Pathological exam:             A. Sigmoid colon:         Diverticulitis with multiple diverticuli demonstrating          perforation and abscess cavities.         Severe exudate and adhesions. Negative for malignancy.         Three benign lymph nodes negative for metastatic carcinoma.          (0/3)         Negative for malignancy.          Therefore, he is discharged in good and stable condition to home with close outpatient follow-up.      Discharge Date  2/26/2019    FOLLOW UP ITEMS POST DISCHARGE  Ostomy supplies    DISCHARGE DIAGNOSES  Diverticulitis of colon with perforation   Assessment & Plan    Perforated diverticulitis, with multiple abdominal abscesses  2/18 - Colectomy/colostomy  2/23 - Ongoing leukocytosis - Abd CT, no abscess         FOLLOW UP  Future Appointments  Date Time Provider Department Center   4/1/2019 9:00 AM Kassie Lilly M.D. [facility] None     Dianna Napoles M.D.  [address]  [address]  [facility] [address]  [phone]    In 1 week  and prn    Pcp Pt States None            MEDICATIONS ON DISCHARGE     Medication List      CONTINUE taking these medications      Instructions   allopurinol 100 MG Tabs  Commonly known as:  ZYLOPRIM   Take 200 mg by mouth every day.  Dose:  200 mg     indomethacin 50 MG Caps  Commonly known as:  INDOCIN   Take 50 mg by mouth every 6 hours.  Dose:  50 mg        ASK your doctor about these medications      Instructions   amoxicillin-clavulanate 875-125 MG Tabs  Commonly known as:  AUGMENTIN  Ask about: Should I take this medication?   Doctor's comments:  Per ID TID for complex intra abdominal abscess for colonic perforation  Take 1 Tab by mouth 3 times a day for 5 days.  Dose:  1 Tab     tramadol 50 MG Tabs  Commonly known as:  ULTRAM  Ask about: Should I take this medication?    "Take 1 Tab by mouth every four hours as needed for up to 7 days.  Dose:  50 mg            Allergies  Allergies   Allergen Reactions   • Vicodin [Hydrocodone-Acetaminophen]      Constipation, pt denies true allergy \"I just dont like taking pain medication\"       DIET  Regular     ACTIVITY  As tolerated.  Weight bearing as tolerated    CONSULTATIONS  Inpatient wound clinic, Outpatient wound clinic    PROCEDURES  Exploratory celiotomy, sigmoid colectomy with end   colostomy and washout of multiple abdominal abscesses.    LABORATORY  Lab Results   Component Value Date    SODIUM 136 03/10/2019    POTASSIUM 4.1 03/10/2019    CHLORIDE 105 03/10/2019    CO2 23 03/10/2019    GLUCOSE 90 03/10/2019    BUN 8 03/10/2019    CREATININE 0.75 03/10/2019        Lab Results   Component Value Date    WBC 6.5 03/10/2019    HEMOGLOBIN 8.9 (L) 03/10/2019    HEMATOCRIT 27.8 (L) 03/10/2019    PLATELETCT 420 03/10/2019        Total time of the discharge process exceeds 48 minutes.  "

## 2019-03-11 LAB
BACTERIA BLD CULT: NORMAL
BACTERIA BLD CULT: NORMAL
SIGNIFICANT IND 70042: NORMAL
SIGNIFICANT IND 70042: NORMAL
SITE SITE: NORMAL
SITE SITE: NORMAL
SOURCE SOURCE: NORMAL
SOURCE SOURCE: NORMAL

## 2019-04-01 ENCOUNTER — OFFICE VISIT (OUTPATIENT)
Dept: INTERNAL MEDICINE | Facility: MEDICAL CENTER | Age: 59
End: 2019-04-01
Payer: MEDICAID

## 2019-04-01 VITALS
HEIGHT: 72 IN | SYSTOLIC BLOOD PRESSURE: 152 MMHG | WEIGHT: 160.4 LBS | DIASTOLIC BLOOD PRESSURE: 87 MMHG | OXYGEN SATURATION: 97 % | BODY MASS INDEX: 21.73 KG/M2 | TEMPERATURE: 97.9 F | HEART RATE: 92 BPM

## 2019-04-01 DIAGNOSIS — K21.9 GASTROESOPHAGEAL REFLUX DISEASE WITHOUT ESOPHAGITIS: ICD-10-CM

## 2019-04-01 DIAGNOSIS — M10.9 GOUT, UNSPECIFIED CAUSE, UNSPECIFIED CHRONICITY, UNSPECIFIED SITE: ICD-10-CM

## 2019-04-01 DIAGNOSIS — Z76.89 ESTABLISHING CARE WITH NEW DOCTOR, ENCOUNTER FOR: ICD-10-CM

## 2019-04-01 DIAGNOSIS — Z12.11 SCREENING FOR COLON CANCER: ICD-10-CM

## 2019-04-01 DIAGNOSIS — D64.9 ANEMIA, UNSPECIFIED TYPE: ICD-10-CM

## 2019-04-01 PROCEDURE — 99214 OFFICE O/P EST MOD 30 MIN: CPT | Mod: GC | Performed by: INTERNAL MEDICINE

## 2019-04-01 RX ORDER — RANITIDINE 150 MG/1
150 TABLET ORAL 2 TIMES DAILY
Qty: 60 TAB | Refills: 11 | Status: SHIPPED | OUTPATIENT
Start: 2019-04-01 | End: 2019-04-01 | Stop reason: SDUPTHER

## 2019-04-01 RX ORDER — RANITIDINE 150 MG/1
TABLET ORAL
Qty: 180 TAB | Refills: 3 | Status: SHIPPED | OUTPATIENT
Start: 2019-04-01 | End: 2019-06-06

## 2019-04-01 RX ORDER — ALLOPURINOL 300 MG/1
300 TABLET ORAL 2 TIMES DAILY
Qty: 60 TAB | Refills: 3 | Status: SHIPPED | OUTPATIENT
Start: 2019-04-01 | End: 2019-06-06

## 2019-04-01 NOTE — TELEPHONE ENCOUNTER
PT SEEN: 4/1/19 WITH Dr. You NEXT APPT 5/6/19 WITH Dr. YOU  Was the patient seen in the last year in this department? Yes     Does patient have an active prescription for medications requested? No     Received Request Via: Pharmacy

## 2019-04-01 NOTE — PROGRESS NOTES
New Patient to Establish    Reason to establish: New patient to establish    CC:   -Establish care with a new physician  -Request medical evaluation and management for chronic gout  -Request medical evaluation and management for anemia    HPI: Mr. Ladd is a 58 years old male who presents to the clinic for the previously mentioned reasons.  Past medical history significant for chronic gout, complicated diverticulitis status post laparotomy, GERD.    Chronic gout:  Patient reports long history of gout with intermittent flareup, patient currently taking allopurinol 200 mg daily with indomethacin 50 mg as needed.  Patient reports worsening of his gout with multiple tophi involving extensor side of elbow joint bilaterally (he reports that tophi are intermittently getting painful especially after minor trauma and sometimes interfering with his daily activities), he never been evaluated by orthopedic surgeon or rheumatologist.  He reports worsening of his hands small joints as well with distal interphalangeal joint swelling and stiffness mainly in the left hand (patient working as a  and probably has a long history of osteoarthritis as well).  Patient reports acid reflux disease probably associated with chronic use of indomethacin.  Patient currently has colostomy bag and the plan for anastomosis will be in May 2019 per patient.    Anemia:  Per medical records his anemia happened suddenly after he had emergent laparotomy so probably it is related to blood loss.  Never had colonoscopy in the past.  Patient currently taking indomethacin every day with acid reflux disease.  Patient currently not taking any medication for acid reflux disease.      Patient Active Problem List    Diagnosis Date Noted   • Diverticulitis of colon with perforation 02/20/2019     Priority: High   • Postoperative intra-abdominal abscess 03/08/2019   • Pain following surgery or procedure 02/26/2019       Past Medical History:    Diagnosis Date   • Diverticulitis    • Gout        Current Outpatient Prescriptions   Medication Sig Dispense Refill   • allopurinol (ZYLOPRIM) 300 MG Tab Take 1 Tab by mouth 2 times a day. 60 Tab 3   • raNITidine (ZANTAC) 150 MG Tab Take 1 Tab by mouth 2 times a day. 60 Tab 11   • indomethacin (INDOCIN) 50 MG Cap Take 50 mg by mouth every 6 hours.     • allopurinol (ZYLOPRIM) 100 MG Tab Take 200 mg by mouth every day.       No current facility-administered medications for this visit.        Allergies as of 04/01/2019 - Reviewed 04/01/2019   Allergen Reaction Noted   • Vicodin [hydrocodone-acetaminophen]  01/14/2010       Social History     Social History   • Marital status: Single     Spouse name: N/A   • Number of children: N/A   • Years of education: N/A     Occupational History   • Not on file.     Social History Main Topics   • Smoking status: Never Smoker   • Smokeless tobacco: Never Used   • Alcohol use No   • Drug use: Yes     Types: Inhaled      Comment: thc rare   • Sexual activity: Not on file     Other Topics Concern   • Not on file     Social History Narrative   • No narrative on file       Family History   Problem Relation Age of Onset   • Cancer Mother 77   • Hypertension Father        Past Surgical History:   Procedure Laterality Date   • EXPLORATORY LAPAROTOMY  2/18/2019    Procedure: EXPLORATORY LAPAROTOMY WITH SIGMOID COLECTOMY, COLOSTOMY CREATION  ABDOMINAL WASHOUT;  Surgeon: Dianna Napoles M.D.;  Location: SURGERY [facility];  Service: General       ROS: As per HPI. Additional pertinent systems as noted below.  Constitutional: Negative for chills and fever.   HENT: Negative for ear pain and sore throat.    Eyes: Negative for discharge and redness.   Respiratory: Negative for cough, hemoptysis, wheezing and stridor.    Cardiovascular: Negative for chest pain, palpitations and leg swelling.   Gastrointestinal: Negative for abdominal pain, constipation, diarrhea, heartburn, nausea and  vomiting.   Genitourinary: Negative for dysuria, flank pain and hematuria.   Musculoskeletal: Negative for falls and myalgias.   Skin: Negative for itching and rash.   Neurological: Negative for dizziness, seizures, loss of consciousness and headaches.   Endo/Heme/Allergies: Negative for polydipsia. Does not bruise/bleed easily.   Psychiatric/Behavioral: Negative for substance abuse and suicidal ideas.       /87 (BP Location: Right arm, Patient Position: Sitting)   Pulse 92   Temp 36.6 °C (97.9 °F) (Temporal)   Ht 1.829 m (6')   Wt 72.8 kg (160 lb 6.4 oz)   SpO2 97%   BMI 21.75 kg/m²     Physical Exam  General:  Alert and oriented, No apparent distress.    Eyes: Pupils equal and reactive. No scleral icterus.    Throat: Clear no erythema or exudates noted.    Neck: Supple. No lymphadenopathy noted. Thyroid not enlarged.    Lungs: Clear to auscultation and percussion bilaterally.    Cardiovascular: Regular rate and rhythm. No murmurs, rubs or gallops.    Abdomen:  Benign. No rebound or guarding noted.  Clean dry colostomy site with bag, no bleeding or leaking fluids.    Extremities: No clubbing, cyanosis, edema.  Positive for bilateral upper extremity tophi on extensor surface of elbow joint.  Mild swelling and tenderness of the distal interphalangeal joint mainly on the left hand consistent with Heberden nodule.    Skin: Clear. No rash or suspicious skin lesions noted.    Other:     Note: I have reviewed all pertinent labs and diagnostic tests associated with this visit with specific comments listed under the assessment and plan below    Assessment and Plan    1.  Chronic gout with tophi  -Chronic with tophi involving bilateral upper extremities on the extensor part of elbow joint.  -Patient currently taking allopurinol 200 mg with indomethacin 50 mg as needed (reports being off medication over the last 3 days)  -Reports mild improvement on these medications  -Never been evaluated or treated by  rheumatologist  -Reports that his tophi on extensor surface of upper extremities affecting his daily activities and getting really painful after minor trauma  Plan  -Increase the dose of allopurinol to 300 mg twice daily (last kidney function was within normal limits on 3/2019)  -Educate the patient about the side effect of allopurinol   -Advised the patient to avoid using indomethacin because of side effect (GERD and increased blood pressure)  -Refer the patient to rheumatologist  -Referred the patient to orthopedic surgeon for evaluation and management of tophi  -We will follow-up in 4 weeks    2. Normocytic anemia  -Probably secondary to blood loss during urgent laparotomy secondary to burst of diverticulitis.  -Never had colonoscopy before  -Patient currently has colostomy back on the plan for anastomosis will be in May 2019  -Denies chest pain, palpitation, shortness of breath, easy fatigue  Plan  -Test for ferritin level, folate level, B12 level, iron level, total iron binding capacity  -Repeat CBC  -We will decide about next step in management of leukemia blood test results  -Educate the patient about anemia  -We will follow-up in 4 weeks    3. Establishing care with new doctor, encounter for  -Patient reports that he does not have a primary care physician and would like to establish care with us    4. Screening for colon cancer  -Patient never had colonoscopy before  -Negative family history of colon cancer  -Normocytic anemia probably because of recent urgent laparotomy but could not exclude GI bleeding  Plan  -Referral to gastroenterologist    5. Gastroesophageal reflux disease without esophagitis  -Probably secondary to chronic indomethacin use  -Denies using any medication for GERD currently  -Reports active acid reflux disease  -Physical examination is significant for mild epigastric tenderness on deep palpation  Plan  -Prescribe Zantac 150 mg twice daily  -Educate the patient about side effect of  Zantac and indomethacin  -Advised the patient to avoid using NSAIDs as possible      Followup: Return in about 4 weeks (around 4/29/2019).    Risk Assessment (discuss potential complications a function of chronic problems): Risk assessment has been discussed with the patient    Complexity (discuss number of co-morbidities): Comorbidities have been discussed with the patient    Signed by: Kassie Lilly M.D.

## 2019-04-23 ENCOUNTER — OFFICE VISIT (OUTPATIENT)
Dept: INTERNAL MEDICINE | Facility: MEDICAL CENTER | Age: 59
End: 2019-04-23
Payer: MEDICAID

## 2019-04-23 VITALS
SYSTOLIC BLOOD PRESSURE: 124 MMHG | BODY MASS INDEX: 23.03 KG/M2 | OXYGEN SATURATION: 97 % | DIASTOLIC BLOOD PRESSURE: 88 MMHG | HEART RATE: 109 BPM | WEIGHT: 170 LBS | HEIGHT: 72 IN | TEMPERATURE: 98.3 F

## 2019-04-23 DIAGNOSIS — H91.90 DECREASED HEARING, UNSPECIFIED LATERALITY: ICD-10-CM

## 2019-04-23 PROCEDURE — 99213 OFFICE O/P EST LOW 20 MIN: CPT | Mod: GE | Performed by: INTERNAL MEDICINE

## 2019-04-23 ASSESSMENT — PAIN SCALES - GENERAL: PAINLEVEL: 2=MINIMAL-SLIGHT

## 2019-04-23 NOTE — PROGRESS NOTES
"      Established Patient    Marbin presents today with the following:    CC: Right ear earwax    HPI: Marbin Ladd is a 58 y.o. male with history of gout, GERD, diverticulitis complicated by abscesses, who presents today for the following concerns:    Earwax and right ear  Patient states he went to get hearing aids, however he was told he needs to get his ears cleaned of earwax before he could be fitted for hearing aids.  Has not had a problem with earwax before or had ear wax cleaned from his ears.  Not denies history of ear infection, eardrum perforation, or other ear problems in the past.  Denies fever, chills, ear drainage, upper respiratory symptoms.  He states after he was told he needed earwax removed, he went about over-the-counter cleaning solution which he used and got \"a lot of stuff\" out of his right ear.  States his hearing did not change after this, however it has been the same for the last 10 years.  Patient states he was a  for most of his life, using [employer], also did mining, and that he has chronic hearing problems.      Patient Active Problem List    Diagnosis Date Noted   • Diverticulitis of colon with perforation 02/20/2019     Priority: High   • Postoperative intra-abdominal abscess 03/08/2019   • Pain following surgery or procedure 02/26/2019       Current Outpatient Prescriptions   Medication Sig Dispense Refill   • allopurinol (ZYLOPRIM) 300 MG Tab Take 1 Tab by mouth 2 times a day. (Patient not taking: Reported on 4/23/2019) 60 Tab 3   • raNITidine (ZANTAC) 150 MG Tab TAKE 1 TABLET BY MOUTH TWICE DAILY (Patient not taking: Reported on 4/23/2019) 180 Tab 3   • indomethacin (INDOCIN) 50 MG Cap Take 50 mg by mouth every 6 hours.     • allopurinol (ZYLOPRIM) 100 MG Tab Take 200 mg by mouth every day.       No current facility-administered medications for this visit.        ROS: As per HPI. Additional pertinent symptoms as noted below.    Constitutional: Denies weight loss, " fever, chills, weakness, malaise.  Eyes: Denies blurred vision, double vision, yellow sclerae.  ENT: See HPI.  Cardiovascular: Denies chest pain, palpitations.  Respiratory: Denies dyspnea, productive cough.  GI: Denies nausea, vomiting, diarrhea, abdominal pain.  : Denies dysuria, urinary urgency or frequency.  Musculo-skeletal: Denies muscle spasms, joint stiffness.  Skin: Denies change in skin, hair, nails.  Neurological: Denies paresthesias, fasciculations, seizures, focal weakness.  Psychological: Denies change in personality, affect, depression.  All others negative    /88 (BP Location: Left arm, Patient Position: Sitting, BP Cuff Size: Adult)   Pulse (!) 109   Temp 36.8 °C (98.3 °F) (Temporal)   Ht 1.829 m (6')   Wt 77.1 kg (170 lb)   SpO2 97%   BMI 23.06 kg/m²     Physical Exam   General: No apparent distress.  Eyes: Extraocular movements grossly intact. No scleral icterus.  Ears: Right tympanic membrane visible with signs of sclerosis and without significant intracanal sebum, left ear canal more tortuous and difficult to visualize tympanic membrane however no excessive cerumen noted  Throat: No erythema or exudates noted.  Neck: Supple. No lymphadenopathy noted. Thyroid not enlarged.  Lungs: Clear to auscultation bilaterally.  Cardiovascular: Regular rate and rhythm.  Abdomen: Soft, non-tender, non-distended.  Extremities: No cyanosis or edema.  Skin: No rash or suspicious skin lesions noted on exposed skin.  Neurological: Alert and oriented.  Psychiatric: Normal mood and affect.    Note: I have reviewed all pertinent labs and diagnostic tests associated with this visit with specific comments listed under the assessment and plan below    Assessment and Plan    1. Decreased hearing, unspecified laterality  Chronic occupational related problem.  Patient appears to have successfully removed any significant cerumen impaction using over-the-counter therapies.  There is no significant C bone  visualized on exam that needs cleaning.  -Patient to get hearing aids as previously planned    Followup: Return in about 1 month (around 5/23/2019).      Signed by: Fabio Negrete M.D.

## 2019-05-31 ENCOUNTER — HOSPITAL ENCOUNTER (OUTPATIENT)
Dept: RADIOLOGY | Facility: MEDICAL CENTER | Age: 59
End: 2019-05-31
Attending: SURGERY
Payer: MEDICAID

## 2019-05-31 DIAGNOSIS — K57.92 DIVERTICULITIS: ICD-10-CM

## 2019-05-31 PROCEDURE — 700117 HCHG RX CONTRAST REV CODE 255: Performed by: SURGERY

## 2019-05-31 PROCEDURE — 74270 X-RAY XM COLON 1CNTRST STD: CPT

## 2019-05-31 RX ADMIN — IOHEXOL 200 ML: 300 INJECTION, SOLUTION INTRAVENOUS at 11:00

## 2019-06-12 ENCOUNTER — ANESTHESIA EVENT (OUTPATIENT)
Dept: SURGERY | Facility: MEDICAL CENTER | Age: 59
DRG: 329 | End: 2019-06-12
Payer: MEDICAID

## 2019-06-13 ENCOUNTER — HOSPITAL ENCOUNTER (INPATIENT)
Facility: MEDICAL CENTER | Age: 59
LOS: 20 days | DRG: 329 | End: 2019-07-03
Attending: SURGERY | Admitting: SURGERY
Payer: MEDICAID

## 2019-06-13 ENCOUNTER — ANESTHESIA (OUTPATIENT)
Dept: SURGERY | Facility: MEDICAL CENTER | Age: 59
DRG: 329 | End: 2019-06-13
Payer: MEDICAID

## 2019-06-13 DIAGNOSIS — G89.18 PAIN FOLLOWING SURGERY OR PROCEDURE: ICD-10-CM

## 2019-06-13 PROCEDURE — 700105 HCHG RX REV CODE 258: Performed by: SURGERY

## 2019-06-13 PROCEDURE — A9270 NON-COVERED ITEM OR SERVICE: HCPCS | Performed by: ANESTHESIOLOGY

## 2019-06-13 PROCEDURE — 700102 HCHG RX REV CODE 250 W/ 637 OVERRIDE(OP): Performed by: ANESTHESIOLOGY

## 2019-06-13 PROCEDURE — 160009 HCHG ANES TIME/MIN: Performed by: SURGERY

## 2019-06-13 PROCEDURE — 500383 HCHG DRAIN, PENROSE 3/8X12: Performed by: SURGERY

## 2019-06-13 PROCEDURE — 501838 HCHG SUTURE GENERAL: Performed by: SURGERY

## 2019-06-13 PROCEDURE — 501445 HCHG STAPLER, SKIN DISP: Performed by: SURGERY

## 2019-06-13 PROCEDURE — 160029 HCHG SURGERY MINUTES - 1ST 30 MINS LEVEL 4: Performed by: SURGERY

## 2019-06-13 PROCEDURE — 160036 HCHG PACU - EA ADDL 30 MINS PHASE I: Performed by: SURGERY

## 2019-06-13 PROCEDURE — 700101 HCHG RX REV CODE 250: Performed by: ANESTHESIOLOGY

## 2019-06-13 PROCEDURE — 700111 HCHG RX REV CODE 636 W/ 250 OVERRIDE (IP): Performed by: ANESTHESIOLOGY

## 2019-06-13 PROCEDURE — 160002 HCHG RECOVERY MINUTES (STAT): Performed by: SURGERY

## 2019-06-13 PROCEDURE — 160041 HCHG SURGERY MINUTES - EA ADDL 1 MIN LEVEL 4: Performed by: SURGERY

## 2019-06-13 PROCEDURE — 160022 HCHG BLOCK: Performed by: SURGERY

## 2019-06-13 PROCEDURE — 770006 HCHG ROOM/CARE - MED/SURG/GYN SEMI*

## 2019-06-13 PROCEDURE — 160035 HCHG PACU - 1ST 60 MINS PHASE I: Performed by: SURGERY

## 2019-06-13 PROCEDURE — A4314 CATH W/DRAINAGE 2-WAY LATEX: HCPCS | Performed by: SURGERY

## 2019-06-13 PROCEDURE — 700111 HCHG RX REV CODE 636 W/ 250 OVERRIDE (IP): Performed by: SURGERY

## 2019-06-13 PROCEDURE — 160048 HCHG OR STATISTICAL LEVEL 1-5: Performed by: SURGERY

## 2019-06-13 PROCEDURE — 88304 TISSUE EXAM BY PATHOLOGIST: CPT | Mod: 59

## 2019-06-13 PROCEDURE — 700105 HCHG RX REV CODE 258: Performed by: ANESTHESIOLOGY

## 2019-06-13 PROCEDURE — 0DJD8ZZ INSPECTION OF LOWER INTESTINAL TRACT, VIA NATURAL OR ARTIFICIAL OPENING ENDOSCOPIC: ICD-10-PCS | Performed by: SURGERY

## 2019-06-13 PROCEDURE — 0DQN0ZZ REPAIR SIGMOID COLON, OPEN APPROACH: ICD-10-PCS | Performed by: SURGERY

## 2019-06-13 RX ORDER — MAGNESIUM HYDROXIDE 1200 MG/15ML
LIQUID ORAL
Status: COMPLETED | OUTPATIENT
Start: 2019-06-13 | End: 2019-06-13

## 2019-06-13 RX ORDER — SODIUM CHLORIDE, SODIUM LACTATE, POTASSIUM CHLORIDE, CALCIUM CHLORIDE 600; 310; 30; 20 MG/100ML; MG/100ML; MG/100ML; MG/100ML
INJECTION, SOLUTION INTRAVENOUS CONTINUOUS
Status: DISCONTINUED | OUTPATIENT
Start: 2019-06-13 | End: 2019-06-13

## 2019-06-13 RX ORDER — CELECOXIB 200 MG/1
200 CAPSULE ORAL ONCE
Status: COMPLETED | OUTPATIENT
Start: 2019-06-13 | End: 2019-06-13

## 2019-06-13 RX ORDER — AMOXICILLIN 250 MG
1 CAPSULE ORAL
Status: DISCONTINUED | OUTPATIENT
Start: 2019-06-13 | End: 2019-06-20

## 2019-06-13 RX ORDER — BUPIVACAINE HYDROCHLORIDE 2.5 MG/ML
INJECTION, SOLUTION EPIDURAL; INFILTRATION; INTRACAUDAL PRN
Status: DISCONTINUED | OUTPATIENT
Start: 2019-06-13 | End: 2019-06-13 | Stop reason: SURG

## 2019-06-13 RX ORDER — SODIUM CHLORIDE, SODIUM LACTATE, POTASSIUM CHLORIDE, CALCIUM CHLORIDE 600; 310; 30; 20 MG/100ML; MG/100ML; MG/100ML; MG/100ML
INJECTION, SOLUTION INTRAVENOUS CONTINUOUS
Status: DISCONTINUED | OUTPATIENT
Start: 2019-06-13 | End: 2019-06-16

## 2019-06-13 RX ORDER — ENEMA 19; 7 G/133ML; G/133ML
1 ENEMA RECTAL
Status: DISCONTINUED | OUTPATIENT
Start: 2019-06-13 | End: 2019-06-20

## 2019-06-13 RX ORDER — AMOXICILLIN 250 MG
1 CAPSULE ORAL NIGHTLY
Status: DISCONTINUED | OUTPATIENT
Start: 2019-06-13 | End: 2019-07-03 | Stop reason: HOSPADM

## 2019-06-13 RX ORDER — LABETALOL HYDROCHLORIDE 5 MG/ML
5 INJECTION, SOLUTION INTRAVENOUS
Status: DISCONTINUED | OUTPATIENT
Start: 2019-06-13 | End: 2019-06-13 | Stop reason: HOSPADM

## 2019-06-13 RX ORDER — HYDROMORPHONE HYDROCHLORIDE 1 MG/ML
0.4 INJECTION, SOLUTION INTRAMUSCULAR; INTRAVENOUS; SUBCUTANEOUS
Status: DISCONTINUED | OUTPATIENT
Start: 2019-06-13 | End: 2019-06-13 | Stop reason: HOSPADM

## 2019-06-13 RX ORDER — SODIUM CHLORIDE, SODIUM LACTATE, POTASSIUM CHLORIDE, CALCIUM CHLORIDE 600; 310; 30; 20 MG/100ML; MG/100ML; MG/100ML; MG/100ML
INJECTION, SOLUTION INTRAVENOUS CONTINUOUS
Status: DISPENSED | OUTPATIENT
Start: 2019-06-13 | End: 2019-06-14

## 2019-06-13 RX ORDER — POLYETHYLENE GLYCOL 3350 17 G/17G
1 POWDER, FOR SOLUTION ORAL 2 TIMES DAILY
Status: DISCONTINUED | OUTPATIENT
Start: 2019-06-13 | End: 2019-07-03 | Stop reason: HOSPADM

## 2019-06-13 RX ORDER — OXYCODONE HCL 5 MG/5 ML
5 SOLUTION, ORAL ORAL
Status: DISCONTINUED | OUTPATIENT
Start: 2019-06-13 | End: 2019-06-13 | Stop reason: HOSPADM

## 2019-06-13 RX ORDER — GABAPENTIN 300 MG/1
300 CAPSULE ORAL ONCE
Status: COMPLETED | OUTPATIENT
Start: 2019-06-13 | End: 2019-06-13

## 2019-06-13 RX ORDER — DIPHENHYDRAMINE HYDROCHLORIDE 50 MG/ML
12.5 INJECTION INTRAMUSCULAR; INTRAVENOUS
Status: DISCONTINUED | OUTPATIENT
Start: 2019-06-13 | End: 2019-06-13 | Stop reason: HOSPADM

## 2019-06-13 RX ORDER — CEFOTETAN DISODIUM 2 G/20ML
INJECTION, POWDER, FOR SOLUTION INTRAMUSCULAR; INTRAVENOUS PRN
Status: DISCONTINUED | OUTPATIENT
Start: 2019-06-13 | End: 2019-06-13 | Stop reason: SURG

## 2019-06-13 RX ORDER — MORPHINE SULFATE 10 MG/ML
4 INJECTION, SOLUTION INTRAMUSCULAR; INTRAVENOUS
Status: DISCONTINUED | OUTPATIENT
Start: 2019-06-13 | End: 2019-06-15

## 2019-06-13 RX ORDER — ONDANSETRON 2 MG/ML
4 INJECTION INTRAMUSCULAR; INTRAVENOUS EVERY 4 HOURS PRN
Status: DISCONTINUED | OUTPATIENT
Start: 2019-06-13 | End: 2019-06-20

## 2019-06-13 RX ORDER — DOCUSATE SODIUM 100 MG/1
100 CAPSULE, LIQUID FILLED ORAL 2 TIMES DAILY
Status: DISCONTINUED | OUTPATIENT
Start: 2019-06-13 | End: 2019-06-20

## 2019-06-13 RX ORDER — FAMOTIDINE 20 MG/1
20 TABLET, FILM COATED ORAL 2 TIMES DAILY
Status: DISCONTINUED | OUTPATIENT
Start: 2019-06-13 | End: 2019-06-21

## 2019-06-13 RX ORDER — MAGNESIUM SULFATE HEPTAHYDRATE 40 MG/ML
INJECTION, SOLUTION INTRAVENOUS PRN
Status: DISCONTINUED | OUTPATIENT
Start: 2019-06-13 | End: 2019-06-13 | Stop reason: SURG

## 2019-06-13 RX ORDER — HYDRALAZINE HYDROCHLORIDE 20 MG/ML
5 INJECTION INTRAMUSCULAR; INTRAVENOUS
Status: DISCONTINUED | OUTPATIENT
Start: 2019-06-13 | End: 2019-06-13 | Stop reason: HOSPADM

## 2019-06-13 RX ORDER — BISACODYL 10 MG
10 SUPPOSITORY, RECTAL RECTAL
Status: DISCONTINUED | OUTPATIENT
Start: 2019-06-13 | End: 2019-06-17

## 2019-06-13 RX ORDER — KETOROLAC TROMETHAMINE 30 MG/ML
30 INJECTION, SOLUTION INTRAMUSCULAR; INTRAVENOUS EVERY 6 HOURS
Status: COMPLETED | OUTPATIENT
Start: 2019-06-13 | End: 2019-06-16

## 2019-06-13 RX ORDER — DEXAMETHASONE SODIUM PHOSPHATE 4 MG/ML
INJECTION, SOLUTION INTRA-ARTICULAR; INTRALESIONAL; INTRAMUSCULAR; INTRAVENOUS; SOFT TISSUE PRN
Status: DISCONTINUED | OUTPATIENT
Start: 2019-06-13 | End: 2019-06-13 | Stop reason: SURG

## 2019-06-13 RX ORDER — HYDROMORPHONE HYDROCHLORIDE 1 MG/ML
0.1 INJECTION, SOLUTION INTRAMUSCULAR; INTRAVENOUS; SUBCUTANEOUS
Status: DISCONTINUED | OUTPATIENT
Start: 2019-06-13 | End: 2019-06-13 | Stop reason: HOSPADM

## 2019-06-13 RX ORDER — HALOPERIDOL 5 MG/ML
1 INJECTION INTRAMUSCULAR
Status: DISCONTINUED | OUTPATIENT
Start: 2019-06-13 | End: 2019-06-13 | Stop reason: HOSPADM

## 2019-06-13 RX ORDER — LORAZEPAM 2 MG/ML
0.5 INJECTION INTRAMUSCULAR
Status: DISCONTINUED | OUTPATIENT
Start: 2019-06-13 | End: 2019-06-13 | Stop reason: HOSPADM

## 2019-06-13 RX ORDER — KETAMINE HYDROCHLORIDE 50 MG/ML
INJECTION, SOLUTION INTRAMUSCULAR; INTRAVENOUS PRN
Status: DISCONTINUED | OUTPATIENT
Start: 2019-06-13 | End: 2019-06-13 | Stop reason: SURG

## 2019-06-13 RX ORDER — ONDANSETRON 2 MG/ML
4 INJECTION INTRAMUSCULAR; INTRAVENOUS
Status: DISCONTINUED | OUTPATIENT
Start: 2019-06-13 | End: 2019-06-13 | Stop reason: HOSPADM

## 2019-06-13 RX ORDER — OXYCODONE HCL 5 MG/5 ML
10 SOLUTION, ORAL ORAL
Status: DISCONTINUED | OUTPATIENT
Start: 2019-06-13 | End: 2019-06-13 | Stop reason: HOSPADM

## 2019-06-13 RX ORDER — ONDANSETRON 2 MG/ML
INJECTION INTRAMUSCULAR; INTRAVENOUS PRN
Status: DISCONTINUED | OUTPATIENT
Start: 2019-06-13 | End: 2019-06-13 | Stop reason: SURG

## 2019-06-13 RX ORDER — ACETAMINOPHEN 500 MG
1000 TABLET ORAL ONCE
Status: COMPLETED | OUTPATIENT
Start: 2019-06-13 | End: 2019-06-13

## 2019-06-13 RX ORDER — HYDROMORPHONE HYDROCHLORIDE 1 MG/ML
0.2 INJECTION, SOLUTION INTRAMUSCULAR; INTRAVENOUS; SUBCUTANEOUS
Status: DISCONTINUED | OUTPATIENT
Start: 2019-06-13 | End: 2019-06-13 | Stop reason: HOSPADM

## 2019-06-13 RX ADMIN — FENTANYL CITRATE 50 MCG: 50 INJECTION, SOLUTION INTRAMUSCULAR; INTRAVENOUS at 17:27

## 2019-06-13 RX ADMIN — DEXAMETHASONE SODIUM PHOSPHATE 2 MG: 4 INJECTION, SOLUTION INTRA-ARTICULAR; INTRALESIONAL; INTRAMUSCULAR; INTRAVENOUS; SOFT TISSUE at 16:21

## 2019-06-13 RX ADMIN — SUCCINYLCHOLINE CHLORIDE 85 MG: 20 INJECTION, SOLUTION INTRAMUSCULAR; INTRAVENOUS at 16:36

## 2019-06-13 RX ADMIN — ONDANSETRON 4 MG: 2 INJECTION INTRAMUSCULAR; INTRAVENOUS at 17:30

## 2019-06-13 RX ADMIN — KETAMINE HYDROCHLORIDE 75 MG: 50 INJECTION, SOLUTION INTRAMUSCULAR; INTRAVENOUS at 16:46

## 2019-06-13 RX ADMIN — BUPIVACAINE HYDROCHLORIDE 30 ML: 2.5 INJECTION, SOLUTION EPIDURAL; INFILTRATION; INTRACAUDAL; PERINEURAL at 16:24

## 2019-06-13 RX ADMIN — MORPHINE SULFATE 4 MG: 10 INJECTION INTRAVENOUS at 20:21

## 2019-06-13 RX ADMIN — DEXAMETHASONE SODIUM PHOSPHATE 2 MG: 4 INJECTION, SOLUTION INTRA-ARTICULAR; INTRALESIONAL; INTRAMUSCULAR; INTRAVENOUS; SOFT TISSUE at 16:24

## 2019-06-13 RX ADMIN — ROCURONIUM BROMIDE 25 MG: 10 INJECTION, SOLUTION INTRAVENOUS at 16:40

## 2019-06-13 RX ADMIN — SODIUM CHLORIDE, POTASSIUM CHLORIDE, SODIUM LACTATE AND CALCIUM CHLORIDE: 600; 310; 30; 20 INJECTION, SOLUTION INTRAVENOUS at 16:32

## 2019-06-13 RX ADMIN — ACETAMINOPHEN 1000 MG: 500 TABLET, FILM COATED ORAL at 13:58

## 2019-06-13 RX ADMIN — MAGNESIUM SULFATE IN WATER 4 G: 40 INJECTION, SOLUTION INTRAVENOUS at 16:45

## 2019-06-13 RX ADMIN — FENTANYL CITRATE 50 MCG: 50 INJECTION, SOLUTION INTRAMUSCULAR; INTRAVENOUS at 17:32

## 2019-06-13 RX ADMIN — DEXAMETHASONE SODIUM PHOSPHATE 10 MG: 4 INJECTION, SOLUTION INTRA-ARTICULAR; INTRALESIONAL; INTRAMUSCULAR; INTRAVENOUS; SOFT TISSUE at 16:40

## 2019-06-13 RX ADMIN — FAMOTIDINE 20 MG: 10 INJECTION INTRAVENOUS at 20:49

## 2019-06-13 RX ADMIN — FENTANYL CITRATE 25 MCG: 50 INJECTION INTRAMUSCULAR; INTRAVENOUS at 18:38

## 2019-06-13 RX ADMIN — BUPIVACAINE HYDROCHLORIDE 30 ML: 2.5 INJECTION, SOLUTION EPIDURAL; INFILTRATION; INTRACAUDAL; PERINEURAL at 16:21

## 2019-06-13 RX ADMIN — HYDROMORPHONE HYDROCHLORIDE 0.4 MG: 1 INJECTION, SOLUTION INTRAMUSCULAR; INTRAVENOUS; SUBCUTANEOUS at 19:38

## 2019-06-13 RX ADMIN — LIDOCAINE HYDROCHLORIDE 100 MG: 20 INJECTION, SOLUTION INTRAVENOUS at 16:34

## 2019-06-13 RX ADMIN — CELECOXIB 200 MG: 200 CAPSULE ORAL at 13:59

## 2019-06-13 RX ADMIN — SODIUM CHLORIDE, POTASSIUM CHLORIDE, SODIUM LACTATE AND CALCIUM CHLORIDE: 600; 310; 30; 20 INJECTION, SOLUTION INTRAVENOUS at 19:28

## 2019-06-13 RX ADMIN — CEFOTETAN DISODIUM 2 G: 2 INJECTION, POWDER, FOR SOLUTION INTRAMUSCULAR; INTRAVENOUS at 16:45

## 2019-06-13 RX ADMIN — FENTANYL CITRATE 25 MCG: 50 INJECTION INTRAMUSCULAR; INTRAVENOUS at 19:09

## 2019-06-13 RX ADMIN — MIDAZOLAM HYDROCHLORIDE 2 MG: 1 INJECTION, SOLUTION INTRAMUSCULAR; INTRAVENOUS at 16:20

## 2019-06-13 RX ADMIN — SODIUM CHLORIDE, POTASSIUM CHLORIDE, SODIUM LACTATE AND CALCIUM CHLORIDE: 600; 310; 30; 20 INJECTION, SOLUTION INTRAVENOUS at 14:10

## 2019-06-13 RX ADMIN — KETOROLAC TROMETHAMINE 30 MG: 30 INJECTION, SOLUTION INTRAMUSCULAR at 20:50

## 2019-06-13 RX ADMIN — PROPOFOL 150 MG: 10 INJECTION, EMULSION INTRAVENOUS at 16:36

## 2019-06-13 RX ADMIN — GABAPENTIN 300 MG: 300 CAPSULE ORAL at 13:59

## 2019-06-13 RX ADMIN — FENTANYL CITRATE 25 MCG: 50 INJECTION INTRAMUSCULAR; INTRAVENOUS at 18:44

## 2019-06-13 RX ADMIN — MORPHINE SULFATE 4 MG: 10 INJECTION INTRAVENOUS at 23:49

## 2019-06-13 ASSESSMENT — COGNITIVE AND FUNCTIONAL STATUS - GENERAL
DAILY ACTIVITIY SCORE: 20
WALKING IN HOSPITAL ROOM: A LITTLE
DRESSING REGULAR LOWER BODY CLOTHING: A LITTLE
MOBILITY SCORE: 20
SUGGESTED CMS G CODE MODIFIER DAILY ACTIVITY: CJ
DRESSING REGULAR UPPER BODY CLOTHING: A LITTLE
STANDING UP FROM CHAIR USING ARMS: A LITTLE
MOVING FROM LYING ON BACK TO SITTING ON SIDE OF FLAT BED: A LITTLE
MOVING TO AND FROM BED TO CHAIR: A LITTLE
HELP NEEDED FOR BATHING: A LITTLE
SUGGESTED CMS G CODE MODIFIER MOBILITY: CJ
PERSONAL GROOMING: A LITTLE

## 2019-06-13 ASSESSMENT — LIFESTYLE VARIABLES
EVER_SMOKED: NEVER
EVER_SMOKED: NEVER

## 2019-06-13 ASSESSMENT — COPD QUESTIONNAIRES
DURING THE PAST 4 WEEKS HOW MUCH DID YOU FEEL SHORT OF BREATH: NONE/LITTLE OF THE TIME
DO YOU EVER COUGH UP ANY MUCUS OR PHLEGM?: NO/ONLY WITH OCCASIONAL COLDS OR INFECTIONS
COPD SCREENING SCORE: 2
HAVE YOU SMOKED AT LEAST 100 CIGARETTES IN YOUR ENTIRE LIFE: NO/DON'T KNOW

## 2019-06-13 NOTE — ANESTHESIA PREPROCEDURE EVALUATION
Relevant Problems   (+) Diverticulitis of colon with perforation       Physical Exam    Airway   Mallampati: II  TM distance: >3 FB  Neck ROM: full       Cardiovascular - normal exam  Rhythm: regular  Rate: normal  (-) murmur     Dental - normal exam         Pulmonary - normal exam  Breath sounds clear to auscultation     Abdominal    Neurological - normal exam                 Anesthesia Plan    ASA 2       Plan - general and peripheral nerve block     Peripheral nerve block will be post-op pain control  Airway plan will be ETT        Induction: intravenous    Postoperative Plan: Postoperative administration of opioids is intended.    Pertinent diagnostic labs and testing reviewed    Informed Consent:    Anesthetic plan and risks discussed with patient.    Use of blood products discussed with: patient whom consented to blood products.

## 2019-06-13 NOTE — ANESTHESIA PROCEDURE NOTES
Airway  Date/Time: 6/13/2019 4:37 PM  Performed by: KARTHIK VERGARA  Authorized by: KARTHIK VERGARA     Location:  OR  Urgency:  Elective  Indications for Airway Management:  Anesthesia  Spontaneous Ventilation: absent    Sedation Level:  Deep  Preoxygenated: Yes    Patient Position:  Sniffing  Mask Difficulty Assessment:  0 - not attempted  Final Airway Type:  Endotracheal airway  Final Endotracheal Airway:  ETT  Cuffed: Yes    Technique Used for Successful ETT Placement:  Direct laryngoscopy  Insertion Site:  Oral  Blade Type:  Adolfo  Laryngoscope Blade/Videolaryngoscope Blade Size:  4  ETT Size (mm):  7.5  Measured from:  Teeth  ETT to Teeth (cm):  23  Placement Verified by: auscultation and capnometry    Cormack-Lehane Classification:  Grade IIb - view of arytenoids or posterior of glottis only  Number of Attempts at Approach:  1

## 2019-06-13 NOTE — ANESTHESIA PROCEDURE NOTES
Peripheral Block  Performed by: KARTHIK VERGARA  Authorized by: KARTHIK VERGARA     Patient Location:  Pre-op  Start Time:  6/13/2019 4:20 PM  End Time:  6/13/2019 4:25 PM  Reason for Block: at surgeon's request and post-op pain management    patient identified, IV checked, site marked, risks and benefits discussed, surgical consent, monitors and equipment checked, pre-op evaluation and timeout performed    Patient Position:  Supine  Prep: ChloraPrep    Monitoring:  Heart rate, continuous pulse ox and cardiac monitor  Block Region:  Trunk  Trunk - Block Type:  Multiple level INTERCOSTAL block using XOCHITL approach    Laterality:  Bilateral  Procedures: ultrasound guided  Image captured, interpreted and electronically stored.  Local Infiltration:  Lidocaine  Strength:  2 %  Dose:  3 ml  Block Type:  Single-shot  Needle Length:  100mm  Needle Gauge:  21 G  Needle Localization:  Ultrasound guidance  Injection Assessment:  Negative aspiration for heme, no paresthesia on injection, incremental injection and local visualized surrounding nerve on ultrasound  Evidence of intravascular injection: No

## 2019-06-14 PROBLEM — Z93.3 COLOSTOMY IN PLACE (HCC): Status: ACTIVE | Noted: 2019-06-14

## 2019-06-14 LAB
ALBUMIN SERPL BCP-MCNC: 3.9 G/DL (ref 3.2–4.9)
ALBUMIN/GLOB SERPL: 1.4 G/DL
ALP SERPL-CCNC: 101 U/L (ref 30–99)
ALT SERPL-CCNC: 43 U/L (ref 2–50)
ANION GAP SERPL CALC-SCNC: 10 MMOL/L (ref 0–11.9)
ANION GAP SERPL CALC-SCNC: 12 MMOL/L (ref 0–11.9)
AST SERPL-CCNC: 17 U/L (ref 12–45)
BASOPHILS # BLD AUTO: 0.3 % (ref 0–1.8)
BASOPHILS # BLD: 0.09 K/UL (ref 0–0.12)
BILIRUB SERPL-MCNC: 0.7 MG/DL (ref 0.1–1.5)
BUN SERPL-MCNC: 40 MG/DL (ref 8–22)
BUN SERPL-MCNC: 41 MG/DL (ref 8–22)
CALCIUM SERPL-MCNC: 9.1 MG/DL (ref 8.5–10.5)
CALCIUM SERPL-MCNC: 9.2 MG/DL (ref 8.5–10.5)
CHLORIDE SERPL-SCNC: 104 MMOL/L (ref 96–112)
CHLORIDE SERPL-SCNC: 105 MMOL/L (ref 96–112)
CO2 SERPL-SCNC: 18 MMOL/L (ref 20–33)
CO2 SERPL-SCNC: 18 MMOL/L (ref 20–33)
CREAT SERPL-MCNC: 1.23 MG/DL (ref 0.5–1.4)
CREAT SERPL-MCNC: 1.32 MG/DL (ref 0.5–1.4)
EOSINOPHIL # BLD AUTO: 0.01 K/UL (ref 0–0.51)
EOSINOPHIL NFR BLD: 0 % (ref 0–6.9)
ERYTHROCYTE [DISTWIDTH] IN BLOOD BY AUTOMATED COUNT: 49.2 FL (ref 35.9–50)
GLOBULIN SER CALC-MCNC: 2.8 G/DL (ref 1.9–3.5)
GLUCOSE SERPL-MCNC: 170 MG/DL (ref 65–99)
GLUCOSE SERPL-MCNC: 175 MG/DL (ref 65–99)
HCT VFR BLD AUTO: 37.1 % (ref 42–52)
HGB BLD-MCNC: 12 G/DL (ref 14–18)
IMM GRANULOCYTES # BLD AUTO: 0.6 K/UL (ref 0–0.11)
IMM GRANULOCYTES NFR BLD AUTO: 1.8 % (ref 0–0.9)
LYMPHOCYTES # BLD AUTO: 1.31 K/UL (ref 1–4.8)
LYMPHOCYTES NFR BLD: 3.9 % (ref 22–41)
MCH RBC QN AUTO: 27.9 PG (ref 27–33)
MCHC RBC AUTO-ENTMCNC: 32.3 G/DL (ref 33.7–35.3)
MCV RBC AUTO: 86.3 FL (ref 81.4–97.8)
MONOCYTES # BLD AUTO: 1.52 K/UL (ref 0–0.85)
MONOCYTES NFR BLD AUTO: 4.6 % (ref 0–13.4)
NEUTROPHILS # BLD AUTO: 29.8 K/UL (ref 1.82–7.42)
NEUTROPHILS NFR BLD: 89.4 % (ref 44–72)
NRBC # BLD AUTO: 0 K/UL
NRBC BLD-RTO: 0 /100 WBC
PATHOLOGY CONSULT NOTE: NORMAL
PLATELET # BLD AUTO: 445 K/UL (ref 164–446)
PMV BLD AUTO: 10.6 FL (ref 9–12.9)
POTASSIUM SERPL-SCNC: 6 MMOL/L (ref 3.6–5.5)
POTASSIUM SERPL-SCNC: 6.2 MMOL/L (ref 3.6–5.5)
PROT SERPL-MCNC: 6.7 G/DL (ref 6–8.2)
RBC # BLD AUTO: 4.3 M/UL (ref 4.7–6.1)
SODIUM SERPL-SCNC: 133 MMOL/L (ref 135–145)
SODIUM SERPL-SCNC: 134 MMOL/L (ref 135–145)
WBC # BLD AUTO: 34.4 K/UL (ref 4.8–10.8)

## 2019-06-14 PROCEDURE — 36415 COLL VENOUS BLD VENIPUNCTURE: CPT

## 2019-06-14 PROCEDURE — A9270 NON-COVERED ITEM OR SERVICE: HCPCS | Performed by: SURGERY

## 2019-06-14 PROCEDURE — 80053 COMPREHEN METABOLIC PANEL: CPT

## 2019-06-14 PROCEDURE — 700102 HCHG RX REV CODE 250 W/ 637 OVERRIDE(OP): Performed by: SURGERY

## 2019-06-14 PROCEDURE — 700105 HCHG RX REV CODE 258: Performed by: SURGERY

## 2019-06-14 PROCEDURE — A9270 NON-COVERED ITEM OR SERVICE: HCPCS | Performed by: NURSE PRACTITIONER

## 2019-06-14 PROCEDURE — 700102 HCHG RX REV CODE 250 W/ 637 OVERRIDE(OP): Performed by: NURSE PRACTITIONER

## 2019-06-14 PROCEDURE — 700111 HCHG RX REV CODE 636 W/ 250 OVERRIDE (IP): Performed by: SURGERY

## 2019-06-14 PROCEDURE — 770006 HCHG ROOM/CARE - MED/SURG/GYN SEMI*

## 2019-06-14 PROCEDURE — 700112 HCHG RX REV CODE 229: Performed by: SURGERY

## 2019-06-14 PROCEDURE — 80048 BASIC METABOLIC PNL TOTAL CA: CPT

## 2019-06-14 PROCEDURE — 94667 MNPJ CHEST WALL 1ST: CPT

## 2019-06-14 PROCEDURE — 700105 HCHG RX REV CODE 258: Performed by: NURSE PRACTITIONER

## 2019-06-14 PROCEDURE — 85025 COMPLETE CBC W/AUTO DIFF WBC: CPT

## 2019-06-14 RX ORDER — SODIUM CHLORIDE 9 MG/ML
1000 INJECTION, SOLUTION INTRAVENOUS ONCE
Status: DISCONTINUED | OUTPATIENT
Start: 2019-06-14 | End: 2019-06-14

## 2019-06-14 RX ORDER — SODIUM CHLORIDE 9 MG/ML
INJECTION, SOLUTION INTRAVENOUS CONTINUOUS
Status: DISCONTINUED | OUTPATIENT
Start: 2019-06-14 | End: 2019-06-20

## 2019-06-14 RX ORDER — SODIUM CHLORIDE 9 MG/ML
1000 INJECTION, SOLUTION INTRAVENOUS ONCE
Status: COMPLETED | OUTPATIENT
Start: 2019-06-14 | End: 2019-06-14

## 2019-06-14 RX ORDER — OXYCODONE HYDROCHLORIDE 5 MG/1
5 TABLET ORAL EVERY 4 HOURS PRN
Status: DISCONTINUED | OUTPATIENT
Start: 2019-06-14 | End: 2019-06-21

## 2019-06-14 RX ADMIN — ENOXAPARIN SODIUM 40 MG: 100 INJECTION SUBCUTANEOUS at 06:27

## 2019-06-14 RX ADMIN — FAMOTIDINE 20 MG: 10 INJECTION INTRAVENOUS at 17:30

## 2019-06-14 RX ADMIN — SODIUM CHLORIDE: 9 INJECTION, SOLUTION INTRAVENOUS at 20:30

## 2019-06-14 RX ADMIN — SENNOSIDES,DOCUSATE SODIUM 1 TABLET: 8.6; 5 TABLET, FILM COATED ORAL at 20:27

## 2019-06-14 RX ADMIN — DOCUSATE SODIUM 100 MG: 100 CAPSULE, LIQUID FILLED ORAL at 17:30

## 2019-06-14 RX ADMIN — KETOROLAC TROMETHAMINE 30 MG: 30 INJECTION, SOLUTION INTRAMUSCULAR at 13:30

## 2019-06-14 RX ADMIN — SODIUM CHLORIDE: 9 INJECTION, SOLUTION INTRAVENOUS at 06:43

## 2019-06-14 RX ADMIN — KETOROLAC TROMETHAMINE 30 MG: 30 INJECTION, SOLUTION INTRAMUSCULAR at 08:55

## 2019-06-14 RX ADMIN — OXYCODONE HYDROCHLORIDE 5 MG: 5 TABLET ORAL at 15:52

## 2019-06-14 RX ADMIN — MORPHINE SULFATE 4 MG: 10 INJECTION INTRAVENOUS at 13:29

## 2019-06-14 RX ADMIN — MORPHINE SULFATE 4 MG: 10 INJECTION INTRAVENOUS at 21:25

## 2019-06-14 RX ADMIN — FAMOTIDINE 20 MG: 10 INJECTION INTRAVENOUS at 06:27

## 2019-06-14 RX ADMIN — MORPHINE SULFATE 4 MG: 10 INJECTION INTRAVENOUS at 22:59

## 2019-06-14 RX ADMIN — KETOROLAC TROMETHAMINE 30 MG: 30 INJECTION, SOLUTION INTRAMUSCULAR at 20:28

## 2019-06-14 RX ADMIN — SODIUM CHLORIDE 1000 ML: 9 INJECTION, SOLUTION INTRAVENOUS at 07:45

## 2019-06-14 RX ADMIN — KETOROLAC TROMETHAMINE 30 MG: 30 INJECTION, SOLUTION INTRAMUSCULAR at 01:35

## 2019-06-14 RX ADMIN — OXYCODONE HYDROCHLORIDE 5 MG: 5 TABLET ORAL at 10:45

## 2019-06-14 RX ADMIN — OXYCODONE HYDROCHLORIDE 5 MG: 5 TABLET ORAL at 20:28

## 2019-06-14 RX ADMIN — MORPHINE SULFATE 4 MG: 10 INJECTION INTRAVENOUS at 08:55

## 2019-06-14 RX ADMIN — SODIUM CHLORIDE: 9 INJECTION, SOLUTION INTRAVENOUS at 10:41

## 2019-06-14 NOTE — PROGRESS NOTES
Lab result of 6.2 potassium. Was instructed to get a STAT repeat BMP and reassess pt.  Pt. Vitals within normal limits. HR at sinus rhythm.

## 2019-06-14 NOTE — PROGRESS NOTES
Surgical Progress Note:    POD #1 S/P Resection and closure of Colostomy  Doing well. Neg N/V, no FLATUS/ no BM, Pain controlled, Denies chest pain or SOB.  Has not Ambulated.     PE:  /72   Pulse 94   Temp 36.8 °C (98.2 °F) (Temporal)   Resp 17   Ht 1.829 m (6')   Wt 82.2 kg (181 lb 3.5 oz)   SpO2 96%   BMI 24.58 kg/m²     I/O:   Intake/Output Summary (Last 24 hours) at 06/14/19 0715  Last data filed at 06/14/19 0400   Gross per 24 hour   Intake             1800 ml   Output              700 ml   Net             1100 ml         Review of Systems   Constitutional: Negative.  Negative for chills and fever.   Respiratory: Negative for shortness of breath.    Cardiovascular: Negative for chest pain.   Gastrointestinal: Negative for nausea and vomiting.        no flatus/no stool   Genitourinary: Negative.      Physical Exam   Constitutional:  appears well-developed.   Neck: Neck supple.   Cardiovascular: Normal rate.    Pulmonary/Chest: Effort normal.   Abdominal: Soft.  exhibits no distension. Appropriate tenderness.   Incisions clean, dry, and intact    Musculoskeletal: Normal range of motion.   Neurological:  alert.   Skin:  Warm and dry.   Extremities: najera, no edema    Labs:  Recent Labs      06/14/19   0310   WBC  34.4*   RBC  4.30*   HEMOGLOBIN  12.0*   HEMATOCRIT  37.1*   MCV  86.3   MCH  27.9   RDW  49.2   PLATELETCT  445   MPV  10.6   NEUTSPOLYS  89.40*   LYMPHOCYTES  3.90*   MONOCYTES  4.60   EOSINOPHILS  0.00   BASOPHILS  0.30     Recent Labs      06/14/19   0310  06/14/19   0518   SODIUM  133*  134*   POTASSIUM  6.2*  6.0*   CHLORIDE  105  104   CO2  18*  18*   GLUCOSE  175*  170*   BUN  40*  41*         A/P:     - NPO, sips with meds.  Awaiting return of GI function  - IS Q One hour while awake  - Ambulate.  Out of bed for all meals please  - Pain controlled.  Cont PO pain medication  - Labs noted, recheck in am  - elevated K, switched iv to NS  - elevated kidney function, bolus NS  - DVT  propholaxis, ok to start Lovenox, sequentials and ambulate  - Adequate urine output.  Cont, to monitor  - IF continues to do well and tolerates PO, will plan for D/C with return of GI function     Colostomy in place (HCC)- (present on admission)   Assessment & Plan    6/13/19  Resection and closure of colostomy         Discussed with Patient, RN and ELIJAH TripathiPGEREMIAS  [facility]  [phone]

## 2019-06-14 NOTE — PROGRESS NOTES
Dicussed lab results WBC 34.4, Potassium 6.0, and output for the night with MD. MD changed continuous fluids from Lactated Ringers to .9% NS to run at 100mL/hr.

## 2019-06-14 NOTE — ANESTHESIA POSTPROCEDURE EVALUATION
Patient: Syed Ladd    Procedure Summary     Date:  06/13/19 Room / Location:  [place] [address] / SURGERY [facility]    Anesthesia Start:  1632 Anesthesia Stop:  1746    Procedure:  CLOSURE, COLOSTOMY (Abdomen) Diagnosis:  (DIVERTICULITIS )    Surgeon:  Dianna Napoles M.D. Responsible Provider:  Blossom Smith M.D.    Anesthesia Type:  general, peripheral nerve block ASA Status:  2          Final Anesthesia Type: general, peripheral nerve block  Last vitals  BP   Blood Pressure: 115/75, NIBP: 139/84    Temp   36.4 °C (97.6 °F)    Pulse   Pulse: 88, Heart Rate (Monitored): 82   Resp   18    SpO2   97 %      Anesthesia Post Evaluation    Patient location during evaluation: PACU  Patient participation: complete - patient participated  Level of consciousness: awake and alert    Airway patency: patent  Anesthetic complications: no  Cardiovascular status: hemodynamically stable  Respiratory status: acceptable  Hydration status: euvolemic    PONV: none           Nurse Pain Score: 4 (NPRS)

## 2019-06-14 NOTE — ASSESSMENT & PLAN NOTE
6/13 Resection and closure of colostomy.  6/20 Explored for bowel obstruction and infected hematoma.  Anastomosis revised.  6/25 Oral diet initiated.

## 2019-06-14 NOTE — PROGRESS NOTES
Bedside report received.  Assessment complete.  A&O x 4. Patient calls appropriately.  Patient up with no assist.    Patient has 8/10 pain. Medication given (see MAR)  Denies N&V. Currently NPO strict .  Abdominal incision covered with dressing, CDI.   - void, - flatus, - BM.  Patient denies SOB.  Review plan with of care with patient. Call light and personal belongings with in reach. Hourly rounding in place. All needs met at this time.

## 2019-06-14 NOTE — CARE PLAN
Problem: Communication  Goal: The ability to communicate needs accurately and effectively will improve    Intervention: Educate patient and significant other/support system about the plan of care, procedures, treatments, medications and allow for questions  Plan of care discussed with pt.      Problem: Infection  Goal: Will remain free from infection    Intervention: Assess signs and symptoms of infection  Monitoring labs, vitals and pt. For signs of an infection.

## 2019-06-14 NOTE — ANESTHESIA TIME REPORT
Anesthesia Start and Stop Event Times     Date Time Event    6/13/2019 1632 Anesthesia Start     1746 Anesthesia Stop        Responsible Staff  06/13/19    Name Role Begin End    Blossom Smith M.D. Anesth 1632 1746        Preop Diagnosis (Free Text):  Pre-op Diagnosis     DIVERTICULITIS         Preop Diagnosis (Codes):  Diagnosis Information     Diagnosis Code(s):         Post op Diagnosis  History of creation of ostomy (HCC)      Premium Reason  A. 3PM - 7AM    Comments:

## 2019-06-14 NOTE — PROGRESS NOTES
Report received.  Assessment complete.  A&O x 4. Patient calls appropriately.  Patient ambulatory with SB assist.   Patient has 8/10 pain. Medicated per MAR  Denies N&V.Pt. NPO.  Surgical sites covered in dressing CDI.  potter void, - flatus, pta BM.  Patient denies SOB.  Review plan with of care with patient. Call light and personal belongings with in reach. Hourly rounding in place. All needs met at this time.

## 2019-06-14 NOTE — OP REPORT
DATE OF SERVICE:  06/13/2019    PREOPERATIVE DIAGNOSIS:  History of perforated diverticulitis with ostomy.    POSTOPERATIVE DIAGNOSIS:  History of perforated diverticulitis with ostomy.    PROCEDURE:  Colostomy closure.    SURGEON:  Dianna Napoles MD    ASSISTANT:  BRYANNA Arceo    ANESTHESIA:  General endotracheal.    ANESTHESIOLOGIST:  Blossom Smith MD    INDICATIONS:  The patient is a 59-year-old gentleman who presented 6 months   ago with perforated diverticulitis.  He subsequently was septic and required   end colostomy.  He is being brought now to the operating room for ostomy   closure.    FINDINGS:  An end-to-end colocolostomy was performed.  There were minimal   adhesions to the abdominal cavity.    DESCRIPTION OF PROCEDURE:  After patient was identified and consented, he was   brought to the operating room and placed in supine position.  Patient   underwent laryngeal mask anesthetic clearance.  The patient's abdomen was   prepped and draped in sterile fashion.  He had a tap block placed prior to   surgery.  His midline incision was reopened.  Upon entering the abdominal   cavity, there were minimal adhesions.  This rectal stump was found and was   mobilized.  The ostomy was then brought back into the abdomen and then   end-to-end colocolostomy was performed with 3-0 silks for the serosal   anastomosis and 3-0 Vicryl in a running fashion for the mucosal anastomosis.    Once that was completed, a rigid sigmoidoscopy was performed, which   demonstrated no evidence of an air leak.  The abdomen was then washed out.    Seprafilm was placed.  Incision was closed with running 0 Vicryl for the   fascia.  Skin was closed with staples.  Prior to closure, the ostomy site was   closed with interrupted #1 Vicryl internally and externally.  Once this was   completed, the skin was closed with staples.  Sterile dressing was placed on   the wounds.  Patient was extubated and taken to recovery in stable  condition.    All sponge and needle counts were correct.       ____________________________________     MD PRITI WRIGHT / BORIS    DD:  06/13/2019 17:38:28  DT:  06/13/2019 19:41:40    D#:  [id number]  Job#:  [id number]    cc: Kassie Lilly MD, Blossom Smith MD

## 2019-06-14 NOTE — PROGRESS NOTES
2 RN skin check completed.   Pt. Has areas of gout bulidup on elbows and knees  Heels blanchable.  Stat lock on left thigh.  Trunk area covered in dressings CDI   All other bony prominences intact.

## 2019-06-14 NOTE — ANESTHESIA QCDR
2019 [facility] Clinical Data Registry (for Quality Improvement)     Postoperative nausea/vomiting risk protocol (Adult = 18 yrs and Pediatric 3-17 yrs)- (430 and 463)  General inhalation anesthetic (NOT TIVA) with PONV risk factors: Yes  Provision of anti-emetic therapy with at least 2 different classes of agents: Yes   Patient DID NOT receive anti-emetic therapy and reason is documented in Medical Record:  N/A    Multimodal Pain Management- (AQI59)  Patient undergoing Elective Surgery (i.e. Outpatient, or ASC, or Prescheduled Surgery prior to Hospital Admission): Yes  Use of Multimodal Pain Management, two or more drugs and/or interventions, NOT including systemic opioids: Yes   Exception: Documented allergy to multiple classes of analgesics:  N/A    PACU assessment of acute postoperative pain prior to Anesthesia Care End- Applies to Patients Age = 18- (ABG7)  Initial PACU pain score is which of the following: < 7/10  Patient unable to report pain score: N/A    Post-anesthetic transfer of care checklist/protocol to PACU/ICU- (426 and 427)  Upon conclusion of case, patient transferred to which of the following locations: PACU/Non-ICU  Use of transfer checklist/protocol: Yes  Exclusion: Service Performed in Patient Hospital Room (and thus did not require transfer): N/A    PACU Reintubation- (AQI31)  General anesthesia requiring endotracheal intubation (ETT) along with subsequent extubation in OR or PACU: Yes  Required reintubation in the PACU: No   Extubation was a planned trial documented in the medical record prior to removal of the original airway device:  N/A    Unplanned admission to ICU related to anesthesia service up through end of PACU care- (MD51)  Unplanned admission to ICU (not initially anticipated at anesthesia start time): No

## 2019-06-15 LAB
ANION GAP SERPL CALC-SCNC: 7 MMOL/L (ref 0–11.9)
BASOPHILS # BLD AUTO: 0.1 % (ref 0–1.8)
BASOPHILS # BLD AUTO: 0.1 % (ref 0–1.8)
BASOPHILS # BLD: 0.01 K/UL (ref 0–0.12)
BASOPHILS # BLD: 0.02 K/UL (ref 0–0.12)
BUN SERPL-MCNC: 36 MG/DL (ref 8–22)
CALCIUM SERPL-MCNC: 8.8 MG/DL (ref 8.5–10.5)
CHLORIDE SERPL-SCNC: 104 MMOL/L (ref 96–112)
CO2 SERPL-SCNC: 22 MMOL/L (ref 20–33)
CREAT SERPL-MCNC: 1.16 MG/DL (ref 0.5–1.4)
EOSINOPHIL # BLD AUTO: 0 K/UL (ref 0–0.51)
EOSINOPHIL # BLD AUTO: 0.01 K/UL (ref 0–0.51)
EOSINOPHIL NFR BLD: 0 % (ref 0–6.9)
EOSINOPHIL NFR BLD: 0.1 % (ref 0–6.9)
ERYTHROCYTE [DISTWIDTH] IN BLOOD BY AUTOMATED COUNT: 50.4 FL (ref 35.9–50)
ERYTHROCYTE [DISTWIDTH] IN BLOOD BY AUTOMATED COUNT: 51 FL (ref 35.9–50)
GLUCOSE SERPL-MCNC: 132 MG/DL (ref 65–99)
HCT VFR BLD AUTO: 24.6 % (ref 42–52)
HCT VFR BLD AUTO: 25.1 % (ref 42–52)
HGB BLD-MCNC: 7.8 G/DL (ref 14–18)
HGB BLD-MCNC: 8 G/DL (ref 14–18)
IMM GRANULOCYTES # BLD AUTO: 0.06 K/UL (ref 0–0.11)
IMM GRANULOCYTES # BLD AUTO: 0.1 K/UL (ref 0–0.11)
IMM GRANULOCYTES NFR BLD AUTO: 0.6 % (ref 0–0.9)
IMM GRANULOCYTES NFR BLD AUTO: 0.7 % (ref 0–0.9)
LYMPHOCYTES # BLD AUTO: 1.29 K/UL (ref 1–4.8)
LYMPHOCYTES # BLD AUTO: 1.52 K/UL (ref 1–4.8)
LYMPHOCYTES NFR BLD: 11.3 % (ref 22–41)
LYMPHOCYTES NFR BLD: 13.2 % (ref 22–41)
MCH RBC QN AUTO: 27.4 PG (ref 27–33)
MCH RBC QN AUTO: 27.4 PG (ref 27–33)
MCHC RBC AUTO-ENTMCNC: 31.1 G/DL (ref 33.7–35.3)
MCHC RBC AUTO-ENTMCNC: 31.7 G/DL (ref 33.7–35.3)
MCV RBC AUTO: 86.3 FL (ref 81.4–97.8)
MCV RBC AUTO: 88.1 FL (ref 81.4–97.8)
MONOCYTES # BLD AUTO: 0.71 K/UL (ref 0–0.85)
MONOCYTES # BLD AUTO: 1.2 K/UL (ref 0–0.85)
MONOCYTES NFR BLD AUTO: 7.3 % (ref 0–13.4)
MONOCYTES NFR BLD AUTO: 8.9 % (ref 0–13.4)
NEUTROPHILS # BLD AUTO: 10.67 K/UL (ref 1.82–7.42)
NEUTROPHILS # BLD AUTO: 7.68 K/UL (ref 1.82–7.42)
NEUTROPHILS NFR BLD: 78.7 % (ref 44–72)
NEUTROPHILS NFR BLD: 79 % (ref 44–72)
NRBC # BLD AUTO: 0 K/UL
NRBC # BLD AUTO: 0 K/UL
NRBC BLD-RTO: 0 /100 WBC
NRBC BLD-RTO: 0 /100 WBC
PLATELET # BLD AUTO: 191 K/UL (ref 164–446)
PLATELET # BLD AUTO: 231 K/UL (ref 164–446)
PMV BLD AUTO: 10.3 FL (ref 9–12.9)
PMV BLD AUTO: 10.7 FL (ref 9–12.9)
POTASSIUM SERPL-SCNC: 4.8 MMOL/L (ref 3.6–5.5)
RBC # BLD AUTO: 2.85 M/UL (ref 4.7–6.1)
RBC # BLD AUTO: 2.85 M/UL (ref 4.7–6.1)
SODIUM SERPL-SCNC: 133 MMOL/L (ref 135–145)
WBC # BLD AUTO: 13.5 K/UL (ref 4.8–10.8)
WBC # BLD AUTO: 9.8 K/UL (ref 4.8–10.8)

## 2019-06-15 PROCEDURE — 94668 MNPJ CHEST WALL SBSQ: CPT

## 2019-06-15 PROCEDURE — 36415 COLL VENOUS BLD VENIPUNCTURE: CPT

## 2019-06-15 PROCEDURE — 700102 HCHG RX REV CODE 250 W/ 637 OVERRIDE(OP): Performed by: NURSE PRACTITIONER

## 2019-06-15 PROCEDURE — 700105 HCHG RX REV CODE 258: Performed by: SURGERY

## 2019-06-15 PROCEDURE — 80048 BASIC METABOLIC PNL TOTAL CA: CPT

## 2019-06-15 PROCEDURE — A9270 NON-COVERED ITEM OR SERVICE: HCPCS | Performed by: SURGERY

## 2019-06-15 PROCEDURE — A9270 NON-COVERED ITEM OR SERVICE: HCPCS | Performed by: NURSE PRACTITIONER

## 2019-06-15 PROCEDURE — 85025 COMPLETE CBC W/AUTO DIFF WBC: CPT

## 2019-06-15 PROCEDURE — 700102 HCHG RX REV CODE 250 W/ 637 OVERRIDE(OP): Performed by: SURGERY

## 2019-06-15 PROCEDURE — 700112 HCHG RX REV CODE 229: Performed by: SURGERY

## 2019-06-15 PROCEDURE — 700111 HCHG RX REV CODE 636 W/ 250 OVERRIDE (IP): Performed by: SURGERY

## 2019-06-15 PROCEDURE — 770006 HCHG ROOM/CARE - MED/SURG/GYN SEMI*

## 2019-06-15 RX ORDER — HYDROMORPHONE HYDROCHLORIDE 1 MG/ML
1 INJECTION, SOLUTION INTRAMUSCULAR; INTRAVENOUS; SUBCUTANEOUS
Status: DISCONTINUED | OUTPATIENT
Start: 2019-06-15 | End: 2019-06-17

## 2019-06-15 RX ADMIN — MAGNESIUM HYDROXIDE 30 ML: 400 SUSPENSION ORAL at 05:49

## 2019-06-15 RX ADMIN — SODIUM CHLORIDE: 9 INJECTION, SOLUTION INTRAVENOUS at 18:24

## 2019-06-15 RX ADMIN — OXYCODONE HYDROCHLORIDE 5 MG: 5 TABLET ORAL at 17:48

## 2019-06-15 RX ADMIN — MORPHINE SULFATE 4 MG: 10 INJECTION INTRAVENOUS at 00:30

## 2019-06-15 RX ADMIN — HYDROMORPHONE HYDROCHLORIDE 1 MG: 1 INJECTION, SOLUTION INTRAMUSCULAR; INTRAVENOUS; SUBCUTANEOUS at 02:58

## 2019-06-15 RX ADMIN — KETOROLAC TROMETHAMINE 30 MG: 30 INJECTION, SOLUTION INTRAMUSCULAR at 15:36

## 2019-06-15 RX ADMIN — ENOXAPARIN SODIUM 40 MG: 100 INJECTION SUBCUTANEOUS at 05:49

## 2019-06-15 RX ADMIN — KETOROLAC TROMETHAMINE 30 MG: 30 INJECTION, SOLUTION INTRAMUSCULAR at 20:49

## 2019-06-15 RX ADMIN — DOCUSATE SODIUM 100 MG: 100 CAPSULE, LIQUID FILLED ORAL at 17:47

## 2019-06-15 RX ADMIN — DOCUSATE SODIUM 100 MG: 100 CAPSULE, LIQUID FILLED ORAL at 05:49

## 2019-06-15 RX ADMIN — ONDANSETRON 4 MG: 2 INJECTION INTRAMUSCULAR; INTRAVENOUS at 20:49

## 2019-06-15 RX ADMIN — SODIUM CHLORIDE: 9 INJECTION, SOLUTION INTRAVENOUS at 08:41

## 2019-06-15 RX ADMIN — FAMOTIDINE 20 MG: 10 INJECTION INTRAVENOUS at 17:48

## 2019-06-15 RX ADMIN — KETOROLAC TROMETHAMINE 30 MG: 30 INJECTION, SOLUTION INTRAMUSCULAR at 03:45

## 2019-06-15 RX ADMIN — FAMOTIDINE 20 MG: 10 INJECTION INTRAVENOUS at 05:49

## 2019-06-15 RX ADMIN — SENNOSIDES,DOCUSATE SODIUM 1 TABLET: 8.6; 5 TABLET, FILM COATED ORAL at 20:49

## 2019-06-15 RX ADMIN — HYDROMORPHONE HYDROCHLORIDE 1 MG: 1 INJECTION, SOLUTION INTRAMUSCULAR; INTRAVENOUS; SUBCUTANEOUS at 15:24

## 2019-06-15 RX ADMIN — KETOROLAC TROMETHAMINE 30 MG: 30 INJECTION, SOLUTION INTRAMUSCULAR at 08:45

## 2019-06-15 RX ADMIN — HYDROMORPHONE HYDROCHLORIDE 1 MG: 1 INJECTION, SOLUTION INTRAMUSCULAR; INTRAVENOUS; SUBCUTANEOUS at 09:55

## 2019-06-15 RX ADMIN — HYDROMORPHONE HYDROCHLORIDE 1 MG: 1 INJECTION, SOLUTION INTRAMUSCULAR; INTRAVENOUS; SUBCUTANEOUS at 21:02

## 2019-06-15 NOTE — PROGRESS NOTES
Surgical Progress Note:    POD #2 S/P Resection and closure of Colostomy  Doing well. Neg N/V, no FLATUS/ no BM, Distended.  Increased abdominal pain. Pain controlled, Denies chest pain or SOB.  Ambulating    PE:  BP (!) 95/66   Pulse 91   Temp 37 °C (98.6 °F) (Temporal)   Resp 16   Ht 1.829 m (6')   Wt 82.2 kg (181 lb 3.5 oz)   SpO2 92%   BMI 24.58 kg/m²     I/O:   Intake/Output Summary (Last 24 hours) at 06/14/19 0715  Last data filed at 06/14/19 0400   Gross per 24 hour   Intake             1800 ml   Output              700 ml   Net             1100 ml         Review of Systems   Constitutional: Negative.  Negative for chills and fever.   Respiratory: Negative for shortness of breath.    Cardiovascular: Negative for chest pain.   Gastrointestinal: Negative for nausea and vomiting.        no flatus/no stool   Genitourinary: Negative.      Physical Exam   Constitutional:  appears well-developed.   Neck: Neck supple.   Cardiovascular: Normal rate.    Pulmonary/Chest: Effort normal.   Abdominal: Soft.  exhibits no distension. Appropriate tenderness.   Incisions clean, dry, and intact  Penrose drain in place  Musculoskeletal: Normal range of motion.   Neurological:  alert.   Skin:  Warm and dry.   Extremities: najera, no edema    Labs:  Recent Labs      06/14/19   0310  06/15/19   0439   WBC  34.4*  13.5*   RBC  4.30*  2.85*   HEMOGLOBIN  12.0*  8.0*   HEMATOCRIT  37.1*  25.1*   MCV  86.3  88.1   MCH  27.9  27.4   RDW  49.2  51.0*   PLATELETCT  445  231   MPV  10.6  10.7   NEUTSPOLYS  89.40*  79.00*   LYMPHOCYTES  3.90*  11.30*   MONOCYTES  4.60  8.90   EOSINOPHILS  0.00  0.00   BASOPHILS  0.30  0.10     Recent Labs      06/14/19   0310 06/14/19   0518  06/15/19   0439   SODIUM  133*  134*  133*   POTASSIUM  6.2*  6.0*  4.8   CHLORIDE  105  104  104   CO2  18*  18*  22   GLUCOSE  175*  170*  132*   BUN  40*  41*  36*         A/P:     - NPO, sips with meds.  Awaiting return of GI function  - IS Q One hour while  awake  - Ambulate.  Out of bed for all meals please  - Pain controlled.  Cont PO pain medication  - Labs improved, recheck in am  - drop in H&H, recheck this afternoon  - DVT propholaxis, Hold Lovenox due to drop in H&H, sequentials and ambulate  - Adequate urine output.  Cont, to monitor  - IF continues to do well and tolerates PO, will plan for D/C with return of GI function     Colostomy in place (HCC)- (present on admission)   Assessment & Plan    6/13/19  Resection and closure of colostomy         Discussed with Patient, RN and Dr. Dony Lee, A.PDANNY.  [facility]  [phone]

## 2019-06-15 NOTE — PROGRESS NOTES
Bedside report received.  Assessment complete.  A&O x 4. Patient calls appropriately.  Patient AMBULATORY with SB assist.   Patient has 10/10 pain. Medicated per MAR  Denies N&V. Pt NPO.  Surgical site assessed covered in dressing scant drainage from penrose.  + void, + flatus, pta BM.  Patient denies SOB.  SCD's on.  Review plan with of care with patient. Call light and personal belongings with in reach. Hourly rounding in place. All needs met at this time.

## 2019-06-15 NOTE — CARE PLAN
Problem: Pain Management  Goal: Pain level will decrease to patient's comfort goal  Outcome: PROGRESSING AS EXPECTED  Assess pain Q2-4H, administer pain medication as indicated, alternate between oral and IV medication for pain relief; offer heat and ice. Educate patient and family that pain medication is not scheduled, and that patient is expected to notify staff of pain.     Problem: Venous Thromboembolism (VTW)/Deep Vein Thrombosis (DVT) Prevention:  Goal: Patient will participate in Venous Thrombosis (VTE)/Deep Vein Thrombosis (DVT)Prevention Measures  Outcome: PROGRESSING AS EXPECTED  Patient amublating halls, wearing SCDs while in bed; receiving daily Lovenox

## 2019-06-15 NOTE — PROGRESS NOTES
Bedside report received.  Assessment complete.  A&O x 4. Patient calls appropriately.  Patient up with no assist.    Patient has 5/10 pain. Declines intervention at this time.   Denies N&V. Currently NPO  Abdominal incision with dressing, CDI.   + void, - flatus, - BM.  Patient denies SOB.  SCD's on.  Review plan with of care with patient. Call light and personal belongings with in reach. Hourly rounding in place. All needs met at this time.

## 2019-06-16 ENCOUNTER — APPOINTMENT (OUTPATIENT)
Dept: RADIOLOGY | Facility: MEDICAL CENTER | Age: 59
DRG: 329 | End: 2019-06-16
Attending: SURGERY
Payer: MEDICAID

## 2019-06-16 LAB
ANION GAP SERPL CALC-SCNC: 9 MMOL/L (ref 0–11.9)
BASOPHILS # BLD AUTO: 0.2 % (ref 0–1.8)
BASOPHILS # BLD: 0.02 K/UL (ref 0–0.12)
BUN SERPL-MCNC: 22 MG/DL (ref 8–22)
CALCIUM SERPL-MCNC: 9.1 MG/DL (ref 8.5–10.5)
CHLORIDE SERPL-SCNC: 105 MMOL/L (ref 96–112)
CO2 SERPL-SCNC: 24 MMOL/L (ref 20–33)
CREAT SERPL-MCNC: 0.91 MG/DL (ref 0.5–1.4)
CRP SERPL HS-MCNC: 17.92 MG/DL (ref 0–0.75)
EOSINOPHIL # BLD AUTO: 0.05 K/UL (ref 0–0.51)
EOSINOPHIL NFR BLD: 0.4 % (ref 0–6.9)
ERYTHROCYTE [DISTWIDTH] IN BLOOD BY AUTOMATED COUNT: 51.5 FL (ref 35.9–50)
GLUCOSE SERPL-MCNC: 130 MG/DL (ref 65–99)
HCT VFR BLD AUTO: 25.4 % (ref 42–52)
HGB BLD-MCNC: 7.8 G/DL (ref 14–18)
IMM GRANULOCYTES # BLD AUTO: 0.1 K/UL (ref 0–0.11)
IMM GRANULOCYTES NFR BLD AUTO: 0.8 % (ref 0–0.9)
LYMPHOCYTES # BLD AUTO: 1.28 K/UL (ref 1–4.8)
LYMPHOCYTES NFR BLD: 10 % (ref 22–41)
MCH RBC QN AUTO: 27.3 PG (ref 27–33)
MCHC RBC AUTO-ENTMCNC: 30.7 G/DL (ref 33.7–35.3)
MCV RBC AUTO: 88.8 FL (ref 81.4–97.8)
MONOCYTES # BLD AUTO: 1.08 K/UL (ref 0–0.85)
MONOCYTES NFR BLD AUTO: 8.4 % (ref 0–13.4)
NEUTROPHILS # BLD AUTO: 10.27 K/UL (ref 1.82–7.42)
NEUTROPHILS NFR BLD: 80.2 % (ref 44–72)
NRBC # BLD AUTO: 0 K/UL
NRBC BLD-RTO: 0 /100 WBC
PLATELET # BLD AUTO: 230 K/UL (ref 164–446)
PMV BLD AUTO: 10.4 FL (ref 9–12.9)
POTASSIUM SERPL-SCNC: 4.1 MMOL/L (ref 3.6–5.5)
RBC # BLD AUTO: 2.86 M/UL (ref 4.7–6.1)
SODIUM SERPL-SCNC: 138 MMOL/L (ref 135–145)
WBC # BLD AUTO: 12.8 K/UL (ref 4.8–10.8)

## 2019-06-16 PROCEDURE — A9270 NON-COVERED ITEM OR SERVICE: HCPCS | Performed by: SURGERY

## 2019-06-16 PROCEDURE — A9270 NON-COVERED ITEM OR SERVICE: HCPCS | Performed by: NURSE PRACTITIONER

## 2019-06-16 PROCEDURE — 94668 MNPJ CHEST WALL SBSQ: CPT

## 2019-06-16 PROCEDURE — 700105 HCHG RX REV CODE 258: Performed by: SURGERY

## 2019-06-16 PROCEDURE — 74018 RADEX ABDOMEN 1 VIEW: CPT

## 2019-06-16 PROCEDURE — 302151 K-PAD 14X20: Performed by: SURGERY

## 2019-06-16 PROCEDURE — 700112 HCHG RX REV CODE 229: Performed by: SURGERY

## 2019-06-16 PROCEDURE — 80048 BASIC METABOLIC PNL TOTAL CA: CPT

## 2019-06-16 PROCEDURE — 700102 HCHG RX REV CODE 250 W/ 637 OVERRIDE(OP): Performed by: NURSE PRACTITIONER

## 2019-06-16 PROCEDURE — 700111 HCHG RX REV CODE 636 W/ 250 OVERRIDE (IP): Performed by: SURGERY

## 2019-06-16 PROCEDURE — 700102 HCHG RX REV CODE 250 W/ 637 OVERRIDE(OP): Performed by: SURGERY

## 2019-06-16 PROCEDURE — 86140 C-REACTIVE PROTEIN: CPT

## 2019-06-16 PROCEDURE — 36415 COLL VENOUS BLD VENIPUNCTURE: CPT

## 2019-06-16 PROCEDURE — 770006 HCHG ROOM/CARE - MED/SURG/GYN SEMI*

## 2019-06-16 PROCEDURE — 85025 COMPLETE CBC W/AUTO DIFF WBC: CPT

## 2019-06-16 RX ORDER — LORAZEPAM 2 MG/ML
1 INJECTION INTRAMUSCULAR ONCE
Status: COMPLETED | OUTPATIENT
Start: 2019-06-16 | End: 2019-06-16

## 2019-06-16 RX ADMIN — KETOROLAC TROMETHAMINE 30 MG: 30 INJECTION, SOLUTION INTRAMUSCULAR at 15:52

## 2019-06-16 RX ADMIN — SODIUM CHLORIDE: 9 INJECTION, SOLUTION INTRAVENOUS at 06:09

## 2019-06-16 RX ADMIN — SODIUM CHLORIDE: 9 INJECTION, SOLUTION INTRAVENOUS at 18:45

## 2019-06-16 RX ADMIN — LORAZEPAM 1 MG: 2 INJECTION INTRAMUSCULAR; INTRAVENOUS at 06:09

## 2019-06-16 RX ADMIN — OXYCODONE HYDROCHLORIDE 5 MG: 5 TABLET ORAL at 08:40

## 2019-06-16 RX ADMIN — OXYCODONE HYDROCHLORIDE 5 MG: 5 TABLET ORAL at 16:03

## 2019-06-16 RX ADMIN — HYDROMORPHONE HYDROCHLORIDE 1 MG: 1 INJECTION, SOLUTION INTRAMUSCULAR; INTRAVENOUS; SUBCUTANEOUS at 03:57

## 2019-06-16 RX ADMIN — DOCUSATE SODIUM 100 MG: 100 CAPSULE, LIQUID FILLED ORAL at 18:00

## 2019-06-16 RX ADMIN — SENNOSIDES,DOCUSATE SODIUM 1 TABLET: 8.6; 5 TABLET, FILM COATED ORAL at 20:37

## 2019-06-16 RX ADMIN — FAMOTIDINE 20 MG: 10 INJECTION INTRAVENOUS at 06:09

## 2019-06-16 RX ADMIN — FAMOTIDINE 20 MG: 10 INJECTION INTRAVENOUS at 18:31

## 2019-06-16 RX ADMIN — KETOROLAC TROMETHAMINE 30 MG: 30 INJECTION, SOLUTION INTRAMUSCULAR at 08:40

## 2019-06-16 RX ADMIN — OXYCODONE HYDROCHLORIDE 5 MG: 5 TABLET ORAL at 20:37

## 2019-06-16 RX ADMIN — KETOROLAC TROMETHAMINE 30 MG: 30 INJECTION, SOLUTION INTRAMUSCULAR at 03:57

## 2019-06-16 RX ADMIN — HYDROMORPHONE HYDROCHLORIDE 1 MG: 1 INJECTION, SOLUTION INTRAMUSCULAR; INTRAVENOUS; SUBCUTANEOUS at 11:35

## 2019-06-16 RX ADMIN — ENOXAPARIN SODIUM 40 MG: 100 INJECTION SUBCUTANEOUS at 06:42

## 2019-06-16 NOTE — PROGRESS NOTES
Surgical Progress Note:    POD #3 S/P Resection and closure of Colostomy  Doing well. Neg N/V, no FLATUS/ no BM, Distended. Pulled out NG X2. Pain controlled, Denies chest pain or SOB.  Ambulating    PE:  /74   Pulse 92   Temp 36.9 °C (98.4 °F) (Temporal)   Resp 19   Ht 1.829 m (6')   Wt 82.2 kg (181 lb 3.5 oz)   SpO2 95%   BMI 24.58 kg/m²     I/O:   Intake/Output Summary (Last 24 hours) at 06/14/19 0715  Last data filed at 06/14/19 0400   Gross per 24 hour   Intake             1800 ml   Output              700 ml   Net             1100 ml         Review of Systems   Constitutional: Negative.  Negative for chills and fever.   Respiratory: Negative for shortness of breath.    Cardiovascular: Negative for chest pain.   Gastrointestinal: Negative for nausea and vomiting.        no flatus/no stool   Genitourinary: Negative.      Physical Exam   Constitutional:  appears well-developed.   Neck: Neck supple.   Cardiovascular: Normal rate.    Pulmonary/Chest: Effort normal.   Abdominal: Soft.  exhibits no distension. Appropriate tenderness.   Incisions clean, dry, and intact  Penrose drain in place  Musculoskeletal: Normal range of motion.   Neurological:  alert.   Skin:  Warm and dry.   Extremities: najera, no edema    Labs:  Recent Labs      06/15/19   0439  06/15/19   1246  06/16/19   0436   WBC  13.5*  9.8  12.8*   RBC  2.85*  2.85*  2.86*   HEMOGLOBIN  8.0*  7.8*  7.8*   HEMATOCRIT  25.1*  24.6*  25.4*   MCV  88.1  86.3  88.8   MCH  27.4  27.4  27.3   RDW  51.0*  50.4*  51.5*   PLATELETCT  231  191  230   MPV  10.7  10.3  10.4   NEUTSPOLYS  79.00*  78.70*  80.20*   LYMPHOCYTES  11.30*  13.20*  10.00*   MONOCYTES  8.90  7.30  8.40   EOSINOPHILS  0.00  0.10  0.40   BASOPHILS  0.10  0.10  0.20     Recent Labs      06/14/19   0518  06/15/19   0439  06/16/19   0436   SODIUM  134*  133*  138   POTASSIUM  6.0*  4.8  4.1   CHLORIDE  104  104  105   CO2  18*  22  24   GLUCOSE  170*  132*  130*   BUN  41*  36*  22          A/P:     - NPO, sips with meds.  Awaiting return of GI function  - IS Q One hour while awake  - Ambulate.  Out of bed for all meals please  - Pain controlled.  Cont PO pain medication  - Labs noted, recheck in am  - stable H&H,   - serial CRP  - DVT propholaxis, sequentials and ambulate  - Adequate urine output.  Cont, to monitor  - IF continues to do well and tolerates PO, will plan for D/C with return of GI function     Colostomy in place (HCC)- (present on admission)   Assessment & Plan    6/13/19  Resection and closure of colostomy         Discussed with Patient, RN and Dr. Dony Lee, A.P.N.  [facility]  [phone]

## 2019-06-16 NOTE — PROGRESS NOTES
Bedside report received.  Assessment complete.  A&O x 4. Patient calls appropriately.  Patient up with no assist.    Patient has 10/10 pain. Medication given (see MAR)  Denies N&V. Currently NPO with sips.   Abdominal midline incision with staples, dressing in place over areas with drainage. Right transverse incision with staples and penrose drain, dressing in place.   + void, - flatus, - BM.  Patient denies SOB.  SCD's on.  Review plan with of care with patient. Call light and personal belongings with in reach. Hourly rounding in place. All needs met at this time.

## 2019-06-16 NOTE — PROGRESS NOTES
This RN paged Dr. Napoles regarding patient's increased sanguinous drainage from abdominal midline incision. Orders to continue pressure dressing at this time.

## 2019-06-16 NOTE — PROGRESS NOTES
"Patient stated,\"I am going to burst, I want to have Dr. Napoles here to drain my stomach.\"   This RN offered to medicate patient for pain and ambulate with him.  Patient refused.    Patient's abdomen appears distended and firm.  Drainage from midline incision noted.      Dr. Napoles called this RN stating that the patient's daughter has called her at home and she would like a KUB.  This RN placed the new orders stat.    "

## 2019-06-16 NOTE — PROGRESS NOTES
Assumed care at 1900    Received report from day shift RN.    Reviewed recent lab results, notes, orders, and MAR  POC discussed and updated on care board  Bed is in the lowest and locked position, call light within reach      Patient is on 2L O2 nasal canula  Daughter is present in room  Medicated patient per MAR for pain  Midline incision has some drainage, penrose drain on Left upper quadrant  NPO strict   IVMF running 100ml/hr  -flatus,  Distended and tender.      Patient ambulated in hallway with family.  Steady.

## 2019-06-16 NOTE — PROGRESS NOTES
"Upon entering patient's room for morning report, patient was found to have pulled out NGT. When asked why patient pulled NGT, patient stated\" it wasn't doing anything, I didn't see the point of having it in\". This RN educated patient that NGT was not hooked up to suction yet, as we were waiting for placement verification. Patient then stated \"well no one told me that\". Patient educated that NGT will likely have to be replaced. Patient stated \"you really don't want to do that\". Patient refusing NGT placement at this time.   "

## 2019-06-16 NOTE — PROGRESS NOTES
Patient agreeable to NGT replacement. New NGT placed to low continuous suction. Patient and family educated extensively on indication for and function of NGT. Patient repeatedly reporting that he is going to pull out NGT. Patient educated to leave NGT in until physician gives order to remove it.

## 2019-06-16 NOTE — CARE PLAN
Problem: Safety  Goal: Will remain free from injury  Outcome: PROGRESSING AS EXPECTED  Fall precautions in place    Problem: Bowel/Gastric:  Goal: Normal bowel function is maintained or improved  Outcome: PROGRESSING AS EXPECTED  Encouraged patient to ambulate as much as tolerated.

## 2019-06-17 LAB
ANION GAP SERPL CALC-SCNC: 7 MMOL/L (ref 0–11.9)
BASOPHILS # BLD AUTO: 0.1 % (ref 0–1.8)
BASOPHILS # BLD: 0.01 K/UL (ref 0–0.12)
BUN SERPL-MCNC: 18 MG/DL (ref 8–22)
CALCIUM SERPL-MCNC: 8.9 MG/DL (ref 8.5–10.5)
CHLORIDE SERPL-SCNC: 106 MMOL/L (ref 96–112)
CO2 SERPL-SCNC: 24 MMOL/L (ref 20–33)
CREAT SERPL-MCNC: 0.78 MG/DL (ref 0.5–1.4)
CRP SERPL HS-MCNC: 13.42 MG/DL (ref 0–0.75)
EOSINOPHIL # BLD AUTO: 0.18 K/UL (ref 0–0.51)
EOSINOPHIL NFR BLD: 1.9 % (ref 0–6.9)
ERYTHROCYTE [DISTWIDTH] IN BLOOD BY AUTOMATED COUNT: 48.8 FL (ref 35.9–50)
GLUCOSE SERPL-MCNC: 97 MG/DL (ref 65–99)
HCT VFR BLD AUTO: 23 % (ref 42–52)
HGB BLD-MCNC: 7.3 G/DL (ref 14–18)
IMM GRANULOCYTES # BLD AUTO: 0.06 K/UL (ref 0–0.11)
IMM GRANULOCYTES NFR BLD AUTO: 0.6 % (ref 0–0.9)
LYMPHOCYTES # BLD AUTO: 1.16 K/UL (ref 1–4.8)
LYMPHOCYTES NFR BLD: 12 % (ref 22–41)
MAGNESIUM SERPL-MCNC: 2 MG/DL (ref 1.5–2.5)
MCH RBC QN AUTO: 27.9 PG (ref 27–33)
MCHC RBC AUTO-ENTMCNC: 31.7 G/DL (ref 33.7–35.3)
MCV RBC AUTO: 87.8 FL (ref 81.4–97.8)
MONOCYTES # BLD AUTO: 0.64 K/UL (ref 0–0.85)
MONOCYTES NFR BLD AUTO: 6.6 % (ref 0–13.4)
NEUTROPHILS # BLD AUTO: 7.64 K/UL (ref 1.82–7.42)
NEUTROPHILS NFR BLD: 78.8 % (ref 44–72)
NRBC # BLD AUTO: 0 K/UL
NRBC BLD-RTO: 0 /100 WBC
PHOSPHATE SERPL-MCNC: 2.9 MG/DL (ref 2.5–4.5)
PLATELET # BLD AUTO: 216 K/UL (ref 164–446)
PMV BLD AUTO: 10.5 FL (ref 9–12.9)
POTASSIUM SERPL-SCNC: 4.3 MMOL/L (ref 3.6–5.5)
PROCALCITONIN SERPL-MCNC: 0.14 NG/ML
RBC # BLD AUTO: 2.62 M/UL (ref 4.7–6.1)
SODIUM SERPL-SCNC: 137 MMOL/L (ref 135–145)
WBC # BLD AUTO: 9.7 K/UL (ref 4.8–10.8)

## 2019-06-17 PROCEDURE — 700112 HCHG RX REV CODE 229: Performed by: SURGERY

## 2019-06-17 PROCEDURE — 83735 ASSAY OF MAGNESIUM: CPT

## 2019-06-17 PROCEDURE — 84100 ASSAY OF PHOSPHORUS: CPT

## 2019-06-17 PROCEDURE — 86140 C-REACTIVE PROTEIN: CPT

## 2019-06-17 PROCEDURE — 80048 BASIC METABOLIC PNL TOTAL CA: CPT

## 2019-06-17 PROCEDURE — A9270 NON-COVERED ITEM OR SERVICE: HCPCS | Performed by: SURGERY

## 2019-06-17 PROCEDURE — 770006 HCHG ROOM/CARE - MED/SURG/GYN SEMI*

## 2019-06-17 PROCEDURE — A9270 NON-COVERED ITEM OR SERVICE: HCPCS | Performed by: NURSE PRACTITIONER

## 2019-06-17 PROCEDURE — 700111 HCHG RX REV CODE 636 W/ 250 OVERRIDE (IP): Performed by: SURGERY

## 2019-06-17 PROCEDURE — 700102 HCHG RX REV CODE 250 W/ 637 OVERRIDE(OP): Performed by: NURSE PRACTITIONER

## 2019-06-17 PROCEDURE — 36415 COLL VENOUS BLD VENIPUNCTURE: CPT

## 2019-06-17 PROCEDURE — 700102 HCHG RX REV CODE 250 W/ 637 OVERRIDE(OP): Performed by: SURGERY

## 2019-06-17 PROCEDURE — 84145 PROCALCITONIN (PCT): CPT

## 2019-06-17 PROCEDURE — 700105 HCHG RX REV CODE 258: Performed by: SURGERY

## 2019-06-17 PROCEDURE — 85025 COMPLETE CBC W/AUTO DIFF WBC: CPT

## 2019-06-17 RX ORDER — MORPHINE SULFATE 4 MG/ML
2-4 INJECTION, SOLUTION INTRAMUSCULAR; INTRAVENOUS
Status: DISCONTINUED | OUTPATIENT
Start: 2019-06-17 | End: 2019-06-20

## 2019-06-17 RX ORDER — LABETALOL HYDROCHLORIDE 5 MG/ML
5 INJECTION, SOLUTION INTRAVENOUS EVERY 4 HOURS PRN
Status: DISCONTINUED | OUTPATIENT
Start: 2019-06-17 | End: 2019-07-03 | Stop reason: HOSPADM

## 2019-06-17 RX ORDER — ACETAMINOPHEN 325 MG/1
650 TABLET ORAL EVERY 4 HOURS PRN
Status: DISCONTINUED | OUTPATIENT
Start: 2019-06-17 | End: 2019-07-03 | Stop reason: HOSPADM

## 2019-06-17 RX ORDER — BISACODYL 10 MG
10 SUPPOSITORY, RECTAL RECTAL ONCE
Status: COMPLETED | OUTPATIENT
Start: 2019-06-17 | End: 2019-06-17

## 2019-06-17 RX ADMIN — BISACODYL 10 MG RECTAL SUPPOSITORY 10 MG: at 19:42

## 2019-06-17 RX ADMIN — ACETAMINOPHEN 650 MG: 325 TABLET, FILM COATED ORAL at 21:43

## 2019-06-17 RX ADMIN — SODIUM CHLORIDE: 9 INJECTION, SOLUTION INTRAVENOUS at 15:46

## 2019-06-17 RX ADMIN — DOCUSATE SODIUM 100 MG: 100 CAPSULE, LIQUID FILLED ORAL at 04:52

## 2019-06-17 RX ADMIN — OXYCODONE HYDROCHLORIDE 5 MG: 5 TABLET ORAL at 01:48

## 2019-06-17 RX ADMIN — OXYCODONE HYDROCHLORIDE 5 MG: 5 TABLET ORAL at 15:21

## 2019-06-17 RX ADMIN — OXYCODONE HYDROCHLORIDE 5 MG: 5 TABLET ORAL at 06:47

## 2019-06-17 RX ADMIN — ENOXAPARIN SODIUM 40 MG: 100 INJECTION SUBCUTANEOUS at 04:52

## 2019-06-17 RX ADMIN — DOCUSATE SODIUM 100 MG: 100 CAPSULE, LIQUID FILLED ORAL at 17:49

## 2019-06-17 RX ADMIN — FAMOTIDINE 20 MG: 20 TABLET ORAL at 04:52

## 2019-06-17 RX ADMIN — SENNOSIDES,DOCUSATE SODIUM 1 TABLET: 8.6; 5 TABLET, FILM COATED ORAL at 17:50

## 2019-06-17 RX ADMIN — OXYCODONE HYDROCHLORIDE 5 MG: 5 TABLET ORAL at 19:42

## 2019-06-17 RX ADMIN — OXYCODONE HYDROCHLORIDE 5 MG: 5 TABLET ORAL at 11:19

## 2019-06-17 RX ADMIN — FAMOTIDINE 20 MG: 20 TABLET ORAL at 17:49

## 2019-06-17 RX ADMIN — SODIUM CHLORIDE: 9 INJECTION, SOLUTION INTRAVENOUS at 04:57

## 2019-06-17 RX ADMIN — MAGNESIUM HYDROXIDE 30 ML: 400 SUSPENSION ORAL at 04:52

## 2019-06-17 NOTE — PROGRESS NOTES
Assumed care at 1900    Received report from day shift RN.    Reviewed recent lab results, notes, orders, and MAR  POC discussed and updated on care board  Bed is in the lowest and locked position, call light within reach      Patient's abdomen still distended   +BS hypoactive and distant   Patient encouraged to ambulate  Medicated per MAR for pain  Denies n/v  O2 2L nasal canula  Midline incision island dressing changed   Decreased drainage serosanguinous  Penrose dressing changed   Serous output noted

## 2019-06-17 NOTE — DISCHARGE PLANNING
Care Transition Team Assessment    Assessment Complete.  The information for this assessment was compiled from conducting a chart review and interviewing this patient at bedside.  Patient appeared in pain, indicates MD has addressed his pain level.  Patient confirmed the accuracy of the demographic information in the facesheet.  Patient indicates he lives alone and does not own any DME.  Patient has an abdominal wound and might benefit from C.    Information Source  Orientation : Oriented x 4  Information Given By: Patient  Informant's Name:  (Syed)  Who is responsible for making decisions for patient? : Patient    Readmission Evaluation  Is this a readmission?: No    Elopement Risk  Legal Hold: No  Ambulatory or Self Mobile in Wheelchair: Yes  Disoriented: No  Psychiatric Symptoms: None  History of Wandering: No  Elopement this Admit: No  Vocalizing Wanting to Leave: No  Displays Behaviors, Body Language Wanting to Leave: No-Not at Risk for Elopement  Elopement Risk: Not at Risk for Elopement    Interdisciplinary Discharge Planning  Patient or legal guardian wants to designate a caregiver (see row info): No    Discharge Preparedness  What is your plan after discharge?: Home with help  What are your discharge supports?: Sibling  Prior Functional Level: Independent with Activities of Daily Living    Functional Assessment  Prior Functional Level: Independent with Activities of Daily Living    Finances  Financial Barriers to Discharge: No  Prescription Coverage: Yes    Vision / Hearing Impairment  Vision Impairment : Yes  Right Eye Vision: Impaired  Left Eye Vision: Impaired  Hearing Impairment : Yes  Hearing Impairment: Hearing Device(s) Available  Does Pt Need Special Equipment for the Hearing Impaired?: No         Advance Directive  Advance Directive?: None  Advance Directive offered?: AD Booklet refused    Domestic Abuse  Have you ever been the victim of abuse or violence?: No  Physical Abuse or Sexual Abuse:  No  Verbal Abuse or Emotional Abuse: No  Possible Abuse Reported to:: Not Applicable    Psychological Assessment  History of Substance Abuse: None  History of Psychiatric Problems: No  Non-compliant with Treatment: No  Newly Diagnosed Illness: No    Discharge Risks or Barriers  Discharge risks or barriers?: No    Anticipated Discharge Information  Anticipated discharge disposition: Discharge needs currently unknown  Discharge Address:  ([address] [place] [id number]  Discharge Contact Phone Number:  (Jeremy [phone])

## 2019-06-17 NOTE — CARE PLAN
Problem: Pain Management  Goal: Pain level will decrease to patient's comfort goal  Outcome: PROGRESSING AS EXPECTED  Pain is well controlled with medication per MAR    Problem: Safety  Goal: Will remain free from injury  Outcome: PROGRESSING AS EXPECTED  Fall precautions in place    Problem: Bowel/Gastric:  Goal: Normal bowel function is maintained or improved  Outcome: PROGRESSING SLOWER THAN EXPECTED  +BS, distant, denies flatus.  Beginning bowel protocol    Problem: Respiratory:  Goal: Respiratory status will improve  Outcome: PROGRESSING AS EXPECTED  Educated on the benefit of having bed at  Least 30 degrees, and to continue to ambulate to hopefully have flatus which will decrease the pressure inside his abdomen.

## 2019-06-17 NOTE — PROGRESS NOTES
Pt expresses severe discomfort in his abd from gas distention. No new orders from MD today. Pt not passing gas, but did belch. Pt ambulated half of the unit and did not tolerate that well. He wanted to shower after getting back to bed but then became drowsy. Pt still requiring 2-3L of O2 via nasal cannula. SPO2 will decrease to 82-87% on room air while asleep. Pt encouraged to use incentive spirometer but he is in too much discomfort. Dressings to abd are CDI.     Pt showered mostly independently while sitting in a chair during the shower. He was able to dry himself off and dress himself in a clean gown. Pt moderately to severely dyspneic with exertion. Pt still with severe pain in abd from distention.      called to inquire on pt as the daughter had called with concerns. She was notified that the pt is still in severe discomfort due to abd distention. She was made aware he has not passed any flatus just belched once. Also notified that he is requiring at least 3L of oyxgen to maintain SPO2 >92%. She stated to give the pt a Dulcolax suppository once. Order was placed.

## 2019-06-17 NOTE — PROGRESS NOTES
Surgical Progress Note:    POD #4 S/P Resection and closure of Colostomy  Doing well, but still feels full. Neg N/V, no FLATUS/ no BM, Distended. Pain mod controlled, Denies chest pain or SOB.  Ambulating.  Awaiting return of GI function    PE:  /76   Pulse 96   Temp 37.2 °C (99 °F) (Temporal)   Resp 18   Ht 1.829 m (6')   Wt 82.2 kg (181 lb 3.5 oz)   SpO2 94%   BMI 24.58 kg/m²     I/O:   Intake/Output Summary (Last 24 hours) at 06/14/19 0715  Last data filed at 06/14/19 0400   Gross per 24 hour   Intake             1800 ml   Output              700 ml   Net             1100 ml         Review of Systems   Constitutional: Negative.  Negative for chills and fever.   Respiratory: Negative for shortness of breath.    Cardiovascular: Negative for chest pain.   Gastrointestinal: Negative for nausea and vomiting.        no flatus/no stool   Genitourinary: Negative.      Physical Exam   Constitutional:  appears well-developed.   Neck: Neck supple.   Cardiovascular: Normal rate.    Pulmonary/Chest: Effort normal.   Abdominal: Soft.  exhibits no distension. Appropriate tenderness.   Incisions clean, dry, and intact  Penrose drain in place  Musculoskeletal: Normal range of motion.   Neurological:  alert.   Skin:  Warm and dry.   Extremities: najera, no edema    Labs:  Recent Labs      06/15/19   1246  06/16/19   0436  06/17/19   0307   WBC  9.8  12.8*  9.7   RBC  2.85*  2.86*  2.62*   HEMOGLOBIN  7.8*  7.8*  7.3*   HEMATOCRIT  24.6*  25.4*  23.0*   MCV  86.3  88.8  87.8   MCH  27.4  27.3  27.9   RDW  50.4*  51.5*  48.8   PLATELETCT  191  230  216   MPV  10.3  10.4  10.5   NEUTSPOLYS  78.70*  80.20*  78.80*   LYMPHOCYTES  13.20*  10.00*  12.00*   MONOCYTES  7.30  8.40  6.60   EOSINOPHILS  0.10  0.40  1.90   BASOPHILS  0.10  0.20  0.10     Recent Labs      06/15/19   0439  06/16/19   0436  06/17/19   0307   SODIUM  133*  138  137   POTASSIUM  4.8  4.1  4.3   CHLORIDE  104  105  106   CO2  22  24  24   GLUCOSE  132*   130*  97   BUN  36*  22  18         A/P:     - NPO, sips with meds.  Awaiting return of GI function  - IS Q One hour while awake  - Ambulate.  Out of bed for all meals please  - Pain controlled.  Cont PO pain medication  - Labs noted, recheck in am  - stable H&H,   - serial CRP, trending down  - DVT propholaxis, sequentials and ambulate  - Adequate urine output.  Cont, to monitor  - IF continues to do well and tolerates PO, will plan for D/C with return of GI function     Colostomy in place (HCC)- (present on admission)   Assessment & Plan    6/13/19  Resection and closure of colostomy  Await GI function         Discussed with Patient, RN and Dr. Dony Lee, A.P.N.  [facility]  [phone]

## 2019-06-17 NOTE — CARE PLAN
Problem: Pain Management  Goal: Pain level will decrease to patient's comfort goal  Outcome: PROGRESSING AS EXPECTED      Problem: Safety  Goal: Will remain free from injury  Outcome: PROGRESSING AS EXPECTED  Falls precautions in place.     Problem: Bowel/Gastric:  Goal: Normal bowel function is maintained or improved  Outcome: PROGRESSING SLOWER THAN EXPECTED  Abd distended and semi firm. No BM since prior to surgery. Pt still remains NPO.

## 2019-06-18 ENCOUNTER — APPOINTMENT (OUTPATIENT)
Dept: RADIOLOGY | Facility: MEDICAL CENTER | Age: 59
DRG: 329 | End: 2019-06-18
Attending: SURGERY
Payer: MEDICAID

## 2019-06-18 LAB
ANION GAP SERPL CALC-SCNC: 13 MMOL/L (ref 0–11.9)
BASOPHILS # BLD AUTO: 0.2 % (ref 0–1.8)
BASOPHILS # BLD: 0.04 K/UL (ref 0–0.12)
BUN SERPL-MCNC: 14 MG/DL (ref 8–22)
CALCIUM SERPL-MCNC: 8.6 MG/DL (ref 8.5–10.5)
CHLORIDE SERPL-SCNC: 104 MMOL/L (ref 96–112)
CO2 SERPL-SCNC: 22 MMOL/L (ref 20–33)
CREAT SERPL-MCNC: 0.77 MG/DL (ref 0.5–1.4)
CRP SERPL HS-MCNC: 17.64 MG/DL (ref 0–0.75)
EOSINOPHIL # BLD AUTO: 0.01 K/UL (ref 0–0.51)
EOSINOPHIL NFR BLD: 0 % (ref 0–6.9)
ERYTHROCYTE [DISTWIDTH] IN BLOOD BY AUTOMATED COUNT: 47.9 FL (ref 35.9–50)
GLUCOSE SERPL-MCNC: 110 MG/DL (ref 65–99)
HCT VFR BLD AUTO: 29.3 % (ref 42–52)
HGB BLD-MCNC: 9.2 G/DL (ref 14–18)
IMM GRANULOCYTES # BLD AUTO: 0.13 K/UL (ref 0–0.11)
IMM GRANULOCYTES NFR BLD AUTO: 0.6 % (ref 0–0.9)
LYMPHOCYTES # BLD AUTO: 1.03 K/UL (ref 1–4.8)
LYMPHOCYTES NFR BLD: 5.1 % (ref 22–41)
MCH RBC QN AUTO: 27.1 PG (ref 27–33)
MCHC RBC AUTO-ENTMCNC: 31.4 G/DL (ref 33.7–35.3)
MCV RBC AUTO: 86.2 FL (ref 81.4–97.8)
MONOCYTES # BLD AUTO: 1.2 K/UL (ref 0–0.85)
MONOCYTES NFR BLD AUTO: 6 % (ref 0–13.4)
NEUTROPHILS # BLD AUTO: 17.74 K/UL (ref 1.82–7.42)
NEUTROPHILS NFR BLD: 88.1 % (ref 44–72)
NRBC # BLD AUTO: 0 K/UL
NRBC BLD-RTO: 0 /100 WBC
PLATELET # BLD AUTO: 342 K/UL (ref 164–446)
PMV BLD AUTO: 9.9 FL (ref 9–12.9)
POTASSIUM SERPL-SCNC: 4.3 MMOL/L (ref 3.6–5.5)
RBC # BLD AUTO: 3.4 M/UL (ref 4.7–6.1)
SODIUM SERPL-SCNC: 139 MMOL/L (ref 135–145)
WBC # BLD AUTO: 20.2 K/UL (ref 4.8–10.8)

## 2019-06-18 PROCEDURE — 36415 COLL VENOUS BLD VENIPUNCTURE: CPT

## 2019-06-18 PROCEDURE — 700112 HCHG RX REV CODE 229: Performed by: SURGERY

## 2019-06-18 PROCEDURE — 700111 HCHG RX REV CODE 636 W/ 250 OVERRIDE (IP): Performed by: SURGERY

## 2019-06-18 PROCEDURE — 86140 C-REACTIVE PROTEIN: CPT

## 2019-06-18 PROCEDURE — 80048 BASIC METABOLIC PNL TOTAL CA: CPT

## 2019-06-18 PROCEDURE — 700105 HCHG RX REV CODE 258: Performed by: SURGERY

## 2019-06-18 PROCEDURE — 85025 COMPLETE CBC W/AUTO DIFF WBC: CPT

## 2019-06-18 PROCEDURE — 700105 HCHG RX REV CODE 258

## 2019-06-18 PROCEDURE — 94760 N-INVAS EAR/PLS OXIMETRY 1: CPT

## 2019-06-18 PROCEDURE — A9270 NON-COVERED ITEM OR SERVICE: HCPCS | Performed by: SURGERY

## 2019-06-18 PROCEDURE — A9270 NON-COVERED ITEM OR SERVICE: HCPCS | Performed by: NURSE PRACTITIONER

## 2019-06-18 PROCEDURE — 700102 HCHG RX REV CODE 250 W/ 637 OVERRIDE(OP): Performed by: SURGERY

## 2019-06-18 PROCEDURE — 770006 HCHG ROOM/CARE - MED/SURG/GYN SEMI*

## 2019-06-18 PROCEDURE — 700102 HCHG RX REV CODE 250 W/ 637 OVERRIDE(OP): Performed by: NURSE PRACTITIONER

## 2019-06-18 PROCEDURE — 74018 RADEX ABDOMEN 1 VIEW: CPT

## 2019-06-18 RX ORDER — SODIUM CHLORIDE, SODIUM LACTATE, POTASSIUM CHLORIDE, AND CALCIUM CHLORIDE .6; .31; .03; .02 G/100ML; G/100ML; G/100ML; G/100ML
1000 INJECTION, SOLUTION INTRAVENOUS ONCE
Status: COMPLETED | OUTPATIENT
Start: 2019-06-18 | End: 2019-06-18

## 2019-06-18 RX ORDER — SODIUM CHLORIDE, SODIUM LACTATE, POTASSIUM CHLORIDE, CALCIUM CHLORIDE 600; 310; 30; 20 MG/100ML; MG/100ML; MG/100ML; MG/100ML
INJECTION, SOLUTION INTRAVENOUS
Status: COMPLETED
Start: 2019-06-18 | End: 2019-06-18

## 2019-06-18 RX ORDER — METOCLOPRAMIDE HYDROCHLORIDE 5 MG/ML
10 INJECTION INTRAMUSCULAR; INTRAVENOUS EVERY 6 HOURS
Status: DISCONTINUED | OUTPATIENT
Start: 2019-06-18 | End: 2019-07-03

## 2019-06-18 RX ADMIN — SODIUM CHLORIDE, POTASSIUM CHLORIDE, SODIUM LACTATE AND CALCIUM CHLORIDE 1000 ML: 600; 310; 30; 20 INJECTION, SOLUTION INTRAVENOUS at 12:30

## 2019-06-18 RX ADMIN — SODIUM CHLORIDE: 9 INJECTION, SOLUTION INTRAVENOUS at 17:27

## 2019-06-18 RX ADMIN — SODIUM CHLORIDE: 9 INJECTION, SOLUTION INTRAVENOUS at 02:10

## 2019-06-18 RX ADMIN — SODIUM CHLORIDE, POTASSIUM CHLORIDE, SODIUM LACTATE AND CALCIUM CHLORIDE 1000 ML: 600; 310; 30; 20 INJECTION, SOLUTION INTRAVENOUS at 12:18

## 2019-06-18 RX ADMIN — MORPHINE SULFATE 2 MG: 4 INJECTION INTRAVENOUS at 16:15

## 2019-06-18 RX ADMIN — OXYCODONE HYDROCHLORIDE 5 MG: 5 TABLET ORAL at 05:12

## 2019-06-18 RX ADMIN — ONDANSETRON 4 MG: 2 INJECTION INTRAMUSCULAR; INTRAVENOUS at 20:17

## 2019-06-18 RX ADMIN — OXYCODONE HYDROCHLORIDE 5 MG: 5 TABLET ORAL at 13:49

## 2019-06-18 RX ADMIN — DOCUSATE SODIUM 100 MG: 100 CAPSULE, LIQUID FILLED ORAL at 17:26

## 2019-06-18 RX ADMIN — MAGNESIUM HYDROXIDE 30 ML: 400 SUSPENSION ORAL at 05:12

## 2019-06-18 RX ADMIN — FAMOTIDINE 20 MG: 20 TABLET ORAL at 17:26

## 2019-06-18 RX ADMIN — SENNOSIDES,DOCUSATE SODIUM 1 TABLET: 8.6; 5 TABLET, FILM COATED ORAL at 20:07

## 2019-06-18 RX ADMIN — OXYCODONE HYDROCHLORIDE 5 MG: 5 TABLET ORAL at 09:19

## 2019-06-18 RX ADMIN — FAMOTIDINE 20 MG: 10 INJECTION INTRAVENOUS at 05:12

## 2019-06-18 RX ADMIN — METOCLOPRAMIDE 10 MG: 5 INJECTION, SOLUTION INTRAMUSCULAR; INTRAVENOUS at 12:17

## 2019-06-18 RX ADMIN — OXYCODONE HYDROCHLORIDE 5 MG: 5 TABLET ORAL at 20:06

## 2019-06-18 RX ADMIN — ENOXAPARIN SODIUM 40 MG: 100 INJECTION SUBCUTANEOUS at 05:09

## 2019-06-18 RX ADMIN — METOCLOPRAMIDE 10 MG: 5 INJECTION, SOLUTION INTRAMUSCULAR; INTRAVENOUS at 17:27

## 2019-06-18 RX ADMIN — SODIUM CHLORIDE: 9 INJECTION, SOLUTION INTRAVENOUS at 11:50

## 2019-06-18 NOTE — CARE PLAN
Problem: Nutritional:  Goal: Achieve adequate nutritional intake  Advance diet as feasible and patient will consume >50% of meals  Outcome: NOT MET         Not applicable

## 2019-06-18 NOTE — DIETARY
Nutrition Services: Update   Day 5 of admit.  Syed Ladd is a 59 y.o. male with admitting DX of diverticulitis.    Pt has been NPO x 5 days - inadequate nutrition.  Pt is POD#5 s/p resection and closure of colostomy.  Per Surgery, NPO status to continue; awaiting GI function.  Noted no BM, no flatus, abdominal distension present.  Per Surgery note from today 6/18, abdominal x-ray, possible xlap later today.  RD continues to monitor for diet advancement and will make recommendations accordingly.     Malnutrition Risk: Pt is at risk for malnutrition r/t 5 day(s) or greater of <50% meals consumed.     Recommendations/Plan:  1. Advance diet as feasible.  2. If diet is unable to advance, pt would benefit from nutrition support.  3. Monitor weight.  4. Obtain supplement order per RD as needed.    RD following

## 2019-06-18 NOTE — PROGRESS NOTES
"Patient WBC increase 20.2 from 9.7, was ferbrile last night T max 102.6, remains mildly tachy. APRN notified and assessed patient this am. /81   Pulse 100   Temp 36.8 °C (98.2 °F) (Temporal)   Resp 18   Ht 1.829 m (6')   Wt 82.2 kg (181 lb 3.5 oz)   SpO2 97%   BMI 24.58 kg/m² . Abdomen distended, firm, report small \"nugget of stool\" following suppository  Last night no flatus, no nausea no vomiting. Ambulated this am in hallway. O2 removed RA sating 93%. MD orders reviewed, all questions asnwered     "

## 2019-06-18 NOTE — PROGRESS NOTES
Surgical Progress Note:    POD #5 S/P Resection and closure of Colostomy  Febrile. Neg N/V, no FLATUS/ no BM, Distended. Pain mod controlled, Denies chest pain or SOB.  Ambulating.  CRP trending up.  WBC's up.  Check abd xray.  Possible xlap later today.  PE:  /81   Pulse 100   Temp 36.8 °C (98.2 °F) (Temporal)   Resp 18   Ht 1.829 m (6')   Wt 82.2 kg (181 lb 3.5 oz)   SpO2 97%   BMI 24.58 kg/m²     I/O:   Intake/Output Summary (Last 24 hours) at 06/14/19 0715  Last data filed at 06/14/19 0400   Gross per 24 hour   Intake             1800 ml   Output              700 ml   Net             1100 ml         Review of Systems   Constitutional: Negative.  Negative for chills and fever.   Respiratory: Negative for shortness of breath.    Cardiovascular: Negative for chest pain.   Gastrointestinal: Negative for nausea and vomiting.        no flatus/no stool   Genitourinary: Negative.      Physical Exam   Constitutional:  appears well-developed.   Neck: Neck supple.   Cardiovascular: Normal rate.    Pulmonary/Chest: Effort normal.   Abdominal: Soft.  exhibits no distension. Appropriate tenderness.   Incisions clean, dry, and intact  Penrose drain in place  Musculoskeletal: Normal range of motion.   Neurological:  alert.   Skin:  Warm and dry.   Extremities: najera, no edema    Labs:  Recent Labs      06/16/19 0436 06/17/19 0307 06/18/19   0518   WBC  12.8*  9.7  20.2*   RBC  2.86*  2.62*  3.40*   HEMOGLOBIN  7.8*  7.3*  9.2*   HEMATOCRIT  25.4*  23.0*  29.3*   MCV  88.8  87.8  86.2   MCH  27.3  27.9  27.1   RDW  51.5*  48.8  47.9   PLATELETCT  230  216  342   MPV  10.4  10.5  9.9   NEUTSPOLYS  80.20*  78.80*  88.10*   LYMPHOCYTES  10.00*  12.00*  5.10*   MONOCYTES  8.40  6.60  6.00   EOSINOPHILS  0.40  1.90  0.00   BASOPHILS  0.20  0.10  0.20     Recent Labs      06/16/19 0436  06/17/19   0307  06/18/19   0518   SODIUM  138  137  139   POTASSIUM  4.1  4.3  4.3   CHLORIDE  105  106  104   CO2  24  24  22    GLUCOSE  130*  97  110*   BUN  22  18  14         A/P:     - NPO, sips with meds.  Awaiting return of GI function  - IS Q One hour while awake  - Ambulate.  Out of bed for all meals please  - Pain controlled.  Cont PO pain medication  - Labs noted, recheck in am  - serial CRP, trending up  - DVT propholaxis, sequentials and ambulate  - Adequate urine output.  Cont, to monitor  - abd xray, possible xlap later today    Colostomy in place (HCC)- (present on admission)   Assessment & Plan    6/13/19  Resection and closure of colostomy  Await GI function         Discussed with Patient, RN and Dr. Dony Lee, A.P.N.  [facility]  [phone]

## 2019-06-18 NOTE — CARE PLAN
Problem: Pain Management  Goal: Pain level will decrease to patient's comfort goal  Outcome: PROGRESSING AS EXPECTED  Will encourage pt to ambulate, Will medicate per MAR, and will provide non-pharmacologic pain relief measures.     Problem: Safety  Goal: Will remain free from injury  Outcome: PROGRESSING AS EXPECTED  Bed locked and in lowest position.  Call light and personal belongings are within reach.

## 2019-06-18 NOTE — PROGRESS NOTES
Bedside report received.  Assessment complete.  A&O x 4. Patient calls appropriately.  Patient up with standby assist.    Patient has 7/10 pain. Medicated per MAR.  Denies N&V. Patient is currently NPO.  Surgical midline is dressed, CDI.  Penrose drain to LLQ, is dressed, CDI, scant output.  + void, - flatus, - BM.  SCD's on.  Patient pleasant with staff and resting in bed with family at bedside.  Review plan with of care with patient. Call light and personal belongings with in reach. Hourly rounding in place. All needs met at this time.

## 2019-06-19 ENCOUNTER — APPOINTMENT (OUTPATIENT)
Dept: RADIOLOGY | Facility: MEDICAL CENTER | Age: 59
DRG: 329 | End: 2019-06-19
Attending: SURGERY
Payer: MEDICAID

## 2019-06-19 PROBLEM — J18.9 PNEUMONIA: Status: ACTIVE | Noted: 2019-06-19

## 2019-06-19 LAB
ALBUMIN SERPL BCP-MCNC: 2.9 G/DL (ref 3.2–4.9)
ALBUMIN/GLOB SERPL: 1.2 G/DL
ALP SERPL-CCNC: 70 U/L (ref 30–99)
ALT SERPL-CCNC: 10 U/L (ref 2–50)
ANION GAP SERPL CALC-SCNC: 10 MMOL/L (ref 0–11.9)
AST SERPL-CCNC: 10 U/L (ref 12–45)
BASOPHILS # BLD AUTO: 0.2 % (ref 0–1.8)
BASOPHILS # BLD: 0.04 K/UL (ref 0–0.12)
BILIRUB SERPL-MCNC: 4.3 MG/DL (ref 0.1–1.5)
BUN SERPL-MCNC: 13 MG/DL (ref 8–22)
CALCIUM SERPL-MCNC: 8.4 MG/DL (ref 8.5–10.5)
CHLORIDE SERPL-SCNC: 107 MMOL/L (ref 96–112)
CO2 SERPL-SCNC: 22 MMOL/L (ref 20–33)
CREAT SERPL-MCNC: 0.8 MG/DL (ref 0.5–1.4)
CRP SERPL HS-MCNC: 33.51 MG/DL (ref 0–0.75)
EOSINOPHIL # BLD AUTO: 0.01 K/UL (ref 0–0.51)
EOSINOPHIL NFR BLD: 0 % (ref 0–6.9)
ERYTHROCYTE [DISTWIDTH] IN BLOOD BY AUTOMATED COUNT: 49.6 FL (ref 35.9–50)
GLOBULIN SER CALC-MCNC: 2.4 G/DL (ref 1.9–3.5)
GLUCOSE SERPL-MCNC: 119 MG/DL (ref 65–99)
HCT VFR BLD AUTO: 26.7 % (ref 42–52)
HGB BLD-MCNC: 8.3 G/DL (ref 14–18)
IMM GRANULOCYTES # BLD AUTO: 0.15 K/UL (ref 0–0.11)
IMM GRANULOCYTES NFR BLD AUTO: 0.7 % (ref 0–0.9)
LYMPHOCYTES # BLD AUTO: 1.42 K/UL (ref 1–4.8)
LYMPHOCYTES NFR BLD: 6.3 % (ref 22–41)
MCH RBC QN AUTO: 27.2 PG (ref 27–33)
MCHC RBC AUTO-ENTMCNC: 31.1 G/DL (ref 33.7–35.3)
MCV RBC AUTO: 87.5 FL (ref 81.4–97.8)
MONOCYTES # BLD AUTO: 1.77 K/UL (ref 0–0.85)
MONOCYTES NFR BLD AUTO: 7.8 % (ref 0–13.4)
NEUTROPHILS # BLD AUTO: 19.16 K/UL (ref 1.82–7.42)
NEUTROPHILS NFR BLD: 85 % (ref 44–72)
NRBC # BLD AUTO: 0 K/UL
NRBC BLD-RTO: 0 /100 WBC
PLATELET # BLD AUTO: 368 K/UL (ref 164–446)
PMV BLD AUTO: 9.8 FL (ref 9–12.9)
POTASSIUM SERPL-SCNC: 4.4 MMOL/L (ref 3.6–5.5)
PROT SERPL-MCNC: 5.3 G/DL (ref 6–8.2)
RBC # BLD AUTO: 3.05 M/UL (ref 4.7–6.1)
SODIUM SERPL-SCNC: 139 MMOL/L (ref 135–145)
WBC # BLD AUTO: 22.6 K/UL (ref 4.8–10.8)

## 2019-06-19 PROCEDURE — 74018 RADEX ABDOMEN 1 VIEW: CPT

## 2019-06-19 PROCEDURE — 770006 HCHG ROOM/CARE - MED/SURG/GYN SEMI*

## 2019-06-19 PROCEDURE — 85025 COMPLETE CBC W/AUTO DIFF WBC: CPT

## 2019-06-19 PROCEDURE — A9270 NON-COVERED ITEM OR SERVICE: HCPCS | Performed by: NURSE PRACTITIONER

## 2019-06-19 PROCEDURE — 86140 C-REACTIVE PROTEIN: CPT

## 2019-06-19 PROCEDURE — 700105 HCHG RX REV CODE 258: Performed by: NURSE PRACTITIONER

## 2019-06-19 PROCEDURE — 700112 HCHG RX REV CODE 229: Performed by: SURGERY

## 2019-06-19 PROCEDURE — A9270 NON-COVERED ITEM OR SERVICE: HCPCS | Performed by: SURGERY

## 2019-06-19 PROCEDURE — 700101 HCHG RX REV CODE 250: Performed by: NURSE PRACTITIONER

## 2019-06-19 PROCEDURE — 700111 HCHG RX REV CODE 636 W/ 250 OVERRIDE (IP): Performed by: SURGERY

## 2019-06-19 PROCEDURE — 700105 HCHG RX REV CODE 258: Performed by: SURGERY

## 2019-06-19 PROCEDURE — 71045 X-RAY EXAM CHEST 1 VIEW: CPT

## 2019-06-19 PROCEDURE — 700102 HCHG RX REV CODE 250 W/ 637 OVERRIDE(OP): Performed by: NURSE PRACTITIONER

## 2019-06-19 PROCEDURE — 700111 HCHG RX REV CODE 636 W/ 250 OVERRIDE (IP): Performed by: NURSE PRACTITIONER

## 2019-06-19 PROCEDURE — 80053 COMPREHEN METABOLIC PANEL: CPT

## 2019-06-19 RX ORDER — CIPROFLOXACIN 2 MG/ML
400 INJECTION, SOLUTION INTRAVENOUS EVERY 12 HOURS
Status: DISCONTINUED | OUTPATIENT
Start: 2019-06-19 | End: 2019-06-19

## 2019-06-19 RX ADMIN — MORPHINE SULFATE 2 MG: 4 INJECTION INTRAVENOUS at 21:22

## 2019-06-19 RX ADMIN — METOCLOPRAMIDE 10 MG: 5 INJECTION, SOLUTION INTRAMUSCULAR; INTRAVENOUS at 17:15

## 2019-06-19 RX ADMIN — OXYCODONE HYDROCHLORIDE 5 MG: 5 TABLET ORAL at 04:20

## 2019-06-19 RX ADMIN — METOCLOPRAMIDE 10 MG: 5 INJECTION, SOLUTION INTRAMUSCULAR; INTRAVENOUS at 00:16

## 2019-06-19 RX ADMIN — MORPHINE SULFATE 4 MG: 4 INJECTION INTRAVENOUS at 08:28

## 2019-06-19 RX ADMIN — SODIUM CHLORIDE: 9 INJECTION, SOLUTION INTRAVENOUS at 21:31

## 2019-06-19 RX ADMIN — METOCLOPRAMIDE 10 MG: 5 INJECTION, SOLUTION INTRAMUSCULAR; INTRAVENOUS at 23:39

## 2019-06-19 RX ADMIN — MORPHINE SULFATE 2 MG: 4 INJECTION INTRAVENOUS at 13:06

## 2019-06-19 RX ADMIN — SODIUM CHLORIDE: 9 INJECTION, SOLUTION INTRAVENOUS at 15:18

## 2019-06-19 RX ADMIN — FAMOTIDINE 20 MG: 10 INJECTION INTRAVENOUS at 05:48

## 2019-06-19 RX ADMIN — METOCLOPRAMIDE 10 MG: 5 INJECTION, SOLUTION INTRAMUSCULAR; INTRAVENOUS at 05:48

## 2019-06-19 RX ADMIN — MORPHINE SULFATE 2 MG: 4 INJECTION INTRAVENOUS at 23:39

## 2019-06-19 RX ADMIN — AZITHROMYCIN MONOHYDRATE 500 MG: 500 INJECTION, POWDER, LYOPHILIZED, FOR SOLUTION INTRAVENOUS at 08:28

## 2019-06-19 RX ADMIN — ENOXAPARIN SODIUM 40 MG: 100 INJECTION SUBCUTANEOUS at 05:49

## 2019-06-19 RX ADMIN — SODIUM CHLORIDE: 9 INJECTION, SOLUTION INTRAVENOUS at 05:58

## 2019-06-19 RX ADMIN — MORPHINE SULFATE 2 MG: 4 INJECTION INTRAVENOUS at 11:00

## 2019-06-19 RX ADMIN — CEFTRIAXONE SODIUM 1 G: 1 INJECTION, POWDER, FOR SOLUTION INTRAMUSCULAR; INTRAVENOUS at 08:00

## 2019-06-19 RX ADMIN — MORPHINE SULFATE 2 MG: 4 INJECTION INTRAVENOUS at 17:15

## 2019-06-19 RX ADMIN — OXYCODONE HYDROCHLORIDE 5 MG: 5 TABLET ORAL at 00:15

## 2019-06-19 RX ADMIN — MORPHINE SULFATE 2 MG: 4 INJECTION INTRAVENOUS at 15:17

## 2019-06-19 RX ADMIN — SODIUM CHLORIDE: 9 INJECTION, SOLUTION INTRAVENOUS at 00:16

## 2019-06-19 RX ADMIN — DOCUSATE SODIUM 100 MG: 100 CAPSULE, LIQUID FILLED ORAL at 05:49

## 2019-06-19 RX ADMIN — FAMOTIDINE 20 MG: 10 INJECTION INTRAVENOUS at 17:15

## 2019-06-19 RX ADMIN — MORPHINE SULFATE 2 MG: 4 INJECTION INTRAVENOUS at 19:15

## 2019-06-19 RX ADMIN — LIDOCAINE HYDROCHLORIDE 1 APPLICATION: 20 JELLY TOPICAL at 07:48

## 2019-06-19 RX ADMIN — METOCLOPRAMIDE 10 MG: 5 INJECTION, SOLUTION INTRAMUSCULAR; INTRAVENOUS at 11:00

## 2019-06-19 NOTE — CARE PLAN
Problem: Pain Management  Goal: Pain level will decrease to patient's comfort goal  Outcome: PROGRESSING AS EXPECTED  Pain assessed and medicated per MAR.    Problem: Knowledge Deficit  Goal: Knowledge of disease process/condition, treatment plan, diagnostic tests, and medications will improve  Outcome: PROGRESSING AS EXPECTED  POC discussed with patient and daughter. Questions and concerns addressed.

## 2019-06-19 NOTE — PROGRESS NOTES
"RN placed NG -tube to left nare per MD order. Patient tolerated well stating \"went better than before\". 200 ml of green drainage out with placement.   "

## 2019-06-19 NOTE — PROGRESS NOTES
Assessment complete.  AA&Ox4. SpO2 >90 on 1L via NC.  Reporting 10/10 pain. Medicated per MAR.   MLI INES and well approximated with staples.  Penrose drain to LLQ with dressing dry and intact.  NPO, sips with meds. Medicated for nausea.  Abdomen remains firm and distended. - flatus per pt.  + void.  250 ft lap completed with daughter this evening.  All needs met at this time. Call light within reach. Pt calls appropriately.

## 2019-06-19 NOTE — PROGRESS NOTES
Patient reports having had 3 small loose tarry BM's. Abdomen remains distented firm, tender. Patient is agreeable to NG-tube this am, wanted 20 mins and then stated that RN may place. Midline with staples INES, old ostomy site with penrose to LQ draining scant amount of sero snag. WBC increased and IV antibiotics were started. MD orders reviewed with patient, all questions asnwered. /85   Pulse 97   Temp 37 °C (98.6 °F) (Oral)   Resp 16   Ht 1.829 m (6')   Wt 82.2 kg (181 lb 3.5 oz)   SpO2 94%

## 2019-06-19 NOTE — PROGRESS NOTES
Patient family came in and patients sister yelling at RN not understanding why patient has not been started on probiotics and and enough pain medication telling RN how his immune system is low and that's why he got pneumonia. RN explained to family member that she needed to calm down and have discussion with RN or security would be called. Family member calmed down and RN spent 45 minutes explaining ileus, NG-tube, IV antibiotics, ambulation, IS , good pulmonary hygiene, judicious use of narcotics and side effects, as well as how to communicate questions ans concerns.to paitent and family. Family now relaxed and visiting with patient.

## 2019-06-20 ENCOUNTER — APPOINTMENT (OUTPATIENT)
Dept: RADIOLOGY | Facility: MEDICAL CENTER | Age: 59
DRG: 329 | End: 2019-06-20
Attending: SURGERY
Payer: MEDICAID

## 2019-06-20 ENCOUNTER — ANESTHESIA EVENT (OUTPATIENT)
Dept: SURGERY | Facility: MEDICAL CENTER | Age: 59
DRG: 329 | End: 2019-06-20
Payer: MEDICAID

## 2019-06-20 ENCOUNTER — ANESTHESIA (OUTPATIENT)
Dept: SURGERY | Facility: MEDICAL CENTER | Age: 59
DRG: 329 | End: 2019-06-20
Payer: MEDICAID

## 2019-06-20 ENCOUNTER — APPOINTMENT (OUTPATIENT)
Dept: RADIOLOGY | Facility: MEDICAL CENTER | Age: 59
DRG: 329 | End: 2019-06-20
Attending: ANESTHESIOLOGY
Payer: MEDICAID

## 2019-06-20 LAB
ABO + RH BLD: NORMAL
ABO GROUP BLD: NORMAL
ACTION RANGE TRIGGERED IACRT: NO
ANION GAP SERPL CALC-SCNC: 11 MMOL/L (ref 0–11.9)
BARCODED ABORH UBTYP: 5100
BARCODED ABORH UBTYP: 5100
BARCODED PRD CODE UBPRD: NORMAL
BARCODED PRD CODE UBPRD: NORMAL
BARCODED UNIT NUM UBUNT: NORMAL
BARCODED UNIT NUM UBUNT: NORMAL
BASE EXCESS BLDA CALC-SCNC: -6 MMOL/L (ref -4–3)
BASOPHILS # BLD AUTO: 0.2 % (ref 0–1.8)
BASOPHILS # BLD: 0.06 K/UL (ref 0–0.12)
BLD GP AB SCN SERPL QL: NORMAL
BODY TEMPERATURE: ABNORMAL DEGREES
BUN SERPL-MCNC: 14 MG/DL (ref 8–22)
CALCIUM SERPL-MCNC: 8.6 MG/DL (ref 8.5–10.5)
CHLORIDE SERPL-SCNC: 105 MMOL/L (ref 96–112)
CO2 BLDA-SCNC: 19 MMOL/L (ref 20–33)
CO2 SERPL-SCNC: 23 MMOL/L (ref 20–33)
COMPONENT R 8504R: NORMAL
COMPONENT R 8504R: NORMAL
CREAT SERPL-MCNC: 0.93 MG/DL (ref 0.5–1.4)
CRP SERPL HS-MCNC: 31.39 MG/DL (ref 0–0.75)
EOSINOPHIL # BLD AUTO: 0.03 K/UL (ref 0–0.51)
EOSINOPHIL NFR BLD: 0.1 % (ref 0–6.9)
ERYTHROCYTE [DISTWIDTH] IN BLOOD BY AUTOMATED COUNT: 50 FL (ref 35.9–50)
GLUCOSE SERPL-MCNC: 118 MG/DL (ref 65–99)
GRAM STN SPEC: NORMAL
HCO3 BLDA-SCNC: 17.8 MMOL/L (ref 17–25)
HCT VFR BLD AUTO: 29.1 % (ref 42–52)
HGB BLD-MCNC: 8.5 G/DL (ref 14–18)
HGB BLD-MCNC: 9.3 G/DL (ref 14–18)
HOROWITZ INDEX BLDA+IHG-RTO: 157 MM[HG]
IMM GRANULOCYTES # BLD AUTO: 0.32 K/UL (ref 0–0.11)
IMM GRANULOCYTES NFR BLD AUTO: 1.2 % (ref 0–0.9)
INST. QUALIFIED PATIENT IIQPT: YES
LYMPHOCYTES # BLD AUTO: 1.32 K/UL (ref 1–4.8)
LYMPHOCYTES NFR BLD: 5.1 % (ref 22–41)
MAGNESIUM SERPL-MCNC: 2.2 MG/DL (ref 1.5–2.5)
MCH RBC QN AUTO: 28.4 PG (ref 27–33)
MCHC RBC AUTO-ENTMCNC: 32 G/DL (ref 33.7–35.3)
MCV RBC AUTO: 89 FL (ref 81.4–97.8)
MONOCYTES # BLD AUTO: 1.86 K/UL (ref 0–0.85)
MONOCYTES NFR BLD AUTO: 7.2 % (ref 0–13.4)
NEUTROPHILS # BLD AUTO: 22.14 K/UL (ref 1.82–7.42)
NEUTROPHILS NFR BLD: 86.2 % (ref 44–72)
NRBC # BLD AUTO: 0 K/UL
NRBC BLD-RTO: 0 /100 WBC
O2/TOTAL GAS SETTING VFR VENT: 60 %
PATHOLOGY CONSULT NOTE: NORMAL
PCO2 BLDA: 27.3 MMHG (ref 26–37)
PCO2 TEMP ADJ BLDA: 26.9 MMHG (ref 26–37)
PH BLDA: 7.42 [PH] (ref 7.4–7.5)
PH TEMP ADJ BLDA: 7.43 [PH] (ref 7.4–7.5)
PHOSPHATE SERPL-MCNC: 3 MG/DL (ref 2.5–4.5)
PLATELET # BLD AUTO: 506 K/UL (ref 164–446)
PMV BLD AUTO: 9.9 FL (ref 9–12.9)
PO2 BLDA: 94 MMHG (ref 64–87)
PO2 TEMP ADJ BLDA: 92 MMHG (ref 64–87)
POTASSIUM SERPL-SCNC: 3.8 MMOL/L (ref 3.6–5.5)
PRODUCT TYPE UPROD: NORMAL
PRODUCT TYPE UPROD: NORMAL
RBC # BLD AUTO: 3.27 M/UL (ref 4.7–6.1)
RH BLD: NORMAL
SAO2 % BLDA: 98 % (ref 93–99)
SIGNIFICANT IND 70042: NORMAL
SITE SITE: NORMAL
SODIUM SERPL-SCNC: 139 MMOL/L (ref 135–145)
SOURCE SOURCE: NORMAL
SPECIMEN DRAWN FROM PATIENT: ABNORMAL
UNIT STATUS USTAT: NORMAL
UNIT STATUS USTAT: NORMAL
WBC # BLD AUTO: 25.7 K/UL (ref 4.8–10.8)

## 2019-06-20 PROCEDURE — 85025 COMPLETE CBC W/AUTO DIFF WBC: CPT

## 2019-06-20 PROCEDURE — 160009 HCHG ANES TIME/MIN: Performed by: SURGERY

## 2019-06-20 PROCEDURE — 87070 CULTURE OTHR SPECIMN AEROBIC: CPT

## 2019-06-20 PROCEDURE — 501455: Performed by: SURGERY

## 2019-06-20 PROCEDURE — 30243N1 TRANSFUSION OF NONAUTOLOGOUS RED BLOOD CELLS INTO CENTRAL VEIN, PERCUTANEOUS APPROACH: ICD-10-PCS | Performed by: SURGERY

## 2019-06-20 PROCEDURE — 0DCV0ZZ EXTIRPATION OF MATTER FROM MESENTERY, OPEN APPROACH: ICD-10-PCS | Performed by: SURGERY

## 2019-06-20 PROCEDURE — 82330 ASSAY OF CALCIUM: CPT

## 2019-06-20 PROCEDURE — 84295 ASSAY OF SERUM SODIUM: CPT

## 2019-06-20 PROCEDURE — A6402 STERILE GAUZE <= 16 SQ IN: HCPCS | Performed by: SURGERY

## 2019-06-20 PROCEDURE — 700105 HCHG RX REV CODE 258: Performed by: NURSE PRACTITIONER

## 2019-06-20 PROCEDURE — 700101 HCHG RX REV CODE 250: Performed by: ANESTHESIOLOGY

## 2019-06-20 PROCEDURE — 700111 HCHG RX REV CODE 636 W/ 250 OVERRIDE (IP): Performed by: ANESTHESIOLOGY

## 2019-06-20 PROCEDURE — 502704 HCHG DEVICE, LIGASURE IMPACT: Performed by: SURGERY

## 2019-06-20 PROCEDURE — 160029 HCHG SURGERY MINUTES - 1ST 30 MINS LEVEL 4: Performed by: SURGERY

## 2019-06-20 PROCEDURE — 84100 ASSAY OF PHOSPHORUS: CPT

## 2019-06-20 PROCEDURE — 0DB80ZZ EXCISION OF SMALL INTESTINE, OPEN APPROACH: ICD-10-PCS | Performed by: SURGERY

## 2019-06-20 PROCEDURE — 86850 RBC ANTIBODY SCREEN: CPT

## 2019-06-20 PROCEDURE — 86901 BLOOD TYPING SEROLOGIC RH(D): CPT

## 2019-06-20 PROCEDURE — 87075 CULTR BACTERIA EXCEPT BLOOD: CPT

## 2019-06-20 PROCEDURE — 71045 X-RAY EXAM CHEST 1 VIEW: CPT

## 2019-06-20 PROCEDURE — 501448 HCHG STAPLER, TA 45 4.8: Performed by: SURGERY

## 2019-06-20 PROCEDURE — 160048 HCHG OR STATISTICAL LEVEL 1-5: Performed by: SURGERY

## 2019-06-20 PROCEDURE — 82947 ASSAY GLUCOSE BLOOD QUANT: CPT

## 2019-06-20 PROCEDURE — P9016 RBC LEUKOCYTES REDUCED: HCPCS

## 2019-06-20 PROCEDURE — 87205 SMEAR GRAM STAIN: CPT

## 2019-06-20 PROCEDURE — 86923 COMPATIBILITY TEST ELECTRIC: CPT

## 2019-06-20 PROCEDURE — 160041 HCHG SURGERY MINUTES - EA ADDL 1 MIN LEVEL 4: Performed by: SURGERY

## 2019-06-20 PROCEDURE — 85018 HEMOGLOBIN: CPT

## 2019-06-20 PROCEDURE — 84132 ASSAY OF SERUM POTASSIUM: CPT

## 2019-06-20 PROCEDURE — 80048 BASIC METABOLIC PNL TOTAL CA: CPT

## 2019-06-20 PROCEDURE — 5A1935Z RESPIRATORY VENTILATION, LESS THAN 24 CONSECUTIVE HOURS: ICD-10-PCS | Performed by: SURGERY

## 2019-06-20 PROCEDURE — P9045 ALBUMIN (HUMAN), 5%, 250 ML: HCPCS | Performed by: ANESTHESIOLOGY

## 2019-06-20 PROCEDURE — 94002 VENT MGMT INPAT INIT DAY: CPT

## 2019-06-20 PROCEDURE — A4314 CATH W/DRAINAGE 2-WAY LATEX: HCPCS | Performed by: SURGERY

## 2019-06-20 PROCEDURE — 700111 HCHG RX REV CODE 636 W/ 250 OVERRIDE (IP): Performed by: NURSE PRACTITIONER

## 2019-06-20 PROCEDURE — 501445 HCHG STAPLER, SKIN DISP: Performed by: SURGERY

## 2019-06-20 PROCEDURE — 87077 CULTURE AEROBIC IDENTIFY: CPT | Mod: 91

## 2019-06-20 PROCEDURE — 700105 HCHG RX REV CODE 258: Performed by: SURGERY

## 2019-06-20 PROCEDURE — 0DJD8ZZ INSPECTION OF LOWER INTESTINAL TRACT, VIA NATURAL OR ARTIFICIAL OPENING ENDOSCOPIC: ICD-10-PCS | Performed by: SURGERY

## 2019-06-20 PROCEDURE — 02HV33Z INSERTION OF INFUSION DEVICE INTO SUPERIOR VENA CAVA, PERCUTANEOUS APPROACH: ICD-10-PCS | Performed by: SURGERY

## 2019-06-20 PROCEDURE — 88305 TISSUE EXAM BY PATHOLOGIST: CPT

## 2019-06-20 PROCEDURE — 36430 TRANSFUSION BLD/BLD COMPNT: CPT

## 2019-06-20 PROCEDURE — 770022 HCHG ROOM/CARE - ICU (200)

## 2019-06-20 PROCEDURE — 0DBN0ZZ EXCISION OF SIGMOID COLON, OPEN APPROACH: ICD-10-PCS | Performed by: SURGERY

## 2019-06-20 PROCEDURE — 85014 HEMATOCRIT: CPT

## 2019-06-20 PROCEDURE — 87186 SC STD MICRODIL/AGAR DIL: CPT | Mod: 91

## 2019-06-20 PROCEDURE — 700111 HCHG RX REV CODE 636 W/ 250 OVERRIDE (IP): Performed by: SURGERY

## 2019-06-20 PROCEDURE — 501838 HCHG SUTURE GENERAL: Performed by: SURGERY

## 2019-06-20 PROCEDURE — 83735 ASSAY OF MAGNESIUM: CPT

## 2019-06-20 PROCEDURE — C1725 CATH, TRANSLUMIN NON-LASER: HCPCS | Performed by: SURGERY

## 2019-06-20 PROCEDURE — 700105 HCHG RX REV CODE 258: Performed by: ANESTHESIOLOGY

## 2019-06-20 PROCEDURE — 86900 BLOOD TYPING SEROLOGIC ABO: CPT

## 2019-06-20 PROCEDURE — 86140 C-REACTIVE PROTEIN: CPT

## 2019-06-20 PROCEDURE — 82803 BLOOD GASES ANY COMBINATION: CPT | Mod: 91

## 2019-06-20 RX ORDER — SODIUM CHLORIDE, SODIUM LACTATE, POTASSIUM CHLORIDE, CALCIUM CHLORIDE 600; 310; 30; 20 MG/100ML; MG/100ML; MG/100ML; MG/100ML
INJECTION, SOLUTION INTRAVENOUS
Status: DISCONTINUED | OUTPATIENT
Start: 2019-06-20 | End: 2019-06-20 | Stop reason: SURG

## 2019-06-20 RX ORDER — PHENYLEPHRINE HYDROCHLORIDE 10 MG/ML
INJECTION, SOLUTION INTRAMUSCULAR; INTRAVENOUS; SUBCUTANEOUS PRN
Status: DISCONTINUED | OUTPATIENT
Start: 2019-06-20 | End: 2019-06-20 | Stop reason: SURG

## 2019-06-20 RX ORDER — MORPHINE SULFATE 10 MG/ML
4 INJECTION, SOLUTION INTRAMUSCULAR; INTRAVENOUS
Status: DISCONTINUED | OUTPATIENT
Start: 2019-06-20 | End: 2019-06-21

## 2019-06-20 RX ORDER — SODIUM CHLORIDE, SODIUM GLUCONATE, SODIUM ACETATE, POTASSIUM CHLORIDE AND MAGNESIUM CHLORIDE 526; 502; 368; 37; 30 MG/100ML; MG/100ML; MG/100ML; MG/100ML; MG/100ML
INJECTION, SOLUTION INTRAVENOUS
Status: DISCONTINUED | OUTPATIENT
Start: 2019-06-20 | End: 2019-06-20 | Stop reason: SURG

## 2019-06-20 RX ORDER — ONDANSETRON 2 MG/ML
4 INJECTION INTRAMUSCULAR; INTRAVENOUS EVERY 4 HOURS PRN
Status: DISCONTINUED | OUTPATIENT
Start: 2019-06-20 | End: 2019-07-03 | Stop reason: HOSPADM

## 2019-06-20 RX ORDER — LORAZEPAM 2 MG/ML
1 INJECTION INTRAMUSCULAR ONCE
Status: COMPLETED | OUTPATIENT
Start: 2019-06-20 | End: 2019-06-20

## 2019-06-20 RX ORDER — SODIUM CHLORIDE, SODIUM LACTATE, POTASSIUM CHLORIDE, CALCIUM CHLORIDE 600; 310; 30; 20 MG/100ML; MG/100ML; MG/100ML; MG/100ML
INJECTION, SOLUTION INTRAVENOUS CONTINUOUS
Status: DISPENSED | OUTPATIENT
Start: 2019-06-20 | End: 2019-06-21

## 2019-06-20 RX ORDER — BISACODYL 10 MG
10 SUPPOSITORY, RECTAL RECTAL
Status: DISCONTINUED | OUTPATIENT
Start: 2019-06-20 | End: 2019-07-02

## 2019-06-20 RX ORDER — HYDROMORPHONE HYDROCHLORIDE 2 MG/ML
INJECTION, SOLUTION INTRAMUSCULAR; INTRAVENOUS; SUBCUTANEOUS PRN
Status: DISCONTINUED | OUTPATIENT
Start: 2019-06-20 | End: 2019-06-20 | Stop reason: SURG

## 2019-06-20 RX ORDER — AMOXICILLIN 250 MG
1 CAPSULE ORAL NIGHTLY
Status: DISCONTINUED | OUTPATIENT
Start: 2019-06-20 | End: 2019-06-20

## 2019-06-20 RX ORDER — ENEMA 19; 7 G/133ML; G/133ML
1 ENEMA RECTAL
Status: DISCONTINUED | OUTPATIENT
Start: 2019-06-20 | End: 2019-07-03 | Stop reason: HOSPADM

## 2019-06-20 RX ORDER — ALBUMIN, HUMAN INJ 5% 5 %
SOLUTION INTRAVENOUS PRN
Status: DISCONTINUED | OUTPATIENT
Start: 2019-06-20 | End: 2019-06-20 | Stop reason: SURG

## 2019-06-20 RX ORDER — DOCUSATE SODIUM 50 MG/5ML
100 LIQUID ORAL 2 TIMES DAILY
Status: DISCONTINUED | OUTPATIENT
Start: 2019-06-20 | End: 2019-06-27

## 2019-06-20 RX ORDER — POLYETHYLENE GLYCOL 3350 17 G/17G
1 POWDER, FOR SOLUTION ORAL 2 TIMES DAILY
Status: DISCONTINUED | OUTPATIENT
Start: 2019-06-20 | End: 2019-06-20

## 2019-06-20 RX ORDER — AMOXICILLIN 250 MG
1 CAPSULE ORAL
Status: DISCONTINUED | OUTPATIENT
Start: 2019-06-20 | End: 2019-06-27

## 2019-06-20 RX ORDER — FAMOTIDINE 20 MG/1
20 TABLET, FILM COATED ORAL 2 TIMES DAILY
Status: DISCONTINUED | OUTPATIENT
Start: 2019-06-20 | End: 2019-06-20

## 2019-06-20 RX ORDER — CEFOTETAN DISODIUM 2 G/20ML
INJECTION, POWDER, FOR SOLUTION INTRAMUSCULAR; INTRAVENOUS PRN
Status: DISCONTINUED | OUTPATIENT
Start: 2019-06-20 | End: 2019-06-20 | Stop reason: SURG

## 2019-06-20 RX ADMIN — LORAZEPAM 1 MG: 2 INJECTION INTRAMUSCULAR; INTRAVENOUS at 05:03

## 2019-06-20 RX ADMIN — PROPOFOL 50 MCG/KG/MIN: 10 INJECTION, EMULSION INTRAVENOUS at 20:29

## 2019-06-20 RX ADMIN — LIDOCAINE HYDROCHLORIDE 100 MG: 20 INJECTION, SOLUTION INTRAVENOUS at 14:17

## 2019-06-20 RX ADMIN — SUCCINYLCHOLINE CHLORIDE 100 MG: 20 INJECTION, SOLUTION INTRAMUSCULAR; INTRAVENOUS at 14:17

## 2019-06-20 RX ADMIN — ROCURONIUM BROMIDE 20 MG: 10 INJECTION, SOLUTION INTRAVENOUS at 15:05

## 2019-06-20 RX ADMIN — MORPHINE SULFATE 4 MG: 4 INJECTION INTRAVENOUS at 05:03

## 2019-06-20 RX ADMIN — SODIUM CHLORIDE, SODIUM GLUCONATE, SODIUM ACETATE, POTASSIUM CHLORIDE AND MAGNESIUM CHLORIDE: 526; 502; 368; 37; 30 INJECTION, SOLUTION INTRAVENOUS at 15:35

## 2019-06-20 RX ADMIN — PROPOFOL 40 MCG/KG/MIN: 10 INJECTION, EMULSION INTRAVENOUS at 16:17

## 2019-06-20 RX ADMIN — PIPERACILLIN AND TAZOBACTAM 4.5 G: 4; .5 INJECTION, POWDER, LYOPHILIZED, FOR SOLUTION INTRAVENOUS; PARENTERAL at 20:31

## 2019-06-20 RX ADMIN — PIPERACILLIN AND TAZOBACTAM 4.5 G: 4; .5 INJECTION, POWDER, LYOPHILIZED, FOR SOLUTION INTRAVENOUS; PARENTERAL at 17:26

## 2019-06-20 RX ADMIN — HYDROMORPHONE HYDROCHLORIDE 2 MG: 2 INJECTION, SOLUTION INTRAMUSCULAR; INTRAVENOUS; SUBCUTANEOUS at 15:50

## 2019-06-20 RX ADMIN — CEFTRIAXONE SODIUM 1 G: 1 INJECTION, POWDER, FOR SOLUTION INTRAMUSCULAR; INTRAVENOUS at 05:34

## 2019-06-20 RX ADMIN — AZITHROMYCIN MONOHYDRATE 500 MG: 500 INJECTION, POWDER, LYOPHILIZED, FOR SOLUTION INTRAVENOUS at 06:35

## 2019-06-20 RX ADMIN — FAMOTIDINE 20 MG: 10 INJECTION INTRAVENOUS at 17:28

## 2019-06-20 RX ADMIN — SODIUM CHLORIDE: 9 INJECTION, SOLUTION INTRAVENOUS at 04:00

## 2019-06-20 RX ADMIN — METOCLOPRAMIDE 10 MG: 5 INJECTION, SOLUTION INTRAMUSCULAR; INTRAVENOUS at 05:03

## 2019-06-20 RX ADMIN — METOCLOPRAMIDE 10 MG: 5 INJECTION, SOLUTION INTRAMUSCULAR; INTRAVENOUS at 12:45

## 2019-06-20 RX ADMIN — ALBUMIN (HUMAN) 250 ML: 2.5 SOLUTION INTRAVENOUS at 15:25

## 2019-06-20 RX ADMIN — MORPHINE SULFATE 2 MG: 4 INJECTION INTRAVENOUS at 02:18

## 2019-06-20 RX ADMIN — MORPHINE SULFATE 2 MG: 10 INJECTION INTRAVENOUS at 19:51

## 2019-06-20 RX ADMIN — PROPOFOL 200 MG: 10 INJECTION, EMULSION INTRAVENOUS at 14:17

## 2019-06-20 RX ADMIN — METOCLOPRAMIDE 10 MG: 5 INJECTION, SOLUTION INTRAMUSCULAR; INTRAVENOUS at 17:28

## 2019-06-20 RX ADMIN — MORPHINE SULFATE 4 MG: 4 INJECTION INTRAVENOUS at 07:43

## 2019-06-20 RX ADMIN — SODIUM CHLORIDE: 9 INJECTION, SOLUTION INTRAVENOUS at 12:45

## 2019-06-20 RX ADMIN — FAMOTIDINE 20 MG: 10 INJECTION INTRAVENOUS at 05:03

## 2019-06-20 RX ADMIN — PHENYLEPHRINE HYDROCHLORIDE 100 MCG: 10 INJECTION INTRAVENOUS at 15:10

## 2019-06-20 RX ADMIN — ROCURONIUM BROMIDE 50 MG: 10 INJECTION, SOLUTION INTRAVENOUS at 14:20

## 2019-06-20 RX ADMIN — CEFOTETAN DISODIUM 2 G: 2 INJECTION, POWDER, FOR SOLUTION INTRAMUSCULAR; INTRAVENOUS at 14:20

## 2019-06-20 RX ADMIN — ALBUMIN (HUMAN) 250 ML: 2.5 SOLUTION INTRAVENOUS at 14:45

## 2019-06-20 RX ADMIN — SODIUM CHLORIDE, POTASSIUM CHLORIDE, SODIUM LACTATE AND CALCIUM CHLORIDE: 600; 310; 30; 20 INJECTION, SOLUTION INTRAVENOUS at 16:37

## 2019-06-20 RX ADMIN — SODIUM CHLORIDE, POTASSIUM CHLORIDE, SODIUM LACTATE AND CALCIUM CHLORIDE: 600; 310; 30; 20 INJECTION, SOLUTION INTRAVENOUS at 14:15

## 2019-06-20 RX ADMIN — SODIUM CHLORIDE, SODIUM GLUCONATE, SODIUM ACETATE, POTASSIUM CHLORIDE AND MAGNESIUM CHLORIDE: 526; 502; 368; 37; 30 INJECTION, SOLUTION INTRAVENOUS at 15:04

## 2019-06-20 RX ADMIN — ALBUMIN (HUMAN) 250 ML: 2.5 SOLUTION INTRAVENOUS at 14:29

## 2019-06-20 RX ADMIN — ROCURONIUM BROMIDE 30 MG: 10 INJECTION, SOLUTION INTRAVENOUS at 15:40

## 2019-06-20 RX ADMIN — PHENYLEPHRINE HYDROCHLORIDE 100 MCG: 10 INJECTION INTRAVENOUS at 15:05

## 2019-06-20 NOTE — ANESTHESIA POSTPROCEDURE EVALUATION
Patient: Syed Ladd    Procedure Summary     Date:  06/20/19 Room / Location:  [facility] OR 08 / SURGERY [facility]    Anesthesia Start:  1415 Anesthesia Stop:  1614    Procedure:  EXPLORATORY CELIOTOMY, RE_DO COLO_COLECTOMY (Abdomen) Diagnosis:  (Intra Abdominal Hematoma)    Surgeon:  Dianna Napoles M.D. Responsible Provider:  German Dominguez M.D.    Anesthesia Type:  general ASA Status:  3 - Emergent          Final Anesthesia Type: general  Last vitals  BP   145/78   Temp   98   Pulse   90   Resp   16    SpO2   96 %      Anesthesia Post Evaluation    Patient location during evaluation: ICU  Patient participation: complete - patient cannot participate  Level of consciousness: obtunded/minimal responses    Airway patency: patent  Anesthetic complications: no  Cardiovascular status: stable  Respiratory status: ETT and ventilator  Hydration status: stable    PONV: none  patient was unable to participate

## 2019-06-20 NOTE — PROGRESS NOTES
"Pt frustrated this AM and complaining of increased distention to abdomen. Pt threatening to \"pull out\" NGT and leaving AMA. \"You guys just keep f*cking around and aren't doing anything!\" This RN attempted to calm patient down, provide education, and reinforce importance of leaving NGT in place. This RN was also receiving  anxious phone calls from patient's daughter during this time and expressing concerns about pt's condition. Reassurance provided to daughter - updated on POC and current interventions.     Dr. Napoles paged, notified of events, and provided updates regarding labs/increase in WBCs. New orders received.  "

## 2019-06-20 NOTE — PROGRESS NOTES
"Bedside report received    Patient is awake and alert   Patient pulled his NG tube out at this time  Patient stated \" I dont need it\"  MD made aware and stated \"it's okay to leave out\"   Resp even and unlabored  O2 sat 97% on 3l/nc   Midline abd incision with staples and INES  Left lower quadrant penrose drain covered with CDI drsg  NPO   Denies nausea or vomiting   Call light within reach and bed is locked in lowest position     "

## 2019-06-20 NOTE — ANESTHESIA PREPROCEDURE EVALUATION
Relevant Problems   (+) Colostomy in place (HCC)   (+) Diverticulitis of colon with perforation   (+) Pain following surgery or procedure   (+) Postoperative intra-abdominal abscess       Physical Exam    Airway   Mallampati: II  TM distance: <3 FB  Neck ROM: full       Cardiovascular - normal exam  Rhythm: regular  Rate: normal     Dental - normal exam         Pulmonary   Breath sounds clear to auscultation     Abdominal   Abdomen: tender     Neurological - normal exam                 Anesthesia Plan    ASA 3 - emergent   ASA physical status emergent criteria: sepsis    Plan - general       Airway plan will be ETT        Induction: intravenous          Informed Consent:    Anesthetic plan and risks discussed with patient.

## 2019-06-20 NOTE — ANESTHESIA PROCEDURE NOTES
Arterial Line  Performed by: QUINTON ROE  Authorized by: QUINTON ROE     Start Time:  6/20/2019 3:01 PM  End Time:  6/20/2019 3:02 PM  Patient Location:  OR  Indication: continuous blood pressure monitoring and blood sampling needed    Catheter Size:  20 G  Seldinger Technique?: Yes    Laterality:  Left  Site:  Radial artery  Events: patient tolerated procedure well with no complications

## 2019-06-20 NOTE — CARE PLAN
Problem: Pain Management  Goal: Pain level will decrease to patient's comfort goal  Outcome: PROGRESSING AS EXPECTED  Pain assessed and medicated per MAR.    Problem: Knowledge Deficit  Goal: Knowledge of disease process/condition, treatment plan, diagnostic tests, and medications will improve  Outcome: PROGRESSING AS EXPECTED  POC discussed with patient and family. Questions and concerns addressed.

## 2019-06-20 NOTE — PROGRESS NOTES
"AA&Ox4.   Left nare NGT secured with tape set to LIS. Clear brown output noted.   Cannister replaced by day RN prior to change of shift and replaced again at midnight.  MLI INES and well approximated with staples.  Penrose drain to LLQ with dressing dry and intact.  NPO. Denies nausea.  Abdomen remains firm. Pt complaining of increased distention and requesting PRN IV pain meds q2h. Pt educated on side effects of narcotics.  Pt also expressing wanting to \"pull out\" NGT. This RN educated pt on the need and importance of leaving NGT in place. Pt verbalized understanding and agreeable to \"leave the tube alone.\"  All needs met at this time. Call light within reach.   "

## 2019-06-20 NOTE — ANESTHESIA PROCEDURE NOTES
Airway  Date/Time: 6/20/2019 2:18 PM  Performed by: QUINTON ROE  Authorized by: QUINTON ROE     Location:  OR  Urgency:  Elective  Indications for Airway Management:  Anesthesia  Spontaneous Ventilation: absent    Sedation Level:  Deep  Preoxygenated: Yes    Patient Position:  Sniffing  Final Airway Type:  Endotracheal airway  Final Endotracheal Airway:  ETT  Cuffed: Yes    Technique Used for Successful ETT Placement:  Direct laryngoscopy  Insertion Site:  Oral  Blade Type:  Jimenez  Laryngoscope Blade/Videolaryngoscope Blade Size:  2  ETT Size (mm):  8.5  Measured from:  Teeth  Placement Verified by: auscultation and capnometry    Cormack-Lehane Classification:  Grade I - full view of glottis  Number of Attempts at Approach:  1

## 2019-06-20 NOTE — PROGRESS NOTES
Surgical Progress Note:    POD #7 S/P Resection and closure of Colostomy  Afebrile. Neg N/V, no FLATUS/ +  BM, More Distended. Pain mod controlled, Denies chest pain or SOB.  Ambulating.  CRP stable.  WBC's up. Pt pulled NG tube this AM. Had 2 L out/24hours. Plan return to OR today for Ex Lap  PE:  /80   Pulse 100   Temp 36.9 °C (98.4 °F) (Temporal)   Resp 18   Ht 1.829 m (6')   Wt 82.2 kg (181 lb 3.5 oz)   SpO2 97%   BMI 24.58 kg/m²     I/O:   Intake/Output Summary (Last 24 hours) at 06/14/19 0715  Last data filed at 06/14/19 0400   Gross per 24 hour   Intake             1800 ml   Output              700 ml   Net             1100 ml         Review of Systems   Constitutional: Negative.  Negative for chills and fever.   Respiratory: Negative for shortness of breath.    Cardiovascular: Negative for chest pain.   Gastrointestinal: Negative for nausea and vomiting.        no flatus/+ multiple stool   Genitourinary: Negative.      Physical Exam   Constitutional:  appears well-developed.   Neck: Neck supple.   Cardiovascular: Normal rate.    Pulmonary/Chest: Effort normal.   Abdominal: Soft.  exhibits no distension. Appropriate tenderness.   Incisions clean, dry, and intact  Penrose drain in place  Musculoskeletal: Normal range of motion.   Neurological:  alert.   Skin:  Warm and dry.   Extremities: najera, no edema    Labs:  Recent Labs      06/18/19   0518  06/19/19   0333  06/20/19   0253   WBC  20.2*  22.6*  25.7*   RBC  3.40*  3.05*  3.27*   HEMOGLOBIN  9.2*  8.3*  9.3*   HEMATOCRIT  29.3*  26.7*  29.1*   MCV  86.2  87.5  89.0   MCH  27.1  27.2  28.4   RDW  47.9  49.6  50.0   PLATELETCT  342  368  506*   MPV  9.9  9.8  9.9   NEUTSPOLYS  88.10*  85.00*  86.20*   LYMPHOCYTES  5.10*  6.30*  5.10*   MONOCYTES  6.00  7.80  7.20   EOSINOPHILS  0.00  0.00  0.10   BASOPHILS  0.20  0.20  0.20     Recent Labs      06/18/19   0518  06/19/19   0333  06/20/19   0253   SODIUM  139  139  139   POTASSIUM  4.3  4.4  3.8    CHLORIDE  104  107  105   CO2  22  22  23   GLUCOSE  110*  119*  118*   BUN  14  13  14         A/P:     - NPO, Awaiting return of GI function  - IS Q One hour while awake  - Ambulate.    - Pain controlled.  Cont IV pain medication  - Labs noted, recheck in am  - chest xray, rt lower lobe infiltrate    - serial CRP, trending up  - DVT propholaxis, sequentials and ambulate  - Low urine output.  Cont, to monitor  - return to OR for exlap    Colostomy in place (HCC)- (present on admission)   Assessment & Plan    6/13/19  Resection and closure of colostomy  6/19 having BM's     Diverticulitis of colon with perforation- (present on admission)   Assessment & Plan    6/19 rt lower lobe infiltrate  Start IV antibiotics         Discussed with Patient, RN and Dr. Dony Lee, A.P.N.  [facility]  [phone]

## 2019-06-20 NOTE — DISCHARGE PLANNING
RN requested LSW to provide emotional support to pt's daughters in waiting area. LSW attempted to meet them, daughters not present. LSW informed evening   of situation. LSW informed bedside RN.

## 2019-06-20 NOTE — ANESTHESIA TIME REPORT
Anesthesia Start and Stop Event Times     Date Time Event    6/20/2019 1415 Anesthesia Start     1614 Anesthesia Stop        Responsible Staff  06/20/19    Name Role Begin End    German Dominguez M.D. Anesth 1415 1614        Preop Diagnosis (Free Text):  Pre-op Diagnosis     Bowel Obstruction        Preop Diagnosis (Codes):  Diagnosis Information     Diagnosis Code(s):         Post op Diagnosis  Intra-abdominal hematoma      Premium Reason  A. 3PM - 7AM    Comments:

## 2019-06-21 ENCOUNTER — APPOINTMENT (OUTPATIENT)
Dept: RADIOLOGY | Facility: MEDICAL CENTER | Age: 59
DRG: 329 | End: 2019-06-21
Attending: SURGERY
Payer: MEDICAID

## 2019-06-21 PROBLEM — S36.892A MESENTERIC HEMATOMA: Status: ACTIVE | Noted: 2019-06-21

## 2019-06-21 PROBLEM — J95.821 RESPIRATORY FAILURE FOLLOWING TRAUMA AND SURGERY (HCC): Status: ACTIVE | Noted: 2019-06-21

## 2019-06-21 LAB
ACTION RANGE TRIGGERED IACRT: NO
ALBUMIN SERPL BCP-MCNC: 2.3 G/DL (ref 3.2–4.9)
ALBUMIN/GLOB SERPL: 1.4 G/DL
ALP SERPL-CCNC: 52 U/L (ref 30–99)
ALT SERPL-CCNC: 13 U/L (ref 2–50)
ANION GAP SERPL CALC-SCNC: 11 MMOL/L (ref 0–11.9)
ANISOCYTOSIS BLD QL SMEAR: ABNORMAL
AST SERPL-CCNC: 20 U/L (ref 12–45)
BASE EXCESS BLDA CALC-SCNC: -8 MMOL/L (ref -4–3)
BASOPHILS # BLD AUTO: 0 % (ref 0–1.8)
BASOPHILS # BLD AUTO: 0 % (ref 0–1.8)
BASOPHILS # BLD: 0 K/UL (ref 0–0.12)
BASOPHILS # BLD: 0 K/UL (ref 0–0.12)
BILIRUB SERPL-MCNC: 8.6 MG/DL (ref 0.1–1.5)
BODY TEMPERATURE: ABNORMAL DEGREES
BUN SERPL-MCNC: 21 MG/DL (ref 8–22)
BURR CELLS BLD QL SMEAR: NORMAL
CA-I BLD ISE-SCNC: 1.02 MMOL/L (ref 1.1–1.3)
CALCIUM SERPL-MCNC: 7.1 MG/DL (ref 8.5–10.5)
CHLORIDE SERPL-SCNC: 111 MMOL/L (ref 96–112)
CHOLEST SERPL-MCNC: 48 MG/DL (ref 100–199)
CO2 BLDA-SCNC: 16 MMOL/L (ref 20–33)
CO2 SERPL-SCNC: 19 MMOL/L (ref 20–33)
CREAT SERPL-MCNC: 1.16 MG/DL (ref 0.5–1.4)
CRP SERPL HS-MCNC: 26.78 MG/DL (ref 0–0.75)
EOSINOPHIL # BLD AUTO: 0 K/UL (ref 0–0.51)
EOSINOPHIL # BLD AUTO: 0 K/UL (ref 0–0.51)
EOSINOPHIL NFR BLD: 0 % (ref 0–6.9)
EOSINOPHIL NFR BLD: 0 % (ref 0–6.9)
ERYTHROCYTE [DISTWIDTH] IN BLOOD BY AUTOMATED COUNT: 48.2 FL (ref 35.9–50)
ERYTHROCYTE [DISTWIDTH] IN BLOOD BY AUTOMATED COUNT: 49.1 FL (ref 35.9–50)
GLOBULIN SER CALC-MCNC: 1.7 G/DL (ref 1.9–3.5)
GLUCOSE BLD-MCNC: 102 MG/DL (ref 65–99)
GLUCOSE BLD-MCNC: 113 MG/DL (ref 65–99)
GLUCOSE BLD-MCNC: 115 MG/DL (ref 65–99)
GLUCOSE SERPL-MCNC: 141 MG/DL (ref 65–99)
HCO3 BLDA-SCNC: 15.5 MMOL/L (ref 17–25)
HCT VFR BLD AUTO: 23.1 % (ref 42–52)
HCT VFR BLD AUTO: 27 % (ref 42–52)
HCT VFR BLD AUTO: 29.2 % (ref 42–52)
HCT VFR BLD CALC: 19 % (ref 42–52)
HGB BLD-MCNC: 6.5 G/DL (ref 14–18)
HGB BLD-MCNC: 7.5 G/DL (ref 14–18)
HGB BLD-MCNC: 8 G/DL (ref 14–18)
HGB BLD-MCNC: 8.9 G/DL (ref 14–18)
HGB BLD-MCNC: 9.2 G/DL (ref 14–18)
INST. QUALIFIED PATIENT IIQPT: YES
LYMPHOCYTES # BLD AUTO: 1.19 K/UL (ref 1–4.8)
LYMPHOCYTES # BLD AUTO: 2.05 K/UL (ref 1–4.8)
LYMPHOCYTES NFR BLD: 11.6 % (ref 22–41)
LYMPHOCYTES NFR BLD: 6.9 % (ref 22–41)
MAGNESIUM SERPL-MCNC: 2 MG/DL (ref 1.5–2.5)
MANUAL DIFF BLD: NORMAL
MANUAL DIFF BLD: NORMAL
MCH RBC QN AUTO: 27.1 PG (ref 27–33)
MCH RBC QN AUTO: 28.3 PG (ref 27–33)
MCHC RBC AUTO-ENTMCNC: 31.5 G/DL (ref 33.7–35.3)
MCHC RBC AUTO-ENTMCNC: 33 G/DL (ref 33.7–35.3)
MCV RBC AUTO: 85.9 FL (ref 81.4–97.8)
MCV RBC AUTO: 86 FL (ref 81.4–97.8)
MICROCYTES BLD QL SMEAR: ABNORMAL
MONOCYTES # BLD AUTO: 1.66 K/UL (ref 0–0.85)
MONOCYTES # BLD AUTO: 1.89 K/UL (ref 0–0.85)
MONOCYTES NFR BLD AUTO: 10.7 % (ref 0–13.4)
MONOCYTES NFR BLD AUTO: 9.6 % (ref 0–13.4)
MORPHOLOGY BLD-IMP: NORMAL
MORPHOLOGY BLD-IMP: NORMAL
NEUTROPHILS # BLD AUTO: 13.75 K/UL (ref 1.82–7.42)
NEUTROPHILS # BLD AUTO: 14.45 K/UL (ref 1.82–7.42)
NEUTROPHILS NFR BLD: 75 % (ref 44–72)
NEUTROPHILS NFR BLD: 77.4 % (ref 44–72)
NEUTS BAND NFR BLD MANUAL: 2.7 % (ref 0–10)
NEUTS BAND NFR BLD MANUAL: 6.1 % (ref 0–10)
NRBC # BLD AUTO: 0 K/UL
NRBC # BLD AUTO: 0 K/UL
NRBC BLD-RTO: 0 /100 WBC
NRBC BLD-RTO: 0 /100 WBC
PCO2 BLDA: 23.3 MMHG (ref 26–37)
PH BLDA: 7.43 [PH] (ref 7.4–7.5)
PHOSPHATE SERPL-MCNC: 4.3 MG/DL (ref 2.5–4.5)
PLATELET # BLD AUTO: 469 K/UL (ref 164–446)
PLATELET # BLD AUTO: 517 K/UL (ref 164–446)
PLATELET BLD QL SMEAR: NORMAL
PLATELET BLD QL SMEAR: NORMAL
PMV BLD AUTO: 10 FL (ref 9–12.9)
PMV BLD AUTO: 9.8 FL (ref 9–12.9)
PO2 BLDA: 71 MMHG (ref 64–87)
POIKILOCYTOSIS BLD QL SMEAR: NORMAL
POLYCHROMASIA BLD QL SMEAR: NORMAL
POTASSIUM BLD-SCNC: 3.3 MMOL/L (ref 3.6–5.5)
POTASSIUM SERPL-SCNC: 3.9 MMOL/L (ref 3.6–5.5)
PROT SERPL-MCNC: 4 G/DL (ref 6–8.2)
RBC # BLD AUTO: 3.14 M/UL (ref 4.7–6.1)
RBC # BLD AUTO: 3.4 M/UL (ref 4.7–6.1)
RBC BLD AUTO: NORMAL
RBC BLD AUTO: PRESENT
ROULEAUX BLD QL SMEAR: SLIGHT
SAO2 % BLDA: 95 % (ref 93–99)
SODIUM BLD-SCNC: 143 MMOL/L (ref 135–145)
SODIUM SERPL-SCNC: 141 MMOL/L (ref 135–145)
SPECIMEN DRAWN FROM PATIENT: ABNORMAL
TRIGL SERPL-MCNC: 134 MG/DL (ref 0–149)
WBC # BLD AUTO: 17.3 K/UL (ref 4.8–10.8)
WBC # BLD AUTO: 17.7 K/UL (ref 4.8–10.8)

## 2019-06-21 PROCEDURE — 700111 HCHG RX REV CODE 636 W/ 250 OVERRIDE (IP): Performed by: SURGERY

## 2019-06-21 PROCEDURE — 700105 HCHG RX REV CODE 258: Performed by: SURGERY

## 2019-06-21 PROCEDURE — 85027 COMPLETE CBC AUTOMATED: CPT | Mod: 91

## 2019-06-21 PROCEDURE — 83735 ASSAY OF MAGNESIUM: CPT

## 2019-06-21 PROCEDURE — 71045 X-RAY EXAM CHEST 1 VIEW: CPT

## 2019-06-21 PROCEDURE — 82465 ASSAY BLD/SERUM CHOLESTEROL: CPT

## 2019-06-21 PROCEDURE — 85014 HEMATOCRIT: CPT

## 2019-06-21 PROCEDURE — 85018 HEMOGLOBIN: CPT

## 2019-06-21 PROCEDURE — 86140 C-REACTIVE PROTEIN: CPT

## 2019-06-21 PROCEDURE — 3E0436Z INTRODUCTION OF NUTRITIONAL SUBSTANCE INTO CENTRAL VEIN, PERCUTANEOUS APPROACH: ICD-10-PCS | Performed by: SURGERY

## 2019-06-21 PROCEDURE — 94003 VENT MGMT INPAT SUBQ DAY: CPT

## 2019-06-21 PROCEDURE — 82962 GLUCOSE BLOOD TEST: CPT

## 2019-06-21 PROCEDURE — 80053 COMPREHEN METABOLIC PANEL: CPT

## 2019-06-21 PROCEDURE — 700101 HCHG RX REV CODE 250: Performed by: SURGERY

## 2019-06-21 PROCEDURE — 770022 HCHG ROOM/CARE - ICU (200)

## 2019-06-21 PROCEDURE — 85007 BL SMEAR W/DIFF WBC COUNT: CPT | Mod: 91

## 2019-06-21 PROCEDURE — 84478 ASSAY OF TRIGLYCERIDES: CPT

## 2019-06-21 PROCEDURE — 94150 VITAL CAPACITY TEST: CPT

## 2019-06-21 PROCEDURE — 99291 CRITICAL CARE FIRST HOUR: CPT | Performed by: SURGERY

## 2019-06-21 PROCEDURE — 84100 ASSAY OF PHOSPHORUS: CPT

## 2019-06-21 RX ORDER — SODIUM CHLORIDE 9 MG/ML
500 INJECTION, SOLUTION INTRAVENOUS ONCE
Status: COMPLETED | OUTPATIENT
Start: 2019-06-21 | End: 2019-06-21

## 2019-06-21 RX ORDER — SODIUM CHLORIDE, SODIUM LACTATE, POTASSIUM CHLORIDE, CALCIUM CHLORIDE 600; 310; 30; 20 MG/100ML; MG/100ML; MG/100ML; MG/100ML
INJECTION, SOLUTION INTRAVENOUS CONTINUOUS
Status: DISCONTINUED | OUTPATIENT
Start: 2019-06-21 | End: 2019-06-26

## 2019-06-21 RX ORDER — SODIUM CHLORIDE, SODIUM LACTATE, POTASSIUM CHLORIDE, AND CALCIUM CHLORIDE .6; .31; .03; .02 G/100ML; G/100ML; G/100ML; G/100ML
250 INJECTION, SOLUTION INTRAVENOUS
Status: DISCONTINUED | OUTPATIENT
Start: 2019-06-21 | End: 2019-06-30

## 2019-06-21 RX ORDER — SODIUM CHLORIDE 9 MG/ML
1000 INJECTION, SOLUTION INTRAVENOUS ONCE
Status: COMPLETED | OUTPATIENT
Start: 2019-06-21 | End: 2019-06-21

## 2019-06-21 RX ADMIN — MORPHINE SULFATE 4 MG: 10 INJECTION INTRAVENOUS at 07:26

## 2019-06-21 RX ADMIN — MORPHINE SULFATE 4 MG: 10 INJECTION INTRAVENOUS at 00:16

## 2019-06-21 RX ADMIN — MORPHINE SULFATE 4 MG: 10 INJECTION INTRAVENOUS at 18:15

## 2019-06-21 RX ADMIN — PIPERACILLIN AND TAZOBACTAM 4.5 G: 4; .5 INJECTION, POWDER, LYOPHILIZED, FOR SOLUTION INTRAVENOUS; PARENTERAL at 22:11

## 2019-06-21 RX ADMIN — SODIUM CHLORIDE 500 ML: 9 INJECTION, SOLUTION INTRAVENOUS at 06:38

## 2019-06-21 RX ADMIN — POTASSIUM CHLORIDE: 2 INJECTION, SOLUTION, CONCENTRATE INTRAVENOUS at 19:38

## 2019-06-21 RX ADMIN — SODIUM CHLORIDE, POTASSIUM CHLORIDE, SODIUM LACTATE AND CALCIUM CHLORIDE: 600; 310; 30; 20 INJECTION, SOLUTION INTRAVENOUS at 07:48

## 2019-06-21 RX ADMIN — SODIUM CHLORIDE, POTASSIUM CHLORIDE, SODIUM LACTATE AND CALCIUM CHLORIDE: 600; 310; 30; 20 INJECTION, SOLUTION INTRAVENOUS at 14:41

## 2019-06-21 RX ADMIN — METOCLOPRAMIDE 10 MG: 5 INJECTION, SOLUTION INTRAMUSCULAR; INTRAVENOUS at 12:15

## 2019-06-21 RX ADMIN — METOCLOPRAMIDE 10 MG: 5 INJECTION, SOLUTION INTRAMUSCULAR; INTRAVENOUS at 23:58

## 2019-06-21 RX ADMIN — METOCLOPRAMIDE 10 MG: 5 INJECTION, SOLUTION INTRAMUSCULAR; INTRAVENOUS at 00:07

## 2019-06-21 RX ADMIN — MORPHINE SULFATE 4 MG: 10 INJECTION INTRAVENOUS at 14:59

## 2019-06-21 RX ADMIN — MORPHINE SULFATE 4 MG: 10 INJECTION INTRAVENOUS at 13:20

## 2019-06-21 RX ADMIN — PROPOFOL 50 MCG/KG/MIN: 10 INJECTION, EMULSION INTRAVENOUS at 06:01

## 2019-06-21 RX ADMIN — MORPHINE SULFATE 4 MG: 10 INJECTION INTRAVENOUS at 11:51

## 2019-06-21 RX ADMIN — PROPOFOL 30 MCG/KG/MIN: 10 INJECTION, EMULSION INTRAVENOUS at 02:32

## 2019-06-21 RX ADMIN — PIPERACILLIN AND TAZOBACTAM 4.5 G: 4; .5 INJECTION, POWDER, LYOPHILIZED, FOR SOLUTION INTRAVENOUS; PARENTERAL at 05:58

## 2019-06-21 RX ADMIN — FENTANYL CITRATE: 50 INJECTION, SOLUTION INTRAMUSCULAR; INTRAVENOUS at 22:40

## 2019-06-21 RX ADMIN — METOCLOPRAMIDE 10 MG: 5 INJECTION, SOLUTION INTRAMUSCULAR; INTRAVENOUS at 05:57

## 2019-06-21 RX ADMIN — SODIUM CHLORIDE, POTASSIUM CHLORIDE, SODIUM LACTATE AND CALCIUM CHLORIDE: 600; 310; 30; 20 INJECTION, SOLUTION INTRAVENOUS at 00:23

## 2019-06-21 RX ADMIN — PIPERACILLIN AND TAZOBACTAM 4.5 G: 4; .5 INJECTION, POWDER, LYOPHILIZED, FOR SOLUTION INTRAVENOUS; PARENTERAL at 13:18

## 2019-06-21 RX ADMIN — SODIUM CHLORIDE 1000 ML: 9 INJECTION, SOLUTION INTRAVENOUS at 02:32

## 2019-06-21 RX ADMIN — METOCLOPRAMIDE 10 MG: 5 INJECTION, SOLUTION INTRAMUSCULAR; INTRAVENOUS at 17:26

## 2019-06-21 RX ADMIN — FAMOTIDINE 20 MG: 10 INJECTION INTRAVENOUS at 05:57

## 2019-06-21 RX ADMIN — MORPHINE SULFATE 4 MG: 10 INJECTION INTRAVENOUS at 09:07

## 2019-06-21 RX ADMIN — MORPHINE SULFATE 4 MG: 10 INJECTION INTRAVENOUS at 17:08

## 2019-06-21 ASSESSMENT — PULMONARY FUNCTION TESTS: FVC: .9

## 2019-06-21 NOTE — CARE PLAN
Problem: Nutritional:  Goal: Achieve adequate nutritional intake  Advance diet as feasible and patient will consume >50% of meals   Outcome: MET Date Met: 06/21/19  TPN started

## 2019-06-21 NOTE — PROGRESS NOTES
Called Dr. Dubon to update on patients low urinary output. New orders received to give 500ml bolus. New ordered implemented.

## 2019-06-21 NOTE — PROGRESS NOTES
2 RN skin check complete with FLAQUITO Mir.  Devices in place ETT, OG, RLQ JOHAN, Prevena, potter, NIBP, 2 peripheral IVs, wrist restraints, SCD's. .   Skin assessed under the following devices ETT, OG, RLQ JOHAN, Prevena, potter, NIBP, 2 peripheral IVs, wrist restraints, SCD's.   Preventative measures in place including skin assessed under devices, ETT repositioned q2hrs, turned q2hrs, use of two pillows for repositioning, heels floated.  Following areas of concern:   · RLQ surgical incision with staples and JOHAN  * L mid abdominal incsion with staples  * midline prevena     The following interventions in place skin assessed under devices, ETT repositioned q2hrs, turned q2hrs, use of two pillows for repositioning, heels floated, mepilex on sacrum, pillows under elbows.     Wound consult placedYES/NO: N\A    Wound reported YES/NO: N\A  Appropriate LDAs opened YES/NO: N\A

## 2019-06-21 NOTE — PROGRESS NOTES
0945: Interdisciplinary team at bedside. OK to extubate. No plans to decompress bowel at this time, do not place NG tube per Dr Ponce. Orders received for TPN and prn LR boluses for low UOP. OK to d/c ART line.    0950: Pt's daughter verbalized concerns regarding pt's previous reaction to ativan. Ativan allergy added to list.    1030: Pt extubated. Tolerated well.

## 2019-06-21 NOTE — PROGRESS NOTES
Patient arrived at bedside with MD and 2 operating room RNs. Bedside report completed and head-to-toe assessment completed. 2 RN skin check completed. Vital signs stable on arrival. RT at bedside.     Dr. Napoles in waiting room updating family regarding patient's condition, and social work paged to provide support resources as needed. See social work note.

## 2019-06-21 NOTE — PROGRESS NOTES
Pt transferred to S118. Family updated on POC. All questions answered. Pt transferred to ICU bed. CCT calling for a cot for family member to sleep on. White board updated.

## 2019-06-21 NOTE — PROGRESS NOTES
Pharmacy TPN Day # 1       2019    Dosing Weight   78 kg     New start TPN        TPN goal: 0930-9260 kcal/day including 1.5 gm/kg/day Protein  TPN indication: Prolonged NPO with bowel resection         Pertinent PMH: Admitted 19 for ileostomy take down. Subsequently developed an ileus and was without oral intake for 7 days. Taken back to the OR on 19 and found to have colonic obstruction secondary to mesenteric hematoma and complicating loop of small bowel. Anastomotic take down was completed and obstruction resolved. Expected prolonged NPO with ileus following second procedure.     Temp (24hrs), Av.9 °C (98.4 °F), Min:36.5 °C (97.7 °F), Max:37.4 °C (99.3 °F)  .  Recent Labs      19   0333  19   0253  19   0430   SODIUM  139  139  141   POTASSIUM  4.4  3.8  3.9   CHLORIDE  107  105  111   CO2  22  23  19*   BUN  13  14  21   CREATININE  0.80  0.93  1.16   GLUCOSE  119*  118*  141*   CALCIUM  8.4*  8.6  7.1*   ASTSGOT  10*   --   20   ALTSGPT  10   --   13   ALBUMIN  2.9*   --   2.3*   TBILIRUBIN  4.3*   --   8.6*   PHOSPHORUS   --   3.0  4.3   MAGNESIUM   --   2.2  2.0     Accu-Checks  No results for input(s): POCGLUCOSE in the last 72 hours.    Vitals:    19 1325 19 1610 19 1615 19 0400   BP: 128/80  (!) 174/73    Weight:  83.5 kg (184 lb 1.4 oz)  91.8 kg (202 lb 6.1 oz)   Height:           Intake/Output Summary (Last 24 hours) at 19 1140  Last data filed at 19 1000   Gross per 24 hour   Intake          9197.06 ml   Output             1530 ml   Net          7667.06 ml       Orders Placed This Encounter   Procedures   • Diet NPO     Standing Status:   Standing     Number of Occurrences:   1     Order Specific Question:   Restrict to:     Answer:   Strict [1]         TPN for past 72 hours (Show up to 3 orders; newest on the left.)     Start date and time   2019      TPN Central Line Formulation [[id number]]    Order Status  Active        Base    Clinisol 15%  60 g    dextrose 70%  135 g    fat emulsions 20%  30 g       Additives    potassium phosphates  10 mmol    potassium chloride  60 mEq    sodium acetate  150 mEq    sodium chloride  80 mEq    magnesium sulfate  12 mEq    calcium GLUConate  4.65 mEq    M.T.E. -5 Adult  1 mL    M.V.I. ADULT  10 mL    famotidine  40 mg       Energy Contribution    Proteins  --    Dextrose  --    Lipids  --    Total  0 kcal       Electrolyte Ion Calculated Amount    Sodium  --    Potassium  --    Calcium  --    Magnesium  --    Aluminum  --    Phosphate  --    Chloride  --    Acetate  --       Other    Total Protein  --    Total Protein/kg  --    Glucose Infusion Rate  --    Osmolarity  --    Volume  1,800 mL    Rate  75 mL/hr    Dosing Weight  91.8 kg    Infusion Site  Central            This formula provides:  % kcal as lipids = 30%  Grams protein/kg = 0.8  Non-protein calories = 759 kcal  Kcals/kg = 13 kcal/kg  Total daily calories = 999 kcal    Comments:  New TPN start.   Anticipated prolonged NPO.   Start at TPN at 50%, advance as tolerated.   Low risk to refeed.     Macronutrients:   · Protein: Upper limit of normal protein goal to help with recovery from surgery.   · CHO: GIR 1.3 mg/kg/min. Monitor for hyperglycemia.  · Lipids: Unlikely to be FA deficient. Monitor for PNALD.     Micronutrients:  · Sodium: LR equivalence. More acetate given chloride of 111 and CO2 of 19.   · Potassium: Modest starting replacement. May see some shift.   · No outside magnesium or phosphorous replaced.     Other  · Fluids: TIVF rate of 150 ml/hr, resuscitation following surgery. TPN at 75 ml/hr for stability.   · Famotidine moved into TPN.   · No history of diabetes. May require some insulin given BG of 140 without CHO supplement. Initiate insulin if blood glucose greater than 180 mg/dL. Goal FSBG less than 180 mg/dL.       Senthil Stephens, PharmD

## 2019-06-21 NOTE — PROGRESS NOTES
Pt seen and examined  Still intubated  Requiring fluid resuscitation  Will third space based on bowel mobilization  Ordered TPN given probable prolonged ileus post op and 8 days of no nutrition to this point  On broad spectrum abx - GS on hematoma shows multiple bacteria  JOHAN serosang  Appreciate ICU assistance

## 2019-06-21 NOTE — CARE PLAN
Problem: Infection  Goal: Will remain free from infection  Outcome: PROGRESSING AS EXPECTED  Utilized hand hygiene before and after patient contact. Accessed IV hubs/ports by scrubbing with alcohol swab for 30 seconds before using. Daily labs and vital signs assessed for signs of infections. Patient assessed for signs and symptoms of new onset or increasing infection. Mouth care provided every 2 hours.  VAP bundle in place.     Problem: Skin Integrity  Goal: Risk for impaired skin integrity will decrease  Outcome: PROGRESSING AS EXPECTED  2 RN skin assessment completed qshift. Repositioned q2hrs with two pillows, heels floated, skin assessed under devices, ETT repositioned q2hrs.

## 2019-06-21 NOTE — OP REPORT
DATE OF SERVICE:  06/20/2019    PREOPERATIVE DIAGNOSIS:  Abdominal distention, possible anastomotic leak   versus bowel obstruction.    POSTOPERATIVE DIAGNOSES:  Intraabdominal hematoma and colonic obstruction.    PROCEDURES PERFORMED:  Exploratory celiotomy, washout of intra-abdominal   hematoma and revision of colocolostomy, and central line placement with a   triple lumen catheter, 7-Egyptian catheter in the left subclavian vein;   mobilization of the splenic flexure, and a rigid sigmoidoscopy.    SURGEON:  Dianna Napoles MD    ASSISTANT:  BRYANNA Arceo    ANESTHESIA:  General endotracheal.    ANESTHESIOLOGIST:  German Dominguez MD    INDICATIONS:  The patient is a 59-year-old gentleman who several months ago   had perforated diverticulitis.  He had an end colostomy and Rain's   procedure performed at that time.  He subsequently has recovered and was   brought to the operating room 1 week ago for anastomotic closure.  He   subsequently developed a postoperative ileus on postop day #3 that was   progressively worsening.  He started to have some liquid stool out of his   abdomen, became markedly distended.  He ultimately had increase in his white   count and CRP; however, his procalcitonin was negative.  X-rays demonstrated   what appeared to be a colonic ileus.  There was no free air.  He subsequently   had continued worsening and it was elected to bring him back to the operating   room at this time for exploration to evaluate for the source of his ongoing   ileus.    FINDINGS:  A pelvic hematoma, which appeared to be emanating from the   mesentery of his colon where the previous colostomy had been removed and the   anastomosis had been performed.  The anastomosis was intact.  There was a loop   of small bowel that had herniated behind the anastomosis; however, that was   causing the obstruction.  The anastomosis ended being taken down, but because   there was no leak, it was elected to put him back  together.  An end-to-end   stapled anastomosis was done with a 29 mm EEA.  All of the bowel appeared to   be viable.    DESCRIPTION OF PROCEDURE:  After the patient was identified and consented, he   was brought to the operating room and placed in the supine position.  The   patient underwent general endotracheal anesthetic clearance.  He was placed in   lithotomy position.  His abdomen and perineum were prepped and draped in   sterile fashion.  His previous midline incision was reentered.  Upon entering   the abdominal cavity, the aforementioned findings were noted.  The mesenteric   hematoma was evacuated and the evidence of the bowel obstruction was noted.    The omentum and small bowel were removed from behind it.  In the process of   doing this, it was necessary to take down the anastomosis.  This was resected   with a MARINA stapler and a TA stapler proximally and distally.  Once this was   completed, the splenic flexure was mobilized to allow for an easy anastomosis.    Prior to doing the anastomosis, the proximal colon was decompressed with a   24-Montserratian red [place] to decrease the colonic air and the small bowel was   decompressed back through the NG tube.  An end-to-end anastomosis was   completed with a 29 EEA stapler.  A rigid sigmoidoscopy was performed and no   evidence of air leak was noted.  A JOHAN was left in the pelvis.  Seprafilm was   placed.  The incision was closed with #1 Vicryl for the fascia.  Skin was   closed with staples.  After this was done, his left chest was prepped and   draped in sterile fashion.  An 18-gauge thin-walled needle was introduced in   the left subclavian vein.  Wire was passed, insertion site was enlarged and   dilated.  Catheter was passed to 20 cm, secured with 3-0 silk.  All 3 ports   flushed and teresa easily.       ____________________________________     MD PRITI WRIGHT / BORIS    DD:  06/20/2019 15:55:35  DT:  06/20/2019 18:18:09    D#:  [id number]  Job#:   [id number]    cc: QUINTON ROE MD

## 2019-06-21 NOTE — PROGRESS NOTES
Trauma / Surgical Daily Progress Note    Date of Service  6/21/2019    Chief Complaint  59 y.o. male admitted 6/13/2019 with DIVERTICULITIS     Interval Events  POD 1 reexploration  Vent.  Wean to extubate  GCS 11t  Multiple fluid bolus to support bp  HCT 27.  Transfused 2 u yesterday.    TPN  LR at 150  Zosyn.  Family conference        Review of Systems  Review of Systems     Vital Signs for last 24 hours  Temp:  [36.6 °C (97.8 °F)-37.9 °C (100.2 °F)] 37.9 °C (100.2 °F)  Pulse:  [] 107  Resp:  [0-33] 23  SpO2:  [94 %-100 %] 94 %    Hemodynamic parameters for last 24 hours  CVP:  [2 MM HG-10 MM HG] 3 MM HG    Respiratory Data     Respiration: (!) 23, Pulse Oximetry: 94 %, O2 Daily Delivery Respiratory : Silicone Nasal Cannula     Work Of Breathing / Effort: Mild  RUL Breath Sounds: Clear, RML Breath Sounds: Diminished, RLL Breath Sounds: Diminished, BARBARA Breath Sounds: Clear, LLL Breath Sounds: Diminished    Physical Exam  Physical Exam   HENT:   Head: Normocephalic.   Eyes: Pupils are equal, round, and reactive to light. No scleral icterus.   Neck: No JVD present.   Cardiovascular: Regular rhythm.    Pulmonary/Chest: No respiratory distress. He has no rales.   Abdominal: He exhibits distension.   Neurological: He is alert.   Skin: Skin is warm and dry. He is not diaphoretic.       Laboratory  Recent Results (from the past 24 hour(s))   CBC WITH DIFFERENTIAL    Collection Time: 06/21/19 12:20 AM   Result Value Ref Range    WBC 17.7 (H) 4.8 - 10.8 K/uL    RBC 3.40 (L) 4.70 - 6.10 M/uL    Hemoglobin 9.2 (L) 14.0 - 18.0 g/dL    Hematocrit 29.2 (L) 42.0 - 52.0 %    MCV 85.9 81.4 - 97.8 fL    MCH 27.1 27.0 - 33.0 pg    MCHC 31.5 (L) 33.7 - 35.3 g/dL    RDW 48.2 35.9 - 50.0 fL    Platelet Count 517 (H) 164 - 446 K/uL    MPV 9.8 9.0 - 12.9 fL    Neutrophils-Polys 75.00 (H) 44.00 - 72.00 %    Lymphocytes 11.60 (L) 22.00 - 41.00 %    Monocytes 10.70 0.00 - 13.40 %    Eosinophils 0.00 0.00 - 6.90 %    Basophils 0.00 0.00  - 1.80 %    Nucleated RBC 0.00 /100 WBC    Neutrophils (Absolute) 13.75 (H) 1.82 - 7.42 K/uL    Lymphs (Absolute) 2.05 1.00 - 4.80 K/uL    Monos (Absolute) 1.89 (H) 0.00 - 0.85 K/uL    Eos (Absolute) 0.00 0.00 - 0.51 K/uL    Baso (Absolute) 0.00 0.00 - 0.12 K/uL    NRBC (Absolute) 0.00 K/uL    Anisocytosis 2+     Microcytosis 2+    DIFFERENTIAL MANUAL    Collection Time: 06/21/19 12:20 AM   Result Value Ref Range    Bands-Stabs 2.70 0.00 - 10.00 %    Manual Diff Status PERFORMED    PERIPHERAL SMEAR REVIEW    Collection Time: 06/21/19 12:20 AM   Result Value Ref Range    Peripheral Smear Review see below    PLATELET ESTIMATE    Collection Time: 06/21/19 12:20 AM   Result Value Ref Range    Plt Estimation Normal    MORPHOLOGY    Collection Time: 06/21/19 12:20 AM   Result Value Ref Range    RBC Morphology Present     Polychromia 1+     Poikilocytosis 1+     Echinocytes 1+     Rouleaux Slight    CBC with Differential: Tomorrow AM    Collection Time: 06/21/19  4:30 AM   Result Value Ref Range    WBC 17.3 (H) 4.8 - 10.8 K/uL    RBC 3.14 (L) 4.70 - 6.10 M/uL    Hemoglobin 8.9 (L) 14.0 - 18.0 g/dL    Hematocrit 27.0 (L) 42.0 - 52.0 %    MCV 86.0 81.4 - 97.8 fL    MCH 28.3 27.0 - 33.0 pg    MCHC 33.0 (L) 33.7 - 35.3 g/dL    RDW 49.1 35.9 - 50.0 fL    Platelet Count 469 (H) 164 - 446 K/uL    MPV 10.0 9.0 - 12.9 fL    Neutrophils-Polys 77.40 (H) 44.00 - 72.00 %    Lymphocytes 6.90 (L) 22.00 - 41.00 %    Monocytes 9.60 0.00 - 13.40 %    Eosinophils 0.00 0.00 - 6.90 %    Basophils 0.00 0.00 - 1.80 %    Nucleated RBC 0.00 /100 WBC    Neutrophils (Absolute) 14.45 (H) 1.82 - 7.42 K/uL    Lymphs (Absolute) 1.19 1.00 - 4.80 K/uL    Monos (Absolute) 1.66 (H) 0.00 - 0.85 K/uL    Eos (Absolute) 0.00 0.00 - 0.51 K/uL    Baso (Absolute) 0.00 0.00 - 0.12 K/uL    NRBC (Absolute) 0.00 K/uL   Comp Metabolic Panel (CMP): Tomorrow AM    Collection Time: 06/21/19  4:30 AM   Result Value Ref Range    Sodium 141 135 - 145 mmol/L    Potassium 3.9  3.6 - 5.5 mmol/L    Chloride 111 96 - 112 mmol/L    Co2 19 (L) 20 - 33 mmol/L    Anion Gap 11.0 0.0 - 11.9    Glucose 141 (H) 65 - 99 mg/dL    Bun 21 8 - 22 mg/dL    Creatinine 1.16 0.50 - 1.40 mg/dL    Calcium 7.1 (L) 8.5 - 10.5 mg/dL    AST(SGOT) 20 12 - 45 U/L    ALT(SGPT) 13 2 - 50 U/L    Alkaline Phosphatase 52 30 - 99 U/L    Total Bilirubin 8.6 (H) 0.1 - 1.5 mg/dL    Albumin 2.3 (L) 3.2 - 4.9 g/dL    Total Protein 4.0 (L) 6.0 - 8.2 g/dL    Globulin 1.7 (L) 1.9 - 3.5 g/dL    A-G Ratio 1.4 g/dL   CRP QUANTITIVE (NON-CARDIAC)    Collection Time: 06/21/19  4:30 AM   Result Value Ref Range    Stat C-Reactive Protein 26.78 (H) 0.00 - 0.75 mg/dL   ESTIMATED GFR    Collection Time: 06/21/19  4:30 AM   Result Value Ref Range    GFR If African American >60 >60 mL/min/1.73 m 2    GFR If Non African American >60 >60 mL/min/1.73 m 2   MAGNESIUM    Collection Time: 06/21/19  4:30 AM   Result Value Ref Range    Magnesium 2.0 1.5 - 2.5 mg/dL   PHOSPHORUS    Collection Time: 06/21/19  4:30 AM   Result Value Ref Range    Phosphorus 4.3 2.5 - 4.5 mg/dL   CHOLESTEROL    Collection Time: 06/21/19  4:30 AM   Result Value Ref Range    Cholesterol,Tot 48 (L) 100 - 199 mg/dL   TRIGLYCERIDE    Collection Time: 06/21/19  4:30 AM   Result Value Ref Range    Triglycerides 134 0 - 149 mg/dL   DIFFERENTIAL MANUAL    Collection Time: 06/21/19  4:30 AM   Result Value Ref Range    Bands-Stabs 6.10 0.00 - 10.00 %    Manual Diff Status PERFORMED    PERIPHERAL SMEAR REVIEW    Collection Time: 06/21/19  4:30 AM   Result Value Ref Range    Peripheral Smear Review see below    PLATELET ESTIMATE    Collection Time: 06/21/19  4:30 AM   Result Value Ref Range    Plt Estimation #CAC    MORPHOLOGY    Collection Time: 06/21/19  4:30 AM   Result Value Ref Range    RBC Morphology #CRI    Hemoglobin - Q6 hours x4    Collection Time: 06/21/19 11:45 AM   Result Value Ref Range    Hemoglobin 8.0 (L) 14.0 - 18.0 g/dL   ACCU-CHEK GLUCOSE    Collection Time:  06/21/19 11:50 AM   Result Value Ref Range    Glucose - Accu-Ck 115 (H) 65 - 99 mg/dL   HEMOGLOBIN AND HEMATOCRIT    Collection Time: 06/21/19  5:30 PM   Result Value Ref Range    Hemoglobin 7.5 (L) 14.0 - 18.0 g/dL    Hematocrit 23.1 (L) 42.0 - 52.0 %   ACCU-CHEK GLUCOSE    Collection Time: 06/21/19  5:32 PM   Result Value Ref Range    Glucose - Accu-Ck 113 (H) 65 - 99 mg/dL       Fluids    Intake/Output Summary (Last 24 hours) at 06/21/19 2026  Last data filed at 06/21/19 1800   Gross per 24 hour   Intake          4610.43 ml   Output             1185 ml   Net          3425.43 ml       Core Measures & Quality Metrics  Labs reviewed, Medications reviewed and Radiology images reviewed  Flor catheter: Critically Ill - Requiring Accurate Measurement of Urinary Output      DVT Prophylaxis: Enoxaparin (Lovenox)  DVT prophylaxis - mechanical: SCDs          YADIEL Score  ETOH Screening    Assessment/Plan  Colostomy in place (HCC)- (present on admission)   Assessment & Plan    6/13/19  Resection and closure of colostomy  6/19 having BM's  6/20  Explored for bowel obstruction and infected hematoma.  Anastomosis revised.       Diverticulitis of colon with perforation- (present on admission)   Assessment & Plan    6/19 rt lower lobe infiltrate  Start IV antibiotics         Discussed patient condition with RN, RT and Pharmacy.  CRITICAL CARE TIME EXCLUDING PROCEDURES: 36    Minutes managing mechanical ventilation, intravenous antibiotics, fluid bolus, family conference.  Intraabdominal infection

## 2019-06-21 NOTE — CARE PLAN
Problem: Ventilation Defect:  Goal: Ability to achieve and maintain unassisted ventilation or tolerate decreased levels of ventilator support    Intervention: Support and monitor invasive and noninvasive mechanical ventilation  Adult Ventilation Update    Total Vent Days: 2    Patient Lines/Drains/Airways Status    Active Airway     Name: Placement date: Placement time: Site: Days:    Airway ETT Oral 8.5 06/20/19   1423   Oral   2                      (ASV) % MIN VOL: 130 (06/21/19 0235)    Cough: Productive (06/21/19 0235)  Sputum Amount: Small (06/21/19 0235)  Sputum Color: Clear (06/21/19 0235)  Sputum Consistency: Thin (06/21/19 0235)    Mobility  Level of Mobility: Level I (06/20/19 2000)  Activity Performed: Unable to mobilize (06/20/19 2000)  Reason Not Mobilized: Bed rest (06/20/19 2000)

## 2019-06-21 NOTE — DIETARY
Nutrition Support Assessment     Nutrition services: Day 8 of admit.  60 yo male with admitting diagnosis: diverticulitis    Evaluation/Assessment:  1. Respiratory distress - vent. Ok to extubate per rounds this morning.  2. Admit for colostomy closure. 6/13 colostomy closure, 6/20 exploratory celiotomy - re-do colectomy.  3. TPN for prolonged ileus post op    Estimated Nutritional Needs: based on: height 6', weight 8.2 kg (admit), IBW 80.74 kg, BMI 24.58    Calculation/Equation: MSJ x 1.2 = 2006 kcals  Calories/day: 2000 - 2200 kcals (24 - 27 kcals/kg)  Protein/day: 99 - 115 g (1.2 - 1.4 g/kg)     Malnutrition Risk: na    Recommendations/Plan:  1. TPN per RPH to meet estimated nutritional needs  2. Po diet when appropriate

## 2019-06-21 NOTE — PROGRESS NOTES
Paged Dr. Napoles regarding low urinary output orders receieved to start monitoring CVP, increase fluids to 150ml/hr and give 1 L ns bolus. New orders implemented.

## 2019-06-22 ENCOUNTER — APPOINTMENT (OUTPATIENT)
Dept: RADIOLOGY | Facility: MEDICAL CENTER | Age: 59
DRG: 329 | End: 2019-06-22
Attending: SURGERY
Payer: MEDICAID

## 2019-06-22 PROBLEM — R91.8 LUNG INFILTRATE: Status: ACTIVE | Noted: 2019-06-22

## 2019-06-22 LAB
ALBUMIN SERPL BCP-MCNC: 2.5 G/DL (ref 3.2–4.9)
ALBUMIN/GLOB SERPL: 1.3 G/DL
ALP SERPL-CCNC: 64 U/L (ref 30–99)
ALT SERPL-CCNC: 11 U/L (ref 2–50)
ANION GAP SERPL CALC-SCNC: 7 MMOL/L (ref 0–11.9)
AST SERPL-CCNC: 18 U/L (ref 12–45)
BASOPHILS # BLD AUTO: 0.1 % (ref 0–1.8)
BASOPHILS # BLD: 0.02 K/UL (ref 0–0.12)
BILIRUB SERPL-MCNC: 9.3 MG/DL (ref 0.1–1.5)
BUN SERPL-MCNC: 18 MG/DL (ref 8–22)
CALCIUM SERPL-MCNC: 7.6 MG/DL (ref 8.5–10.5)
CHLORIDE SERPL-SCNC: 109 MMOL/L (ref 96–112)
CO2 SERPL-SCNC: 25 MMOL/L (ref 20–33)
CREAT SERPL-MCNC: 0.85 MG/DL (ref 0.5–1.4)
CRP SERPL HS-MCNC: 31.44 MG/DL (ref 0–0.75)
EKG IMPRESSION: NORMAL
EOSINOPHIL # BLD AUTO: 0.04 K/UL (ref 0–0.51)
EOSINOPHIL NFR BLD: 0.3 % (ref 0–6.9)
ERYTHROCYTE [DISTWIDTH] IN BLOOD BY AUTOMATED COUNT: 48 FL (ref 35.9–50)
GLOBULIN SER CALC-MCNC: 1.9 G/DL (ref 1.9–3.5)
GLUCOSE BLD-MCNC: 104 MG/DL (ref 65–99)
GLUCOSE BLD-MCNC: 127 MG/DL (ref 65–99)
GLUCOSE BLD-MCNC: 127 MG/DL (ref 65–99)
GLUCOSE SERPL-MCNC: 138 MG/DL (ref 65–99)
HCT VFR BLD AUTO: 20.8 % (ref 42–52)
HCT VFR BLD AUTO: 22.1 % (ref 42–52)
HCT VFR BLD AUTO: 23.2 % (ref 42–52)
HCT VFR BLD AUTO: 23.5 % (ref 42–52)
HGB BLD-MCNC: 6.8 G/DL (ref 14–18)
HGB BLD-MCNC: 7.3 G/DL (ref 14–18)
HGB BLD-MCNC: 7.3 G/DL (ref 14–18)
HGB BLD-MCNC: 7.7 G/DL (ref 14–18)
IMM GRANULOCYTES # BLD AUTO: 0.15 K/UL (ref 0–0.11)
IMM GRANULOCYTES NFR BLD AUTO: 0.9 % (ref 0–0.9)
LYMPHOCYTES # BLD AUTO: 0.94 K/UL (ref 1–4.8)
LYMPHOCYTES NFR BLD: 5.9 % (ref 22–41)
MAGNESIUM SERPL-MCNC: 2.4 MG/DL (ref 1.5–2.5)
MCH RBC QN AUTO: 28.2 PG (ref 27–33)
MCHC RBC AUTO-ENTMCNC: 33 G/DL (ref 33.7–35.3)
MCV RBC AUTO: 85.3 FL (ref 81.4–97.8)
MONOCYTES # BLD AUTO: 1.31 K/UL (ref 0–0.85)
MONOCYTES NFR BLD AUTO: 8.3 % (ref 0–13.4)
NEUTROPHILS # BLD AUTO: 13.35 K/UL (ref 1.82–7.42)
NEUTROPHILS NFR BLD: 84.5 % (ref 44–72)
NRBC # BLD AUTO: 0.02 K/UL
NRBC BLD-RTO: 0.1 /100 WBC
PHOSPHATE SERPL-MCNC: 1.7 MG/DL (ref 2.5–4.5)
PLATELET # BLD AUTO: 374 K/UL (ref 164–446)
PMV BLD AUTO: 9.9 FL (ref 9–12.9)
POTASSIUM SERPL-SCNC: 3.6 MMOL/L (ref 3.6–5.5)
PROT SERPL-MCNC: 4.4 G/DL (ref 6–8.2)
RBC # BLD AUTO: 2.59 M/UL (ref 4.7–6.1)
SODIUM SERPL-SCNC: 141 MMOL/L (ref 135–145)
WBC # BLD AUTO: 15.8 K/UL (ref 4.8–10.8)

## 2019-06-22 PROCEDURE — 82962 GLUCOSE BLOOD TEST: CPT | Mod: 91

## 2019-06-22 PROCEDURE — 36430 TRANSFUSION BLD/BLD COMPNT: CPT

## 2019-06-22 PROCEDURE — 700111 HCHG RX REV CODE 636 W/ 250 OVERRIDE (IP): Performed by: SURGERY

## 2019-06-22 PROCEDURE — 71045 X-RAY EXAM CHEST 1 VIEW: CPT

## 2019-06-22 PROCEDURE — 80053 COMPREHEN METABOLIC PANEL: CPT

## 2019-06-22 PROCEDURE — 85014 HEMATOCRIT: CPT | Mod: 91

## 2019-06-22 PROCEDURE — A9270 NON-COVERED ITEM OR SERVICE: HCPCS | Performed by: SURGERY

## 2019-06-22 PROCEDURE — 84100 ASSAY OF PHOSPHORUS: CPT

## 2019-06-22 PROCEDURE — 93010 ELECTROCARDIOGRAM REPORT: CPT | Performed by: INTERNAL MEDICINE

## 2019-06-22 PROCEDURE — 770022 HCHG ROOM/CARE - ICU (200)

## 2019-06-22 PROCEDURE — 700101 HCHG RX REV CODE 250: Performed by: SURGERY

## 2019-06-22 PROCEDURE — 83735 ASSAY OF MAGNESIUM: CPT

## 2019-06-22 PROCEDURE — 700105 HCHG RX REV CODE 258: Performed by: SURGERY

## 2019-06-22 PROCEDURE — 700102 HCHG RX REV CODE 250 W/ 637 OVERRIDE(OP): Performed by: SURGERY

## 2019-06-22 PROCEDURE — 93005 ELECTROCARDIOGRAM TRACING: CPT | Performed by: SURGERY

## 2019-06-22 PROCEDURE — 85018 HEMOGLOBIN: CPT

## 2019-06-22 PROCEDURE — 85025 COMPLETE CBC W/AUTO DIFF WBC: CPT

## 2019-06-22 PROCEDURE — 86923 COMPATIBILITY TEST ELECTRIC: CPT

## 2019-06-22 PROCEDURE — P9016 RBC LEUKOCYTES REDUCED: HCPCS

## 2019-06-22 PROCEDURE — 86140 C-REACTIVE PROTEIN: CPT

## 2019-06-22 PROCEDURE — 99291 CRITICAL CARE FIRST HOUR: CPT | Performed by: SURGERY

## 2019-06-22 RX ADMIN — METOCLOPRAMIDE 10 MG: 5 INJECTION, SOLUTION INTRAMUSCULAR; INTRAVENOUS at 17:24

## 2019-06-22 RX ADMIN — PIPERACILLIN AND TAZOBACTAM 4.5 G: 4; .5 INJECTION, POWDER, LYOPHILIZED, FOR SOLUTION INTRAVENOUS; PARENTERAL at 21:12

## 2019-06-22 RX ADMIN — PIPERACILLIN AND TAZOBACTAM 4.5 G: 4; .5 INJECTION, POWDER, LYOPHILIZED, FOR SOLUTION INTRAVENOUS; PARENTERAL at 05:37

## 2019-06-22 RX ADMIN — ENOXAPARIN SODIUM 30 MG: 100 INJECTION SUBCUTANEOUS at 17:24

## 2019-06-22 RX ADMIN — PIPERACILLIN AND TAZOBACTAM 4.5 G: 4; .5 INJECTION, POWDER, LYOPHILIZED, FOR SOLUTION INTRAVENOUS; PARENTERAL at 12:38

## 2019-06-22 RX ADMIN — METOCLOPRAMIDE 10 MG: 5 INJECTION, SOLUTION INTRAMUSCULAR; INTRAVENOUS at 11:25

## 2019-06-22 RX ADMIN — POTASSIUM CHLORIDE: 2 INJECTION, SOLUTION, CONCENTRATE INTRAVENOUS at 19:59

## 2019-06-22 RX ADMIN — METOCLOPRAMIDE 10 MG: 5 INJECTION, SOLUTION INTRAMUSCULAR; INTRAVENOUS at 05:38

## 2019-06-22 RX ADMIN — SODIUM CHLORIDE, POTASSIUM CHLORIDE, SODIUM LACTATE AND CALCIUM CHLORIDE: 600; 310; 30; 20 INJECTION, SOLUTION INTRAVENOUS at 11:28

## 2019-06-22 RX ADMIN — BISACODYL 10 MG RECTAL SUPPOSITORY 10 MG: at 20:02

## 2019-06-22 RX ADMIN — POTASSIUM PHOSPHATE, MONOBASIC AND POTASSIUM PHOSPHATE, DIBASIC 30 MMOL: 224; 236 INJECTION, SOLUTION, CONCENTRATE INTRAVENOUS at 08:51

## 2019-06-22 NOTE — PROGRESS NOTES
Devices in place RLQ JOHAN, Pravena machine, potter, BP cuff, 2 peripheral IVs, SCD's. .   Skin assessed under the above devices.  Preventative measures in place including skin assessed under devices, turned q2hrs, use of two pillows for repositioning, heels floated.   Following areas of concern:   · RLQ surgical incision with staples and JOHAN   * L mid abdominal incsion with staples   * midline prevena   The following interventions in place skin assessed under devices, turned q2hrs, use of two pillows for repositioning, heels floated, mepilex on sacrum, pillows under elbows.   Wound consult placedYES/NO: N\A   Wound reported YES/NO: N\A   Appropriate LDAs opened YES/NO: N\A

## 2019-06-22 NOTE — PROGRESS NOTES
Trauma / Surgical Daily Progress Note    Date of Service  2019    Chief Complaint  59 y.o. male admitted 2019 with DIVERTICULITIS     Interval Events  Fentanyl drip  Transfused  Confused last joshua  tmax 100  OOB  Lovenox    Reduce IVF  Day 3 zosyn day 4 ab      Review of Systems  Review of Systems     Vital Signs for last 24 hours  Temp:  [36.7 °C (98 °F)-37.9 °C (100.2 °F)] 37.1 °C (98.7 °F)  Pulse:  [] 90  Resp:  [10-53] 53  BP: (135)/(72) 135/72  SpO2:  [90 %-100 %] 97 %    Hemodynamic parameters for last 24 hours       Respiratory Data     Respiration: (!) 53, Pulse Oximetry: 97 %, O2 Daily Delivery Respiratory : Silicone Nasal Cannula     Work Of Breathing / Effort: Moderate  RUL Breath Sounds: Clear, RML Breath Sounds: Clear, RLL Breath Sounds: Diminished, BARBARA Breath Sounds: Clear, LLL Breath Sounds: Diminished    Physical Exam  Physical Exam   Constitutional: He appears listless.   HENT:   Head: Normocephalic.   Eyes: Pupils are equal, round, and reactive to light.   Neck: No JVD present.   Pulmonary/Chest: No respiratory distress. He has no wheezes.   Abdominal: He exhibits distension.   Musculoskeletal: He exhibits no edema.   Neurological: He appears listless.   Skin: He is not diaphoretic.       Laboratory  Recent Results (from the past 24 hour(s))   HEMOGLOBIN AND HEMATOCRIT    Collection Time: 19 12:00 AM   Result Value Ref Range    Hemoglobin 6.8 (L) 14.0 - 18.0 g/dL    Hematocrit 20.8 (L) 42.0 - 52.0 %   ACCU-CHEK GLUCOSE    Collection Time: 19  4:47 AM   Result Value Ref Range    Glucose - Accu-Ck 127 (H) 65 - 99 mg/dL   EKG    Collection Time: 19  5:09 AM   Result Value Ref Range    Report       [facility]    Test Date:  2019  Pt Name:    ELIZABETH GOMEZ                 Department: 19  MRN:        [id number]                      Room:       [facility]  Gender:     Male                         Technician: TEAGAN  :        1960                   Requested  By:JON ARMAS  Order #:    [id number]                    Reading MD: Alejandra Vernon    Measurements  Intervals                                Axis  Rate:       98                           P:          4  NJ:         151                          QRS:        30  QRSD:       97                           T:          -5  QT:         346  QTc:        442    Interpretive Statements  SINUS RHYTHM  Compared to ECG 03/06/2019 15:10:29  No significant changes    Electronically Signed On 6- 8:46:19 PDT by Alejandra Vernon     CBC WITH DIFFERENTIAL    Collection Time: 06/22/19  5:30 AM   Result Value Ref Range    WBC 15.8 (H) 4.8 - 10.8 K/uL    RBC 2.59 (L) 4.70 - 6.10 M/uL    Hemoglobin 7.3 (L) 14.0 - 18.0 g/dL    Hematocrit 22.1 (L) 42.0 - 52.0 %    MCV 85.3 81.4 - 97.8 fL    MCH 28.2 27.0 - 33.0 pg    MCHC 33.0 (L) 33.7 - 35.3 g/dL    RDW 48.0 35.9 - 50.0 fL    Platelet Count 374 164 - 446 K/uL    MPV 9.9 9.0 - 12.9 fL    Neutrophils-Polys 84.50 (H) 44.00 - 72.00 %    Lymphocytes 5.90 (L) 22.00 - 41.00 %    Monocytes 8.30 0.00 - 13.40 %    Eosinophils 0.30 0.00 - 6.90 %    Basophils 0.10 0.00 - 1.80 %    Immature Granulocytes 0.90 0.00 - 0.90 %    Nucleated RBC 0.10 /100 WBC    Neutrophils (Absolute) 13.35 (H) 1.82 - 7.42 K/uL    Lymphs (Absolute) 0.94 (L) 1.00 - 4.80 K/uL    Monos (Absolute) 1.31 (H) 0.00 - 0.85 K/uL    Eos (Absolute) 0.04 0.00 - 0.51 K/uL    Baso (Absolute) 0.02 0.00 - 0.12 K/uL    Immature Granulocytes (abs) 0.15 (H) 0.00 - 0.11 K/uL    NRBC (Absolute) 0.02 K/uL   CRP QUANTITIVE (NON-CARDIAC)    Collection Time: 06/22/19  5:30 AM   Result Value Ref Range    Stat C-Reactive Protein 31.44 (H) 0.00 - 0.75 mg/dL   COMP METABOLIC PANEL    Collection Time: 06/22/19  5:30 AM   Result Value Ref Range    Sodium 141 135 - 145 mmol/L    Potassium 3.6 3.6 - 5.5 mmol/L    Chloride 109 96 - 112 mmol/L    Co2 25 20 - 33 mmol/L    Anion Gap 7.0 0.0 - 11.9    Glucose 138 (H) 65 - 99 mg/dL    Bun 18 8 - 22 mg/dL     Creatinine 0.85 0.50 - 1.40 mg/dL    Calcium 7.6 (L) 8.5 - 10.5 mg/dL    AST(SGOT) 18 12 - 45 U/L    ALT(SGPT) 11 2 - 50 U/L    Alkaline Phosphatase 64 30 - 99 U/L    Total Bilirubin 9.3 (H) 0.1 - 1.5 mg/dL    Albumin 2.5 (L) 3.2 - 4.9 g/dL    Total Protein 4.4 (L) 6.0 - 8.2 g/dL    Globulin 1.9 1.9 - 3.5 g/dL    A-G Ratio 1.3 g/dL   MAGNESIUM    Collection Time: 06/22/19  5:30 AM   Result Value Ref Range    Magnesium 2.4 1.5 - 2.5 mg/dL   PHOSPHORUS    Collection Time: 06/22/19  5:30 AM   Result Value Ref Range    Phosphorus 1.7 (L) 2.5 - 4.5 mg/dL   ESTIMATED GFR    Collection Time: 06/22/19  5:30 AM   Result Value Ref Range    GFR If African American >60 >60 mL/min/1.73 m 2    GFR If Non African American >60 >60 mL/min/1.73 m 2   ACCU-CHEK GLUCOSE    Collection Time: 06/22/19 11:36 AM   Result Value Ref Range    Glucose - Accu-Ck 127 (H) 65 - 99 mg/dL   HEMOGLOBIN AND HEMATOCRIT    Collection Time: 06/22/19 11:40 AM   Result Value Ref Range    Hemoglobin 7.7 (L) 14.0 - 18.0 g/dL    Hematocrit 23.5 (L) 42.0 - 52.0 %   HEMOGLOBIN AND HEMATOCRIT    Collection Time: 06/22/19  5:30 PM   Result Value Ref Range    Hemoglobin 7.3 (L) 14.0 - 18.0 g/dL    Hematocrit 23.2 (L) 42.0 - 52.0 %   ACCU-CHEK GLUCOSE    Collection Time: 06/22/19  5:32 PM   Result Value Ref Range    Glucose - Accu-Ck 104 (H) 65 - 99 mg/dL       Fluids    Intake/Output Summary (Last 24 hours) at 06/22/19 1851  Last data filed at 06/22/19 1800   Gross per 24 hour   Intake           4406.5 ml   Output             2165 ml   Net           2241.5 ml       Core Measures & Quality Metrics  Labs reviewed, Medications reviewed and Radiology images reviewed  Flor catheter: Critically Ill - Requiring Accurate Measurement of Urinary Output      DVT Prophylaxis: Enoxaparin (Lovenox)  DVT prophylaxis - mechanical: SCDs          YADIEL Score  ETOH Screening    Assessment/Plan  Colostomy in place (HCC)- (present on admission)   Assessment & Plan    6/13/19  Resection  and closure of colostomy  6/19 having BM's  6/20  Explored for bowel obstruction and infected hematoma.  Anastomosis revised.    6/22 some distention, post operative ileus         Discussed patient condition with RN, RT and Pharmacy.  CRITICAL CARE TIME EXCLUDING PROCEDURES: 35    Minutes.  Decision making of high complexity.  I reviewed clinical labs, trends and orders for follow up.  Review of imaging,reports, consultant documentation .  Utilization of the information in todays decision making.   I evaluated the patient condition at bedside and discussed the daily plan(s) with available nursing staff, pharmacists on rounds. Managing blood transfusion, intraabdominal hematoma and infection, intravenous antibiotics, respiratory failure and lung infiltrates.

## 2019-06-22 NOTE — PROGRESS NOTES
Pharmacy TPN Day # 2       2019    Dosing Weight   78 kg      TPN currently providing 50% of goal      TPN goal: 6775-2626 kcal/day including 1.5 gm/kg/day Protein      TPN indication: Prolonged NPO with bowel resection    Pertinent PMH: Admitted 19 for ileostomy takedown. Patient subsequently developed an ileus and was without oral intake for 7 days. Patient taken back to the OR on 19 and found to have colonic obstruction secondary to mesenteric hematoma and herniated loop of small bowel. Anastomotic takedown was completed and obstruction resolved. Expected prolonged NPO with ileus following second procedure, thus patient initiated on parenteral nutrition pending return of bowel function.     Temp (24hrs), Av.4 °C (99.4 °F), Min:37.2 °C (98.9 °F), Max:37.9 °C (100.2 °F)    Recent Labs      19   0253  19   0430  19   0530   SODIUM  139  141  141   POTASSIUM  3.8  3.9  3.6   CHLORIDE  105  111  109   CO2  23  19*  25   BUN  14  21  18   CREATININE  0.93  1.16  0.85   GLUCOSE  118*  141*  138*   CALCIUM  8.6  7.1*  7.6*   ASTSGOT   --   20  18   ALTSGPT   --   13  11   ALBUMIN   --   2.3*  2.5*   TBILIRUBIN   --   8.6*  9.3*   PHOSPHORUS  3.0  4.3  1.7*   MAGNESIUM  2.2  2.0  2.4     Accu-Checks  Recent Labs      19   1150  19   1732  19   0447   POCGLUCOSE  115*  113*  127*       Vitals:    19 1615 19 0400 19 0400 19 0515   BP: (!) 174/73   135/72   Weight:  91.8 kg (202 lb 6.1 oz) 94 kg (207 lb 3.7 oz)    Height:           Intake/Output Summary (Last 24 hours) at 19 1145  Last data filed at 19 1100   Gross per 24 hour   Intake          3724.75 ml   Output             2065 ml   Net          1659.75 ml       Orders Placed This Encounter   Procedures   • Diet NPO     Standing Status:   Standing     Number of Occurrences:   1     Order Specific Question:   Restrict to:     Answer:   Strict [1]         TPN for past 72 hours (Show  up to 3 orders; newest on the left.)     Start date and time   06/21/2019 2000      TPN Central Line Formulation [[id number]]    Order Status  Active    Last Given  06/21/2019 1938       Base    Clinisol 15%  60 g    dextrose 70%  135 g    fat emulsions 20%  30 g       Additives    potassium phosphates  10 mmol    potassium chloride  60 mEq    sodium acetate  150 mEq    sodium chloride  80 mEq    magnesium sulfate  12 mEq    calcium GLUConate  4.65 mEq    M.T.E. -5 Adult  1 mL    M.V.I. ADULT  10 mL    famotidine  40 mg       Other    Volume  1,800 mL    Rate  75 mL/hr    Dosing Weight  91.8 kg    Infusion Site  [facility]          This formula provides:  % kcal as lipids = 30  Grams protein/kg = 0.8  Non-protein calories = 759  Kcals/kg = 13  Total daily calories = 999    Comments:  1. Patient initiated on TPN at 50% of goal kcal last evening and noted to have tolerated extubation yesterday. Patient remains on LR with request for TPN + IVF rate of 125 mL/hr per provider. Nutrition assessment per RD reviewed and TPN provisions in line with RD recommendations. Continues on piperacillin/tazobactam, E. Coli and Group D enterococcus noted from wound culture. Patient received blood transfusion this AM for drop in Hgb.  2. Micronutrients/Electrolytes: Some concern for refeeding following initiation of TPN based on low phos level this AM, supplemental KPhos 30 mmol IV x1 ordered per protocol. Sodium in TPN remains LR equivalent, CO2 on labs improved with additional acetate salts.  3. Macronutrients/glycemic control: BG well controlled following TPN start, all BG <180 mg/dL. Will keep macronutrients at 50% today d/t concerns for refeeding, can likely advance tomorrow pending repeat labs.  4. Will continue TPN at 50% of estimated nutritional requirements and follow repeat labs for further advances as tolerated.    Pharmacy will continue to follow.     nAnmarie Poole, BrianD

## 2019-06-22 NOTE — PROGRESS NOTES
Devices in place  RLQ JOHAN, Prevena, potter, NIBP, 2 peripheral IVs, SCD's. .              Skin assessed under the following devices RLQ JOHAN, Prevena, potter, NIBP, 2 peripheral IVs, SCD's.              Preventative measures in place including skin assessed under devices, turned q2hrs, use of two pillows for repositioning, heels floated.  Following areas of concern:   ·           RLQ surgical incision with staples and JOHAN  * L mid abdominal incsion with staples  * midline prevena     The following interventions in place skin assessed under devices, turned q2hrs, use of two pillows for repositioning, heels floated, mepilex on sacrum, pillows under elbows.      Wound consult placedYES/NO: N\A    Wound reported YES/NO: N\A  Appropriate LDAs opened YES/NO: N\A

## 2019-06-22 NOTE — CARE PLAN
Problem: Hyperinflation:  Goal: Prevent or improve atelectasis    Intervention: Instruct incentive spirometry usage  60% of pred is 1860. Best IS value today is 800.  Intervention: Perform hyperinflation therapy as indicated by assessment  IS QID

## 2019-06-22 NOTE — CARE PLAN
Problem: Mobility  Goal: Risk for activity intolerance will decrease    Intervention: Assess and monitor signs of activity intolerance  Pt mobilized as tolerated      Problem: Skin Integrity  Goal: Risk for impaired skin integrity will decrease    Intervention: Implement precautions to protect skin integrity in collaboration with the interdisciplinary team  Pt turns self

## 2019-06-22 NOTE — CARE PLAN
Problem: Psychosocial Needs:  Goal: Level of anxiety will decrease  Outcome: PROGRESSING AS EXPECTED  Discussed POC with patient and family at bedside, whiteboard updated, all questions answered and patient encouraged to express concerns and questions.     Problem: Mobility  Goal: Risk for activity intolerance will decrease  Outcome: PROGRESSING AS EXPECTED  Educate patient and family on need for assisstance when getting in and out of bed. Provide rest periods. Ensure room is clutter free when mobilizing.

## 2019-06-23 ENCOUNTER — APPOINTMENT (OUTPATIENT)
Dept: RADIOLOGY | Facility: MEDICAL CENTER | Age: 59
DRG: 329 | End: 2019-06-23
Attending: SURGERY
Payer: MEDICAID

## 2019-06-23 PROBLEM — D50.0 BLOOD LOSS ANEMIA: Status: ACTIVE | Noted: 2019-06-23

## 2019-06-23 LAB
ALBUMIN SERPL BCP-MCNC: 2.2 G/DL (ref 3.2–4.9)
ALBUMIN/GLOB SERPL: 1 G/DL
ALP SERPL-CCNC: 68 U/L (ref 30–99)
ALT SERPL-CCNC: 13 U/L (ref 2–50)
ANION GAP SERPL CALC-SCNC: 7 MMOL/L (ref 0–11.9)
AST SERPL-CCNC: 19 U/L (ref 12–45)
BACTERIA SPEC ANAEROBE CULT: ABNORMAL
BACTERIA SPEC ANAEROBE CULT: ABNORMAL
BASOPHILS # BLD AUTO: 0.2 % (ref 0–1.8)
BASOPHILS # BLD: 0.03 K/UL (ref 0–0.12)
BILIRUB SERPL-MCNC: 9.7 MG/DL (ref 0.1–1.5)
BUN SERPL-MCNC: 15 MG/DL (ref 8–22)
CALCIUM SERPL-MCNC: 7.6 MG/DL (ref 8.5–10.5)
CHLORIDE SERPL-SCNC: 108 MMOL/L (ref 96–112)
CO2 SERPL-SCNC: 26 MMOL/L (ref 20–33)
CREAT SERPL-MCNC: 0.76 MG/DL (ref 0.5–1.4)
EOSINOPHIL # BLD AUTO: 0.14 K/UL (ref 0–0.51)
EOSINOPHIL NFR BLD: 0.8 % (ref 0–6.9)
ERYTHROCYTE [DISTWIDTH] IN BLOOD BY AUTOMATED COUNT: 50 FL (ref 35.9–50)
GLOBULIN SER CALC-MCNC: 2.1 G/DL (ref 1.9–3.5)
GLUCOSE SERPL-MCNC: 135 MG/DL (ref 65–99)
HCT VFR BLD AUTO: 22.1 % (ref 42–52)
HCT VFR BLD AUTO: 22.4 % (ref 42–52)
HGB BLD-MCNC: 7.4 G/DL (ref 14–18)
HGB BLD-MCNC: 7.4 G/DL (ref 14–18)
IMM GRANULOCYTES # BLD AUTO: 0.26 K/UL (ref 0–0.11)
IMM GRANULOCYTES NFR BLD AUTO: 1.6 % (ref 0–0.9)
LYMPHOCYTES # BLD AUTO: 1.28 K/UL (ref 1–4.8)
LYMPHOCYTES NFR BLD: 7.7 % (ref 22–41)
MAGNESIUM SERPL-MCNC: 2.2 MG/DL (ref 1.5–2.5)
MCH RBC QN AUTO: 29.1 PG (ref 27–33)
MCHC RBC AUTO-ENTMCNC: 33.5 G/DL (ref 33.7–35.3)
MCV RBC AUTO: 87 FL (ref 81.4–97.8)
MONOCYTES # BLD AUTO: 0.94 K/UL (ref 0–0.85)
MONOCYTES NFR BLD AUTO: 5.7 % (ref 0–13.4)
NEUTROPHILS # BLD AUTO: 13.89 K/UL (ref 1.82–7.42)
NEUTROPHILS NFR BLD: 84 % (ref 44–72)
NRBC # BLD AUTO: 0 K/UL
NRBC BLD-RTO: 0 /100 WBC
PHOSPHATE SERPL-MCNC: 2.2 MG/DL (ref 2.5–4.5)
PLATELET # BLD AUTO: 371 K/UL (ref 164–446)
PMV BLD AUTO: 10.1 FL (ref 9–12.9)
POTASSIUM SERPL-SCNC: 3.6 MMOL/L (ref 3.6–5.5)
PROT SERPL-MCNC: 4.3 G/DL (ref 6–8.2)
RBC # BLD AUTO: 2.54 M/UL (ref 4.7–6.1)
SIGNIFICANT IND 70042: ABNORMAL
SITE SITE: ABNORMAL
SODIUM SERPL-SCNC: 141 MMOL/L (ref 135–145)
SOURCE SOURCE: ABNORMAL
WBC # BLD AUTO: 16.5 K/UL (ref 4.8–10.8)

## 2019-06-23 PROCEDURE — 700111 HCHG RX REV CODE 636 W/ 250 OVERRIDE (IP): Performed by: SURGERY

## 2019-06-23 PROCEDURE — 85018 HEMOGLOBIN: CPT

## 2019-06-23 PROCEDURE — 85025 COMPLETE CBC W/AUTO DIFF WBC: CPT

## 2019-06-23 PROCEDURE — 84100 ASSAY OF PHOSPHORUS: CPT

## 2019-06-23 PROCEDURE — 700105 HCHG RX REV CODE 258: Performed by: SURGERY

## 2019-06-23 PROCEDURE — 80053 COMPREHEN METABOLIC PANEL: CPT

## 2019-06-23 PROCEDURE — 770022 HCHG ROOM/CARE - ICU (200)

## 2019-06-23 PROCEDURE — 99291 CRITICAL CARE FIRST HOUR: CPT | Performed by: SURGERY

## 2019-06-23 PROCEDURE — 700101 HCHG RX REV CODE 250: Performed by: SURGERY

## 2019-06-23 PROCEDURE — 85014 HEMATOCRIT: CPT

## 2019-06-23 PROCEDURE — 71045 X-RAY EXAM CHEST 1 VIEW: CPT

## 2019-06-23 PROCEDURE — 83735 ASSAY OF MAGNESIUM: CPT

## 2019-06-23 RX ADMIN — Medication: at 13:57

## 2019-06-23 RX ADMIN — SODIUM CHLORIDE, POTASSIUM CHLORIDE, SODIUM LACTATE AND CALCIUM CHLORIDE: 600; 310; 30; 20 INJECTION, SOLUTION INTRAVENOUS at 07:22

## 2019-06-23 RX ADMIN — ENOXAPARIN SODIUM 30 MG: 100 INJECTION SUBCUTANEOUS at 17:24

## 2019-06-23 RX ADMIN — METOCLOPRAMIDE 10 MG: 5 INJECTION, SOLUTION INTRAMUSCULAR; INTRAVENOUS at 23:45

## 2019-06-23 RX ADMIN — METOCLOPRAMIDE 10 MG: 5 INJECTION, SOLUTION INTRAMUSCULAR; INTRAVENOUS at 05:50

## 2019-06-23 RX ADMIN — PIPERACILLIN AND TAZOBACTAM 4.5 G: 4; .5 INJECTION, POWDER, LYOPHILIZED, FOR SOLUTION INTRAVENOUS; PARENTERAL at 05:50

## 2019-06-23 RX ADMIN — ENOXAPARIN SODIUM 30 MG: 100 INJECTION SUBCUTANEOUS at 05:50

## 2019-06-23 RX ADMIN — POTASSIUM CHLORIDE: 2 INJECTION, SOLUTION, CONCENTRATE INTRAVENOUS at 19:38

## 2019-06-23 RX ADMIN — METOCLOPRAMIDE 10 MG: 5 INJECTION, SOLUTION INTRAMUSCULAR; INTRAVENOUS at 00:08

## 2019-06-23 RX ADMIN — METOCLOPRAMIDE 10 MG: 5 INJECTION, SOLUTION INTRAMUSCULAR; INTRAVENOUS at 14:00

## 2019-06-23 RX ADMIN — PIPERACILLIN AND TAZOBACTAM 4.5 G: 4; .5 INJECTION, POWDER, LYOPHILIZED, FOR SOLUTION INTRAVENOUS; PARENTERAL at 13:59

## 2019-06-23 RX ADMIN — METOCLOPRAMIDE 10 MG: 5 INJECTION, SOLUTION INTRAMUSCULAR; INTRAVENOUS at 17:24

## 2019-06-23 RX ADMIN — PIPERACILLIN AND TAZOBACTAM 4.5 G: 4; .5 INJECTION, POWDER, LYOPHILIZED, FOR SOLUTION INTRAVENOUS; PARENTERAL at 20:54

## 2019-06-23 NOTE — PROGRESS NOTES
2 RN skin check complete with FLAQUITO Mota.  Devices in place SCD's, nasal cannula, EKG leads, BP cuff, prevena, JAMES drain, pulse ox, PIV, central line     Skin assessed under the following devices SCD's, nasal cannula, EKG leads, BP cuff, prevena, JAMES drain, pulse ox.   Preventative measures in place including Q2 turns, mobility, waffle cushion, mepilex, skin assessment.  Following areas of concern:   ·Patients overall appearance jaundice  -cheeks flushed and red at times, when patient becomes flushed ears also red.  -mepilex to coccyx, skin intact underneath  - Abdominal incision with prevena drain, james site with gauze dressing and blisters around tape site and tegaderm and LLQ staples      The following interventions in place Q2 turns, mobility, waffle cushion, mepilex, skin assessments.      Wound consult placedYES/NO: no    Wound reported YES/NO: no  Appropriate LDAs opened YES/NO: no

## 2019-06-23 NOTE — CARE PLAN
Problem: Pain Management  Goal: Pain level will decrease to patient's comfort goal  Outcome: PROGRESSING AS EXPECTED  Patient on fentanyl pca.  Patient able to mobilize easier than previously.    Problem: Bowel/Gastric:  Goal: Normal bowel function is maintained or improved  Outcome: PROGRESSING SLOWER THAN EXPECTED  Suppository given after not having had bowel movement for approx 7 days.  Patient has had 2 bowel movements thus far.       Displaced R DR Walden. Indicated for operative fixation. All RBAs discussed. All questions answered. Complaining of mild parasthesias in R thumb which is nonprogressive. No parasthesias in other digits. 5/5 ain/pin/un. Fingers warm and well perfused

## 2019-06-23 NOTE — CARE PLAN
Problem: Safety  Goal: Will remain free from injury    Intervention: Provide assistance with mobility  Safety precautions in place      Problem: Skin Integrity  Goal: Risk for impaired skin integrity will decrease    Intervention: Assess risk factors for impaired skin integrity and/or pressure ulcers  Q2 turns, skin assessment complete

## 2019-06-23 NOTE — PROGRESS NOTES
2 RN skin check complete with FLAQUITO Campuzano.  Devices in place SCD, BP cuff, central line, PIV, NC.   Skin assessed under the following devices as listed above.   Preventative measures in place including mepilex on sacrum, q2 hour turns, arms floated with pillows.  Following areas of concern:   · Abdomen from surgical sites, redness on sacrum blanching   The following interventions in place as listed above    Wound consult placedYES/NO: N\A    Wound reported YES/NO: N\A  Appropriate LDAs opened YES/NO: N\A

## 2019-06-23 NOTE — PROGRESS NOTES
Trauma / Surgical Daily Progress Note    Date of Service  6/23/2019    Chief Complaint  59 y.o. male admitted 6/13/2019 with DIVERTICULITIS     Interval Events  POD 3  Abdomen remains distended  Intermittent confusion  Fentanyl drip,   Edematous  tmax 99.  Marginal IS at 750  TPN.  Small bm.    Zosyn:   JOHAN no output.    Voiding    Review of Systems  Review of Systems     Vital Signs for last 24 hours  Temp:  [36.8 °C (98.3 °F)-37.5 °C (99.5 °F)] 37.3 °C (99.1 °F)  Pulse:  [76-97] 81  Resp:  [12-53] 20  SpO2:  [89 %-100 %] 94 %    Hemodynamic parameters for last 24 hours       Respiratory Data     Respiration: 20, Pulse Oximetry: 94 %, O2 Daily Delivery Respiratory : Silicone Nasal Cannula     Work Of Breathing / Effort: Mild;Moderate  RUL Breath Sounds: Clear;Diminished, RML Breath Sounds: Diminished, RLL Breath Sounds: Diminished, BARBARA Breath Sounds: Clear;Diminished, LLL Breath Sounds: Diminished    Physical Exam  Physical Exam   Constitutional: He appears listless.   HENT:   Head: Normocephalic.   Eyes: Pupils are equal, round, and reactive to light.   Neck: No JVD present.   Pulmonary/Chest: No respiratory distress. He has no wheezes.   Abdominal: Soft. He exhibits distension.   Musculoskeletal: He exhibits no edema.   Neurological: He appears listless.   Skin: He is not diaphoretic.       Laboratory  Recent Results (from the past 24 hour(s))   HEMOGLOBIN AND HEMATOCRIT    Collection Time: 06/22/19  5:30 PM   Result Value Ref Range    Hemoglobin 7.3 (L) 14.0 - 18.0 g/dL    Hematocrit 23.2 (L) 42.0 - 52.0 %   ACCU-CHEK GLUCOSE    Collection Time: 06/22/19  5:32 PM   Result Value Ref Range    Glucose - Accu-Ck 104 (H) 65 - 99 mg/dL   HEMOGLOBIN AND HEMATOCRIT    Collection Time: 06/23/19 12:10 AM   Result Value Ref Range    Hemoglobin 7.4 (L) 14.0 - 18.0 g/dL    Hematocrit 22.4 (L) 42.0 - 52.0 %   CBC WITH DIFFERENTIAL    Collection Time: 06/23/19  4:15 AM   Result Value Ref Range    WBC 16.5 (H) 4.8 - 10.8 K/uL     RBC 2.54 (L) 4.70 - 6.10 M/uL    Hemoglobin 7.4 (L) 14.0 - 18.0 g/dL    Hematocrit 22.1 (L) 42.0 - 52.0 %    MCV 87.0 81.4 - 97.8 fL    MCH 29.1 27.0 - 33.0 pg    MCHC 33.5 (L) 33.7 - 35.3 g/dL    RDW 50.0 35.9 - 50.0 fL    Platelet Count 371 164 - 446 K/uL    MPV 10.1 9.0 - 12.9 fL    Neutrophils-Polys 84.00 (H) 44.00 - 72.00 %    Lymphocytes 7.70 (L) 22.00 - 41.00 %    Monocytes 5.70 0.00 - 13.40 %    Eosinophils 0.80 0.00 - 6.90 %    Basophils 0.20 0.00 - 1.80 %    Immature Granulocytes 1.60 (H) 0.00 - 0.90 %    Nucleated RBC 0.00 /100 WBC    Neutrophils (Absolute) 13.89 (H) 1.82 - 7.42 K/uL    Lymphs (Absolute) 1.28 1.00 - 4.80 K/uL    Monos (Absolute) 0.94 (H) 0.00 - 0.85 K/uL    Eos (Absolute) 0.14 0.00 - 0.51 K/uL    Baso (Absolute) 0.03 0.00 - 0.12 K/uL    Immature Granulocytes (abs) 0.26 (H) 0.00 - 0.11 K/uL    NRBC (Absolute) 0.00 K/uL   COMP METABOLIC PANEL    Collection Time: 06/23/19  4:15 AM   Result Value Ref Range    Sodium 141 135 - 145 mmol/L    Potassium 3.6 3.6 - 5.5 mmol/L    Chloride 108 96 - 112 mmol/L    Co2 26 20 - 33 mmol/L    Anion Gap 7.0 0.0 - 11.9    Glucose 135 (H) 65 - 99 mg/dL    Bun 15 8 - 22 mg/dL    Creatinine 0.76 0.50 - 1.40 mg/dL    Calcium 7.6 (L) 8.5 - 10.5 mg/dL    AST(SGOT) 19 12 - 45 U/L    ALT(SGPT) 13 2 - 50 U/L    Alkaline Phosphatase 68 30 - 99 U/L    Total Bilirubin 9.7 (H) 0.1 - 1.5 mg/dL    Albumin 2.2 (L) 3.2 - 4.9 g/dL    Total Protein 4.3 (L) 6.0 - 8.2 g/dL    Globulin 2.1 1.9 - 3.5 g/dL    A-G Ratio 1.0 g/dL   MAGNESIUM    Collection Time: 06/23/19  4:15 AM   Result Value Ref Range    Magnesium 2.2 1.5 - 2.5 mg/dL   PHOSPHORUS    Collection Time: 06/23/19  4:15 AM   Result Value Ref Range    Phosphorus 2.2 (L) 2.5 - 4.5 mg/dL   ESTIMATED GFR    Collection Time: 06/23/19  4:15 AM   Result Value Ref Range    GFR If African American >60 >60 mL/min/1.73 m 2    GFR If Non African American >60 >60 mL/min/1.73 m 2       Fluids    Intake/Output Summary (Last 24 hours)  at 06/23/19 1658  Last data filed at 06/23/19 1600   Gross per 24 hour   Intake          6043.36 ml   Output             2175 ml   Net          3868.36 ml       Core Measures & Quality Metrics  Labs reviewed, Medications reviewed and Radiology images reviewed  Flor catheter: Critically Ill - Requiring Accurate Measurement of Urinary Output      DVT Prophylaxis: Enoxaparin (Lovenox)  DVT prophylaxis - mechanical: SCDs          YADIEL Score  ETOH Screening    Assessment/Plan  Colostomy in place (HCC)- (present on admission)   Assessment & Plan    6/13/19  Resection and closure of colostomy  6/19 having BM's  6/20  Explored for bowel obstruction and infected hematoma.  Anastomosis revised.    6/22 some distention, post operative ileus         Discussed patient condition with RN, RT and Pharmacy.  CRITICAL CARE TIME EXCLUDING PROCEDURES: 36   Minutes.  Managing fentanyl drip, total parenteral nutrition, intraabdominal abscess, anticoagulation in the setting of blood loss anemia.  Decision making of high complexity.  I reviewed clinical labs, trends and orders for follow up.  Review of imaging,reports, consultant documentation .  Utilization of the information in todays decision making.   I evaluated the patient condition at bedside and discussed the daily plan(s) with available nursing staff, pharmacists on rounds.

## 2019-06-23 NOTE — PROGRESS NOTES
Pharmacy TPN Day # 3       2019    Dosing Weight   78 kg TPN currently providing 50% of goal      TPN goal: 0478-0737 kcal/day including 1.5 gm/kg/day Protein      TPN indication: Prolonged NPO with bowel resection     Pertinent PMH: Admitted 19 for ileostomy takedown. Patient subsequently developed an ileus and was without oral intake for 7 days. Patient taken back to the OR on 19 and found to have colonic obstruction secondary to mesenteric hematoma and herniated loop of small bowel. Anastomotic takedown was completed and obstruction resolved. Expected prolonged NPO with ileus following second procedure, thus patient initiated on parenteral nutrition pending return of bowel function.  Temp (24hrs), Av.2 °C (99 °F), Min:36.7 °C (98 °F), Max:37.5 °C (99.5 °F)  .  Recent Labs      19   0430  19   0530  19   0415   SODIUM  141  141  141   POTASSIUM  3.9  3.6  3.6   CHLORIDE  111  109  108   CO2  19*  25  26   BUN  21  18  15   CREATININE  1.16  0.85  0.76   GLUCOSE  141*  138*  135*   CALCIUM  7.1*  7.6*  7.6*   ASTSGOT  20  18  19   ALTSGPT  13  11  13   ALBUMIN  2.3*  2.5*  2.2*   TBILIRUBIN  8.6*  9.3*  9.7*   PHOSPHORUS  4.3  1.7*  2.2*   MAGNESIUM  2.0  2.4  2.2     Accu-Checks  Recent Labs      19   0447  19   1136  19   1732   POCGLUCOSE  127*  127*  104*       Vitals:    19 0400 19 0400 19 0515 19 0400   BP:   135/72    Weight: 91.8 kg (202 lb 6.1 oz) 94 kg (207 lb 3.7 oz)  93.1 kg (205 lb 4 oz)   Height:           Intake/Output Summary (Last 24 hours) at 19 1349  Last data filed at 19 1200   Gross per 24 hour   Intake          5547.11 ml   Output             2525 ml   Net          3022.11 ml       Orders Placed This Encounter   Procedures   • Diet NPO     Standing Status:   Standing     Number of Occurrences:   1     Order Specific Question:   Restrict to:     Answer:   Strict [1]         TPN for past 72 hours (Show up  to 3 orders; newest on the left. Changes between the two most recent orders are indicated.)     Start date and time   06/23/2019 2000 06/21/2019 2000      TPN Central Line Formulation [[id number]] TPN Central Line Formulation [[id number]]    Order Status  Active Last Dose in Progress    Last Given   06/22/2019 1959       Base    Clinisol 15%  120 g 60 g    dextrose 70%  340 g 135 g    fat emulsions 20%  55 g 30 g       Additives    potassium phosphates  20 mmol 10 mmol    potassium chloride  60 mEq 60 mEq    sodium acetate  150 mEq 150 mEq    sodium chloride  80 mEq 80 mEq    magnesium sulfate  12 mEq 12 mEq    calcium GLUConate  4.65 mEq 4.65 mEq    M.T.E. -5 Adult  1 mL 1 mL    M.V.I. ADULT  10 mL 10 mL    famotidine  40 mg 40 mg       Other    Total Protein  -- --    Total Protein/kg  -- --    Glucose Infusion Rate  -- --    Osmolarity  -- --    Volume  1,800 mL 1,800 mL    Rate  75 mL/hr 75 mL/hr    Dosing Weight  93.1 kg 91.8 kg    Infusion Site  [facility]        This formula provides:  % kcal as lipids = 25  Grams protein/kg = 1.5  Non-protein calories = 1706  Kcals/kg = 28  Total daily calories = 2186    A/P:  1. Macronutrients:  Advance TPN to 100% estimated daily goal nutrition.  2. Micronutrients/Electrolytes:   - Hypophosphatemia/Hypokalemia:  Increased in TPN as noted above   - No other changes to TPN formulation.  3. Glycemic Control:  FSBS < 180 mg/dL, CTM after advancement of TPN  4. Fluid Status:  Remains on LR + TPN with TIVF = 125 ml/hr.  UOP remains stable WNL.  Drain output decreased from yesterday.      Rosalia Mansfield, Pharm.D., BCPS

## 2019-06-24 ENCOUNTER — APPOINTMENT (OUTPATIENT)
Dept: RADIOLOGY | Facility: MEDICAL CENTER | Age: 59
DRG: 329 | End: 2019-06-24
Attending: SURGERY
Payer: MEDICAID

## 2019-06-24 PROBLEM — E46 MALNUTRITION (HCC): Status: ACTIVE | Noted: 2019-06-24

## 2019-06-24 LAB
ALBUMIN SERPL BCP-MCNC: 2.2 G/DL (ref 3.2–4.9)
ALBUMIN/GLOB SERPL: 1 G/DL
ALP SERPL-CCNC: 90 U/L (ref 30–99)
ALT SERPL-CCNC: 14 U/L (ref 2–50)
ANION GAP SERPL CALC-SCNC: 7 MMOL/L (ref 0–11.9)
AST SERPL-CCNC: 17 U/L (ref 12–45)
BACTERIA WND AEROBE CULT: ABNORMAL
BASOPHILS # BLD AUTO: 0.2 % (ref 0–1.8)
BASOPHILS # BLD: 0.03 K/UL (ref 0–0.12)
BILIRUB SERPL-MCNC: 7.3 MG/DL (ref 0.1–1.5)
BUN SERPL-MCNC: 14 MG/DL (ref 8–22)
CALCIUM SERPL-MCNC: 7.6 MG/DL (ref 8.5–10.5)
CHLORIDE SERPL-SCNC: 107 MMOL/L (ref 96–112)
CO2 SERPL-SCNC: 24 MMOL/L (ref 20–33)
CREAT SERPL-MCNC: 0.7 MG/DL (ref 0.5–1.4)
EOSINOPHIL # BLD AUTO: 0.13 K/UL (ref 0–0.51)
EOSINOPHIL NFR BLD: 0.9 % (ref 0–6.9)
ERYTHROCYTE [DISTWIDTH] IN BLOOD BY AUTOMATED COUNT: 50.8 FL (ref 35.9–50)
GLOBULIN SER CALC-MCNC: 2.1 G/DL (ref 1.9–3.5)
GLUCOSE BLD-MCNC: 130 MG/DL (ref 65–99)
GLUCOSE BLD-MCNC: 131 MG/DL (ref 65–99)
GLUCOSE SERPL-MCNC: 147 MG/DL (ref 65–99)
GRAM STN SPEC: ABNORMAL
HCT VFR BLD AUTO: 22.2 % (ref 42–52)
HCT VFR BLD AUTO: 23.7 % (ref 42–52)
HGB BLD-MCNC: 7.1 G/DL (ref 14–18)
HGB BLD-MCNC: 7.5 G/DL (ref 14–18)
IMM GRANULOCYTES # BLD AUTO: 0.54 K/UL (ref 0–0.11)
IMM GRANULOCYTES NFR BLD AUTO: 3.8 % (ref 0–0.9)
LYMPHOCYTES # BLD AUTO: 1.34 K/UL (ref 1–4.8)
LYMPHOCYTES NFR BLD: 9.3 % (ref 22–41)
MAGNESIUM SERPL-MCNC: 2.1 MG/DL (ref 1.5–2.5)
MCH RBC QN AUTO: 28.1 PG (ref 27–33)
MCHC RBC AUTO-ENTMCNC: 32 G/DL (ref 33.7–35.3)
MCV RBC AUTO: 87.7 FL (ref 81.4–97.8)
MONOCYTES # BLD AUTO: 1.03 K/UL (ref 0–0.85)
MONOCYTES NFR BLD AUTO: 7.2 % (ref 0–13.4)
NEUTROPHILS # BLD AUTO: 11.33 K/UL (ref 1.82–7.42)
NEUTROPHILS NFR BLD: 78.6 % (ref 44–72)
NRBC # BLD AUTO: 0 K/UL
NRBC BLD-RTO: 0 /100 WBC
PHOSPHATE SERPL-MCNC: 2.4 MG/DL (ref 2.5–4.5)
PLATELET # BLD AUTO: 387 K/UL (ref 164–446)
PMV BLD AUTO: 10.1 FL (ref 9–12.9)
POTASSIUM SERPL-SCNC: 3.9 MMOL/L (ref 3.6–5.5)
PREALB SERPL-MCNC: 8 MG/DL (ref 18–38)
PROT SERPL-MCNC: 4.3 G/DL (ref 6–8.2)
RBC # BLD AUTO: 2.53 M/UL (ref 4.7–6.1)
SIGNIFICANT IND 70042: ABNORMAL
SITE SITE: ABNORMAL
SODIUM SERPL-SCNC: 138 MMOL/L (ref 135–145)
SOURCE SOURCE: ABNORMAL
WBC # BLD AUTO: 14.4 K/UL (ref 4.8–10.8)

## 2019-06-24 PROCEDURE — 700111 HCHG RX REV CODE 636 W/ 250 OVERRIDE (IP): Performed by: SURGERY

## 2019-06-24 PROCEDURE — 83735 ASSAY OF MAGNESIUM: CPT

## 2019-06-24 PROCEDURE — 770022 HCHG ROOM/CARE - ICU (200)

## 2019-06-24 PROCEDURE — 85014 HEMATOCRIT: CPT

## 2019-06-24 PROCEDURE — 85018 HEMOGLOBIN: CPT

## 2019-06-24 PROCEDURE — 84100 ASSAY OF PHOSPHORUS: CPT

## 2019-06-24 PROCEDURE — 700105 HCHG RX REV CODE 258: Performed by: SURGERY

## 2019-06-24 PROCEDURE — 80053 COMPREHEN METABOLIC PANEL: CPT

## 2019-06-24 PROCEDURE — 700101 HCHG RX REV CODE 250: Performed by: SURGERY

## 2019-06-24 PROCEDURE — 84134 ASSAY OF PREALBUMIN: CPT

## 2019-06-24 PROCEDURE — 82962 GLUCOSE BLOOD TEST: CPT

## 2019-06-24 PROCEDURE — 99291 CRITICAL CARE FIRST HOUR: CPT | Performed by: SURGERY

## 2019-06-24 PROCEDURE — 85025 COMPLETE CBC W/AUTO DIFF WBC: CPT

## 2019-06-24 RX ORDER — LINEZOLID 2 MG/ML
600 INJECTION, SOLUTION INTRAVENOUS EVERY 12 HOURS
Status: DISCONTINUED | OUTPATIENT
Start: 2019-06-24 | End: 2019-07-02

## 2019-06-24 RX ADMIN — AMPICILLIN SODIUM AND SULBACTAM SODIUM 3 G: 2; 1 INJECTION, POWDER, FOR SOLUTION INTRAMUSCULAR; INTRAVENOUS at 18:18

## 2019-06-24 RX ADMIN — LINEZOLID 600 MG: 600 INJECTION, SOLUTION INTRAVENOUS at 19:44

## 2019-06-24 RX ADMIN — METOCLOPRAMIDE 10 MG: 5 INJECTION, SOLUTION INTRAMUSCULAR; INTRAVENOUS at 05:24

## 2019-06-24 RX ADMIN — ENOXAPARIN SODIUM 30 MG: 100 INJECTION SUBCUTANEOUS at 18:18

## 2019-06-24 RX ADMIN — AMPICILLIN SODIUM AND SULBACTAM SODIUM 3 G: 2; 1 INJECTION, POWDER, FOR SOLUTION INTRAMUSCULAR; INTRAVENOUS at 23:14

## 2019-06-24 RX ADMIN — PIPERACILLIN AND TAZOBACTAM 4.5 G: 4; .5 INJECTION, POWDER, LYOPHILIZED, FOR SOLUTION INTRAVENOUS; PARENTERAL at 04:38

## 2019-06-24 RX ADMIN — AMPICILLIN SODIUM AND SULBACTAM SODIUM 3 G: 2; 1 INJECTION, POWDER, FOR SOLUTION INTRAMUSCULAR; INTRAVENOUS at 12:46

## 2019-06-24 RX ADMIN — POTASSIUM CHLORIDE: 2 INJECTION, SOLUTION, CONCENTRATE INTRAVENOUS at 19:48

## 2019-06-24 RX ADMIN — METOCLOPRAMIDE 10 MG: 5 INJECTION, SOLUTION INTRAMUSCULAR; INTRAVENOUS at 12:46

## 2019-06-24 RX ADMIN — LINEZOLID 600 MG: 600 INJECTION, SOLUTION INTRAVENOUS at 10:02

## 2019-06-24 RX ADMIN — METOCLOPRAMIDE 10 MG: 5 INJECTION, SOLUTION INTRAMUSCULAR; INTRAVENOUS at 23:14

## 2019-06-24 RX ADMIN — ENOXAPARIN SODIUM 30 MG: 100 INJECTION SUBCUTANEOUS at 05:24

## 2019-06-24 RX ADMIN — METOCLOPRAMIDE 10 MG: 5 INJECTION, SOLUTION INTRAMUSCULAR; INTRAVENOUS at 18:17

## 2019-06-24 NOTE — PROGRESS NOTES
Dr. Napoles at bedside.  She had a thorough discussion with the family.  She educated them about the patient's abdominal infection, antibiotic therapy, plans to continue TPN, and addressed concerns about blisters around patient's JOHAN site.  Dr. Napoles assessed patient's abdomen.  They were assured that it is not a Staph infection and simply blisters from tape around the JOHAN dressing that formed due to edema.       Orders received to discontinue JOHAN drain.

## 2019-06-24 NOTE — PROGRESS NOTES
Trauma / Surgical Daily Progress Note    Date of Service  6/24/2019    Chief Complaint  59 y.o. male admitted 6/13/2019 with DIVERTICULITIS     Interval Events  Oriented.  Pain control adequate on fentanyl drip.    Ambulate to day  BS present.  No flatus.  To  Sips.    IS 1000  Tmax 100.8  WBC 14  Adequate void.    Enterococcus :   Linazolid.  /Unasyn:    Minimal drain output , removed.     Review of Systems  Review of Systems     Vital Signs for last 24 hours  Temp:  [36.3 °C (97.3 °F)-38.4 °C (101.2 °F)] 38.4 °C (101.2 °F)  Pulse:  [79-97] 85  Resp:  [11-31] 23  SpO2:  [93 %-98 %] 98 %    Hemodynamic parameters for last 24 hours       Respiratory Data     Respiration: (!) 23, Pulse Oximetry: 98 %, O2 Daily Delivery Respiratory : Room Air with O2 Available     Work Of Breathing / Effort: Mild  RUL Breath Sounds: Clear, RML Breath Sounds: Clear, RLL Breath Sounds: Diminished, BARBARA Breath Sounds: Clear, LLL Breath Sounds: Diminished    Physical Exam  Physical Exam   HENT:   Head: Normocephalic.   Eyes: Pupils are equal, round, and reactive to light.   Neck: No JVD present.   Pulmonary/Chest: No respiratory distress. He has no wheezes.   Abdominal: Soft. He exhibits distension.   Musculoskeletal: He exhibits no edema.   Neurological: He is alert.   Skin: He is not diaphoretic.       Laboratory  Recent Results (from the past 24 hour(s))   Magnesium: Every Monday and Thursday AM    Collection Time: 06/24/19  3:40 AM   Result Value Ref Range    Magnesium 2.1 1.5 - 2.5 mg/dL   Phosphorus: Every Monday and Thursday AM    Collection Time: 06/24/19  3:40 AM   Result Value Ref Range    Phosphorus 2.4 (L) 2.5 - 4.5 mg/dL   COMP METABOLIC PANEL    Collection Time: 06/24/19  3:40 AM   Result Value Ref Range    Sodium 138 135 - 145 mmol/L    Potassium 3.9 3.6 - 5.5 mmol/L    Chloride 107 96 - 112 mmol/L    Co2 24 20 - 33 mmol/L    Anion Gap 7.0 0.0 - 11.9    Glucose 147 (H) 65 - 99 mg/dL    Bun 14 8 - 22 mg/dL    Creatinine 0.70  0.50 - 1.40 mg/dL    Calcium 7.6 (L) 8.5 - 10.5 mg/dL    AST(SGOT) 17 12 - 45 U/L    ALT(SGPT) 14 2 - 50 U/L    Alkaline Phosphatase 90 30 - 99 U/L    Total Bilirubin 7.3 (H) 0.1 - 1.5 mg/dL    Albumin 2.2 (L) 3.2 - 4.9 g/dL    Total Protein 4.3 (L) 6.0 - 8.2 g/dL    Globulin 2.1 1.9 - 3.5 g/dL    A-G Ratio 1.0 g/dL   CBC WITH DIFFERENTIAL    Collection Time: 06/24/19  3:40 AM   Result Value Ref Range    WBC 14.4 (H) 4.8 - 10.8 K/uL    RBC 2.53 (L) 4.70 - 6.10 M/uL    Hemoglobin 7.1 (L) 14.0 - 18.0 g/dL    Hematocrit 22.2 (L) 42.0 - 52.0 %    MCV 87.7 81.4 - 97.8 fL    MCH 28.1 27.0 - 33.0 pg    MCHC 32.0 (L) 33.7 - 35.3 g/dL    RDW 50.8 (H) 35.9 - 50.0 fL    Platelet Count 387 164 - 446 K/uL    MPV 10.1 9.0 - 12.9 fL    Neutrophils-Polys 78.60 (H) 44.00 - 72.00 %    Lymphocytes 9.30 (L) 22.00 - 41.00 %    Monocytes 7.20 0.00 - 13.40 %    Eosinophils 0.90 0.00 - 6.90 %    Basophils 0.20 0.00 - 1.80 %    Immature Granulocytes 3.80 (H) 0.00 - 0.90 %    Nucleated RBC 0.00 /100 WBC    Neutrophils (Absolute) 11.33 (H) 1.82 - 7.42 K/uL    Lymphs (Absolute) 1.34 1.00 - 4.80 K/uL    Monos (Absolute) 1.03 (H) 0.00 - 0.85 K/uL    Eos (Absolute) 0.13 0.00 - 0.51 K/uL    Baso (Absolute) 0.03 0.00 - 0.12 K/uL    Immature Granulocytes (abs) 0.54 (H) 0.00 - 0.11 K/uL    NRBC (Absolute) 0.00 K/uL   PREALBUMIN    Collection Time: 06/24/19  3:40 AM   Result Value Ref Range    Pre-Albumin 8.0 (L) 18.0 - 38.0 mg/dL   ESTIMATED GFR    Collection Time: 06/24/19  3:40 AM   Result Value Ref Range    GFR If African American >60 >60 mL/min/1.73 m 2    GFR If Non African American >60 >60 mL/min/1.73 m 2   HEMOGLOBIN AND HEMATOCRIT    Collection Time: 06/24/19  3:20 PM   Result Value Ref Range    Hemoglobin 7.5 (L) 14.0 - 18.0 g/dL    Hematocrit 23.7 (L) 42.0 - 52.0 %   ACCU-CHEK GLUCOSE    Collection Time: 06/24/19  3:26 PM   Result Value Ref Range    Glucose - Accu-Ck 130 (H) 65 - 99 mg/dL       Fluids    Intake/Output Summary (Last 24  hours) at 06/24/19 2044  Last data filed at 06/24/19 1821   Gross per 24 hour   Intake          2709.05 ml   Output             4050 ml   Net         -1340.95 ml       Core Measures & Quality Metrics  Labs reviewed, Medications reviewed and Radiology images reviewed  Flor catheter: No Flor      DVT Prophylaxis: Enoxaparin (Lovenox)  DVT prophylaxis - mechanical: SCDs          YADIEL Score  ETOH Screening    Assessment/Plan  Mesenteric hematoma   Assessment & Plan    Hematoma evacuated.  Cultures are polymicrobial positive.    Zosyn intially.    6/24:  To Xyvox and Unasyn:  Enterococcus and E Coli       Colostomy in place (HCC)- (present on admission)   Assessment & Plan    6/13/19  Resection and closure of colostomy  6/19 having BM's  6/20  Explored for bowel obstruction and infected hematoma.  Anastomosis revised.    6/24  improved distention, post operative ileus resolving, flatus     Blood loss anemia   Assessment & Plan    Transfused one unit 6/20 6/22:  1 prbc  Trend.       Lung infiltrate   Assessment & Plan    6/19  Lung infiltrate post op.  Not on vent.  No sputum for culture.  IV antibiotics started .  Currently on antibiotics for intraabdominal infection         Discussed patient condition with RN, RT, Pharmacy and Dietary.  CRITICAL CARE TIME EXCLUDING PROCEDURES: 35  Minutes. Decision making of high complexity.  I reviewed clinical labs, trends and orders for follow up.  Review of imaging,reports, consultant documentation .  Utilization of the information in todays decision making.   I evaluated the patient condition at bedside and discussed the daily plan(s) with available nursing staff, dieticians, social workers, pharmacists on rounds. Managing total parenteral nutrition, intraabdominal infection, intravenous antibiotics.

## 2019-06-24 NOTE — PROGRESS NOTES
Pharmacy TPN Day # 4       2019    Dosing Weight   78 kg  TPN currently providing 100% of goal      TPN goal: 9840-6546 kcal/day including 1.5 gm/kg/day Protein      TPN indication: Prolonged NPO with bowel resection     Pertinent PMH: Admitted 19 for ileostomy takedown. Patient subsequently developed an ileus and was without oral intake for 7 days. Patient taken back to the OR on 19 and found to have colonic obstruction secondary to mesenteric hematoma and herniated loop of small bowel. Anastomotic takedown was completed and obstruction resolved. Expected prolonged NPO with ileus following second procedure, thus patient initiated on parenteral nutrition pending return of bowel function.    Temp (24hrs), Av.4 °C (99.3 °F), Min:36.3 °C (97.3 °F), Max:38.2 °C (100.8 °F)  .  Recent Labs      19   0530  19   0415  19   0340   SODIUM  141  141  138   POTASSIUM  3.6  3.6  3.9   CHLORIDE  109  108  107   CO2  25  26  24   BUN  18  15  14   CREATININE  0.85  0.76  0.70   GLUCOSE  138*  135*  147*   CALCIUM  7.6*  7.6*  7.6*   ASTSGOT  18  19  17   ALTSGPT  11  13  14   ALBUMIN  2.5*  2.2*  2.2*   TBILIRUBIN  9.3*  9.7*  7.3*   PHOSPHORUS  1.7*  2.2*  2.4*   MAGNESIUM  2.4  2.2  2.1   PREALBUMIN   --    --   8.0*     Accu-Checks  Recent Labs      19   0447  19   1136  19   1732   POCGLUCOSE  127*  127*  104*       Vitals:    19 0400 19 0515 19 0400 19 0400   BP:  135/72     Weight: 94 kg (207 lb 3.7 oz)  93.1 kg (205 lb 4 oz) 92.8 kg (204 lb 9.4 oz)   Height:           Intake/Output Summary (Last 24 hours) at 19 1315  Last data filed at 19 0800   Gross per 24 hour   Intake          2561.08 ml   Output             2852 ml   Net          -290.92 ml       Orders Placed This Encounter   Procedures   • Diet NPO     Standing Status:   Standing     Number of Occurrences:   1     Order Specific Question:   Restrict to:     Answer:   Strict [1]          TPN for past 72 hours (Show up to 3 orders; newest on the left. Changes between the two most recent orders are indicated.)     Start date and time   06/23/2019 2000 06/21/2019 2000      TPN Central Line Formulation [[id number]] TPN Central Line Formulation [[id number]]    Order Status  Active Completed    Last Given  06/23/2019 1938 06/22/2019 1959       Base    Clinisol 15%  120 g 60 g    dextrose 70%  340 g 135 g    fat emulsions 20%  55 g 30 g       Additives    potassium phosphates  20 mmol 10 mmol    potassium chloride  60 mEq 60 mEq    sodium acetate  150 mEq 150 mEq    sodium chloride  80 mEq 80 mEq    magnesium sulfate  12 mEq 12 mEq    calcium GLUConate  4.65 mEq 4.65 mEq    M.T.E. -5 Adult  1 mL 1 mL    M.V.I. ADULT  10 mL 10 mL    famotidine  40 mg 40 mg       Other    Total Protein  -- --    Total Protein/kg  -- --    Glucose Infusion Rate  -- --    Osmolarity  -- --    Volume  1,800 mL 1,800 mL    Rate  75 mL/hr 75 mL/hr    Dosing Weight  93.1 kg 91.8 kg    Infusion Site  Central Central        This formula provides:  % kcal as lipids = 25  Grams protein/kg = 1.5  Non-protein calories = 1706  Kcals/kg = 28  Total daily calories = 2186    Comments:  No changes to current formulation.  TIVF remains at 125 ml/hr.  Brisk UOP.  Adequate glycemic control.  TPN remains at 100% estimated daily goal nutrition.      Rosalia Mansfield, Pharm.D., BCPS

## 2019-06-24 NOTE — CARE PLAN
Problem: Bowel/Gastric:  Goal: Normal bowel function is maintained or improved  Abdominal assessments performed every four hours. Encouraged to ambulate to increase gastric motility, reglan therapy in use.     Problem: Mobility  Goal: Risk for activity intolerance will decrease  Patient ambulated with front-wheel walker and one person assist.  Patient and family educated regarding importance of ambulation and mobility to improve gastric function.  Patient up to chair for one hour.  Encouraged patient to walk 2-3 times per day.  Patient resistant to mobility.

## 2019-06-24 NOTE — CARE PLAN
Problem: Pain Management  Goal: Pain level will decrease to patient's comfort goal  Pt medicated per MAR. Non-pharmacological interventions per flowsheets.    Problem: Respiratory:  Goal: Respiratory status will improve  Pt encouraged to use IS while awake and educated on coughing and deep breathing.

## 2019-06-24 NOTE — PROGRESS NOTES
"Pt's daughter, Pilar, arrived to unit at 0400 and requested the pt be given juice and his Fentanyl drip be turned off and he be given oxycodone instead. This RN attempted to educate the daughter on why he won't tolerate a liquid diet at this time, as well as our pain rating scale and the use of different pain medication. The daughter then became verbally aggressively with this RN.  This RN got the charge RN, Theresa, to bedside. At this time the daughter was on the phone, this RN and the charge RN learned the daughter was on the phone with Dr. Ponce's office. Theresa HUDDLESTON attempted to deescalate the daughter with no improvement and placed call to JAROCHO.  Dr. Ponce called this RN after receiving a page from his office. This RN explained the situation, Dr. Ponce requested to speak to the charge RN.  Son arrived at bedside with a much calmer demeanor and attempted to calm the daughter down unsuccessfully. The daughter then left the room. This RN spoke to the son and pt and answered all questions. Son and pt were agreeable.  Dr. Ponce called this RN again after the daughter paged Dr. Ponce's office again. Dr. Ponce requested to speak to the daughter over the phone with a witness present. Daughter came back to bedside and spoke to Dr. Ponce with Theresa HUDDLESTON.  This RN was in the pt's room hanging a medication when the daughter returned to the room with no change in mood. The daughter then began witting notes of \"everything that has happened with the date and time.\"  Pt's sister arrived at bedside. This RN answered all her questions. Pt's sister seemed agreeable when this RN left the room.  NAM at bedside to check in with this RN and charge RN. Daughter had left the unit by this time.  Sister remains at bedside, all questions answered as they arise. Pt is resting and has no needs at this time.  "

## 2019-06-24 NOTE — PROGRESS NOTES
Dr. Ponce at bedside for interdisciplinary rounds.  Discussed plan of care, antibiotic therapy, pain regimen, and ok to advance to sips and chips.    Inquired about central line and length of TPN therapy, and need for possible PICC line.  No PICC at this time, continue TPN through central line.

## 2019-06-24 NOTE — PROGRESS NOTES
2 RN skin check complete with FLAQUITO Berry.  Devices in place silicone nasal cannula, BP cuff, Prevena, pulse ox, and SCDs.   Skin assessed under all devices.   Preventative measures in place including q2h turns and mepilex on sacrum.  Following areas of concern:   · Abdominal incision with prevena in place, old colostomy site with staples, JOHAN drain in RLQ, and blisters and skin tears from tape removal    Wound consult placedYES/NO: yes, already following pt  Wound reported YES/NO: no  Appropriate LDAs opened YES/NO: yes

## 2019-06-24 NOTE — CARE PLAN
Problem: Nutritional:  Goal: Nutrition support tolerated and meeting greater than 85% of estimated needs  Outcome: MET Date Met: 06/24/19  TPN is providing 100% of goal rate per Pharmacy note from today (6/24).  Kcal and protein reviewed.

## 2019-06-24 NOTE — CARE PLAN
Problem: Hyperinflation:  Goal: Prevent or improve atelectasis    Intervention: Instruct incentive spirometry usage  60% of pred is 1860. Best IS value this shift was 1100.

## 2019-06-25 ENCOUNTER — APPOINTMENT (OUTPATIENT)
Dept: RADIOLOGY | Facility: MEDICAL CENTER | Age: 59
DRG: 329 | End: 2019-06-25
Attending: SURGERY
Payer: MEDICAID

## 2019-06-25 LAB
BASOPHILS # BLD AUTO: 3.5 % (ref 0–1.8)
BASOPHILS # BLD: 0.55 K/UL (ref 0–0.12)
EOSINOPHIL # BLD AUTO: 0.27 K/UL (ref 0–0.51)
EOSINOPHIL NFR BLD: 1.7 % (ref 0–6.9)
ERYTHROCYTE [DISTWIDTH] IN BLOOD BY AUTOMATED COUNT: 51.2 FL (ref 35.9–50)
GIANT PLATELETS BLD QL SMEAR: NORMAL
GLUCOSE BLD-MCNC: 142 MG/DL (ref 65–99)
HCT VFR BLD AUTO: 23.2 % (ref 42–52)
HCT VFR BLD AUTO: 23.7 % (ref 42–52)
HGB BLD-MCNC: 7.3 G/DL (ref 14–18)
HGB BLD-MCNC: 7.9 G/DL (ref 14–18)
HYPOCHROMIA BLD QL SMEAR: ABNORMAL
LYMPHOCYTES # BLD AUTO: 0.69 K/UL (ref 1–4.8)
LYMPHOCYTES NFR BLD: 4.4 % (ref 22–41)
MANUAL DIFF BLD: NORMAL
MCH RBC QN AUTO: 27.4 PG (ref 27–33)
MCHC RBC AUTO-ENTMCNC: 31.5 G/DL (ref 33.7–35.3)
MCV RBC AUTO: 87.2 FL (ref 81.4–97.8)
MONOCYTES # BLD AUTO: 0.67 K/UL (ref 0–0.85)
MONOCYTES NFR BLD AUTO: 4.3 % (ref 0–13.4)
MORPHOLOGY BLD-IMP: NORMAL
MYELOCYTES NFR BLD MANUAL: 0.9 %
NEUTROPHILS # BLD AUTO: 13.29 K/UL (ref 1.82–7.42)
NEUTROPHILS NFR BLD: 85.2 % (ref 44–72)
NRBC # BLD AUTO: 0 K/UL
NRBC BLD-RTO: 0 /100 WBC
PLATELET # BLD AUTO: 443 K/UL (ref 164–446)
PLATELET BLD QL SMEAR: NORMAL
PMV BLD AUTO: 10.1 FL (ref 9–12.9)
POIKILOCYTOSIS BLD QL SMEAR: NORMAL
POLYCHROMASIA BLD QL SMEAR: NORMAL
RBC # BLD AUTO: 2.66 M/UL (ref 4.7–6.1)
RBC BLD AUTO: PRESENT
SMUDGE CELLS BLD QL SMEAR: NORMAL
TARGETS BLD QL SMEAR: NORMAL
WBC # BLD AUTO: 15.6 K/UL (ref 4.8–10.8)

## 2019-06-25 PROCEDURE — 85014 HEMATOCRIT: CPT

## 2019-06-25 PROCEDURE — 85018 HEMOGLOBIN: CPT

## 2019-06-25 PROCEDURE — 700105 HCHG RX REV CODE 258: Performed by: SURGERY

## 2019-06-25 PROCEDURE — 99233 SBSQ HOSP IP/OBS HIGH 50: CPT | Performed by: SURGERY

## 2019-06-25 PROCEDURE — 85027 COMPLETE CBC AUTOMATED: CPT

## 2019-06-25 PROCEDURE — 82962 GLUCOSE BLOOD TEST: CPT

## 2019-06-25 PROCEDURE — 700111 HCHG RX REV CODE 636 W/ 250 OVERRIDE (IP): Performed by: SURGERY

## 2019-06-25 PROCEDURE — 770022 HCHG ROOM/CARE - ICU (200)

## 2019-06-25 PROCEDURE — 700101 HCHG RX REV CODE 250: Performed by: SURGERY

## 2019-06-25 PROCEDURE — 85007 BL SMEAR W/DIFF WBC COUNT: CPT

## 2019-06-25 RX ADMIN — LINEZOLID 600 MG: 600 INJECTION, SOLUTION INTRAVENOUS at 06:00

## 2019-06-25 RX ADMIN — AMPICILLIN SODIUM AND SULBACTAM SODIUM 3 G: 2; 1 INJECTION, POWDER, FOR SOLUTION INTRAMUSCULAR; INTRAVENOUS at 17:20

## 2019-06-25 RX ADMIN — SODIUM CHLORIDE, POTASSIUM CHLORIDE, SODIUM LACTATE AND CALCIUM CHLORIDE: 600; 310; 30; 20 INJECTION, SOLUTION INTRAVENOUS at 00:48

## 2019-06-25 RX ADMIN — AMPICILLIN SODIUM AND SULBACTAM SODIUM 3 G: 2; 1 INJECTION, POWDER, FOR SOLUTION INTRAMUSCULAR; INTRAVENOUS at 05:32

## 2019-06-25 RX ADMIN — METOCLOPRAMIDE 10 MG: 5 INJECTION, SOLUTION INTRAMUSCULAR; INTRAVENOUS at 12:00

## 2019-06-25 RX ADMIN — LINEZOLID 600 MG: 600 INJECTION, SOLUTION INTRAVENOUS at 18:00

## 2019-06-25 RX ADMIN — Medication: at 12:59

## 2019-06-25 RX ADMIN — POTASSIUM CHLORIDE: 2 INJECTION, SOLUTION, CONCENTRATE INTRAVENOUS at 20:07

## 2019-06-25 RX ADMIN — AMPICILLIN SODIUM AND SULBACTAM SODIUM 3 G: 2; 1 INJECTION, POWDER, FOR SOLUTION INTRAMUSCULAR; INTRAVENOUS at 12:01

## 2019-06-25 RX ADMIN — ENOXAPARIN SODIUM 30 MG: 100 INJECTION SUBCUTANEOUS at 17:13

## 2019-06-25 RX ADMIN — ENOXAPARIN SODIUM 30 MG: 100 INJECTION SUBCUTANEOUS at 05:31

## 2019-06-25 RX ADMIN — METOCLOPRAMIDE 10 MG: 5 INJECTION, SOLUTION INTRAMUSCULAR; INTRAVENOUS at 17:13

## 2019-06-25 RX ADMIN — METOCLOPRAMIDE 10 MG: 5 INJECTION, SOLUTION INTRAMUSCULAR; INTRAVENOUS at 05:31

## 2019-06-25 ASSESSMENT — ENCOUNTER SYMPTOMS
ABDOMINAL PAIN: 0
ABDOMINAL DISTENTION: 1

## 2019-06-25 NOTE — CARE PLAN
Problem: Hyperinflation:  Goal: Prevent or improve atelectasis    Intervention: Instruct incentive spirometry usage  IS 1000

## 2019-06-25 NOTE — ASSESSMENT & PLAN NOTE
6/27 CT imaging demonstrated a large rim-enhancing fluid collection in the anterior abdomen which measures approximately 22.8 x 3.9 x 16 cm consistent with abscess.  6/28 CT guided percutaneous drain placement.  7/1 Xyvox and Unasyn day 8, antibiotic day 13.  7/2 Change to PO Zyvox and Augmentin

## 2019-06-25 NOTE — PROGRESS NOTES
Trauma / Surgical Daily Progress Note    Date of Service  6/25/2019    Chief Complaint  59 y.o. male admitted 6/13/2019 for colostomy takedown. Required revision of anastomosis and evacuation of infected hematoma.    Interval Events  Hospital day #13  Pt currently requires ICU care  Seen on rounds and discussed with multidisciplinary team  Physiologic derangements preclude floor transfer  Events and interventions include:  Nutritional support-remains on TPN, clears initiated  Aggressive pulmonary toilet  Ongoing infusion of abx for infected hematoma  Pain control    Review of Systems  Review of Systems   Gastrointestinal: Positive for abdominal distention. Negative for abdominal pain.   All other systems reviewed and are negative.       Vital Signs for last 24 hours  Temp:  [37.2 °C (98.9 °F)-38.6 °C (101.5 °F)] 37.2 °C (98.9 °F)  Pulse:  [75-88] 78  Resp:  [19-29] 24  SpO2:  [94 %-99 %] 98 %    Hemodynamic parameters for last 24 hours       Respiratory Data     Respiration: (!) 24, Pulse Oximetry: 98 %, O2 Daily Delivery Respiratory : Room Air with O2 Available     Work Of Breathing / Effort: Mild;Shallow  RUL Breath Sounds: Clear, RML Breath Sounds: Clear, RLL Breath Sounds: Diminished, BARBARA Breath Sounds: Clear, LLL Breath Sounds: Diminished    Physical Exam  Physical Exam   Constitutional: He is oriented to person, place, and time. He appears well-developed and well-nourished. No distress.   HENT:   Head: Normocephalic and atraumatic.   Eyes: Pupils are equal, round, and reactive to light. EOM are normal. No scleral icterus.   Neck: Normal range of motion. Neck supple. No tracheal deviation present.   Cardiovascular: Normal rate, regular rhythm and normal heart sounds.    Pulmonary/Chest: Effort normal and breath sounds normal. No respiratory distress. He has no wheezes.   Abdominal: Soft. He exhibits distension. There is no tenderness.   Musculoskeletal: Normal range of motion. He exhibits edema.   Neurological:  He is alert and oriented to person, place, and time. No cranial nerve deficit.   Skin: Skin is warm and dry. No erythema.   Psychiatric: He has a normal mood and affect. His behavior is normal.       Laboratory  Recent Results (from the past 24 hour(s))   HEMOGLOBIN AND HEMATOCRIT    Collection Time: 06/24/19  3:20 PM   Result Value Ref Range    Hemoglobin 7.5 (L) 14.0 - 18.0 g/dL    Hematocrit 23.7 (L) 42.0 - 52.0 %   ACCU-CHEK GLUCOSE    Collection Time: 06/24/19  3:26 PM   Result Value Ref Range    Glucose - Accu-Ck 130 (H) 65 - 99 mg/dL   ACCU-CHEK GLUCOSE    Collection Time: 06/24/19 11:17 PM   Result Value Ref Range    Glucose - Accu-Ck 131 (H) 65 - 99 mg/dL   CBC WITH DIFFERENTIAL    Collection Time: 06/25/19  4:00 AM   Result Value Ref Range    WBC 15.6 (H) 4.8 - 10.8 K/uL    RBC 2.66 (L) 4.70 - 6.10 M/uL    Hemoglobin 7.3 (L) 14.0 - 18.0 g/dL    Hematocrit 23.2 (L) 42.0 - 52.0 %    MCV 87.2 81.4 - 97.8 fL    MCH 27.4 27.0 - 33.0 pg    MCHC 31.5 (L) 33.7 - 35.3 g/dL    RDW 51.2 (H) 35.9 - 50.0 fL    Platelet Count 443 164 - 446 K/uL    MPV 10.1 9.0 - 12.9 fL    Neutrophils-Polys 85.20 (H) 44.00 - 72.00 %    Lymphocytes 4.40 (L) 22.00 - 41.00 %    Monocytes 4.30 0.00 - 13.40 %    Eosinophils 1.70 0.00 - 6.90 %    Basophils 3.50 (H) 0.00 - 1.80 %    Nucleated RBC 0.00 /100 WBC    Neutrophils (Absolute) 13.29 (H) 1.82 - 7.42 K/uL    Lymphs (Absolute) 0.69 (L) 1.00 - 4.80 K/uL    Monos (Absolute) 0.67 0.00 - 0.85 K/uL    Eos (Absolute) 0.27 0.00 - 0.51 K/uL    Baso (Absolute) 0.55 (H) 0.00 - 0.12 K/uL    NRBC (Absolute) 0.00 K/uL    Hypochromia 1+    DIFFERENTIAL MANUAL    Collection Time: 06/25/19  4:00 AM   Result Value Ref Range    Myelocytes 0.90 %    Manual Diff Status PERFORMED    PERIPHERAL SMEAR REVIEW    Collection Time: 06/25/19  4:00 AM   Result Value Ref Range    Peripheral Smear Review see below    PLATELET ESTIMATE    Collection Time: 06/25/19  4:00 AM   Result Value Ref Range    Plt Estimation  Increased    MORPHOLOGY    Collection Time: 06/25/19  4:00 AM   Result Value Ref Range    RBC Morphology Present     Giant Platelets 1+     Polychromia 1+     Poikilocytosis 1+     Target Cells 1+     Smudge Cells Few    ACCU-CHEK GLUCOSE    Collection Time: 06/25/19  9:13 AM   Result Value Ref Range    Glucose - Accu-Ck 142 (H) 65 - 99 mg/dL       Fluids    Intake/Output Summary (Last 24 hours) at 06/25/19 1314  Last data filed at 06/25/19 1000   Gross per 24 hour   Intake           3492.6 ml   Output             3450 ml   Net             42.6 ml       Core Measures & Quality Metrics  Labs reviewed and Radiology images reviewed  Flor catheter: No Flor  Central line in place: TPN    DVT Prophylaxis: Enoxaparin (Lovenox)  DVT prophylaxis - mechanical: SCDs  Ulcer prophylaxis: Yes  Antibiotics: Treating active infection/contamination beyond 24 hours perioperative coverage      YADIEL Score  ETOH Screening    Assessment/Plan  Mesenteric hematoma   Assessment & Plan    Hematoma evacuated.  Cultures are polymicrobial positive.    Zosyn intially.    6/25:   Xyvox and Unasyn:  abx day #2       Colostomy in place ([facility])- (present on admission)   Assessment & Plan    6/13/19  Resection and closure of colostomy  6/19 having BM's  6/20  Explored for bowel obstruction and infected hematoma.  Anastomosis revised.    6/24  improved distention, post operative ileus resolving, flatus  6/25 clears initiated     Blood loss anemia   Assessment & Plan    Transfused one unit 6/20 6/22:  1 prbc  Trend.       Lung infiltrate   Assessment & Plan    6/19  Lung infiltrate post op.  Not on vent.  No sputum for culture.  Currently on antibiotics for intraabdominal infection     Respiratory failure following trauma and surgery ([facility])   Assessment & Plan    Extubated 6/21  tolerating         Discussed patient condition with RN, RT, Pharmacy, Dietary,  and Patient.

## 2019-06-25 NOTE — ASSESSMENT & PLAN NOTE
Persistent critical anemia.  6/20 Transfused 2 uPRBCs.  6/28 Transfused 1 uPRBC.  7/2 Iron studies and replacement per pharmacy.  Transfuse 1 unit PRBC's for hemoglobin less than 7.

## 2019-06-25 NOTE — CARE PLAN
Problem: Pain Management  Goal: Pain level will decrease to patient's comfort goal  Outcome: PROGRESSING AS EXPECTED  Pain level improving with fentanyl PCA    Problem: Bowel/Gastric:  Goal: Normal bowel function is maintained or improved  Outcome: PROGRESSING AS EXPECTED    Intervention: Educate patient and significant other/support system about diet, fluid intake, medications and activity to promote bowel function  Encourage mobility, PRN suppository

## 2019-06-25 NOTE — PROGRESS NOTES
Pharmacy TPN Day # 5       2019    Dosing Weight   78 kg             TPN currently providing 100% of goal                                                  TPN goal: 0752-2918 kcal/day including 1.5 gm/kg/day Protein                                                  TPN indication: Prolonged NPO with bowel resection     Pertinent PMH: Admitted 19 for ileostomy takedown. Patient subsequently developed an ileus and was without oral intake for 7 days. Patient taken back to the OR on 19 and found to have colonic obstruction secondary to mesenteric hematoma and herniated loop of small bowel. Anastomotic takedown was completed and obstruction resolved. Expected prolonged NPO with ileus following second procedure, thus patient initiated on parenteral nutrition pending return of bowel function.    Temp (24hrs), Av.9 °C (100.3 °F), Min:37.2 °C (98.9 °F), Max:38.6 °C (101.5 °F)  .  Recent Labs      19   0415  19   0340   SODIUM  141  138   POTASSIUM  3.6  3.9   CHLORIDE  108  107   CO2  26  24   BUN  15  14   CREATININE  0.76  0.70   GLUCOSE  135*  147*   CALCIUM  7.6*  7.6*   ASTSGOT  19  17   ALTSGPT  13  14   ALBUMIN  2.2*  2.2*   TBILIRUBIN  9.7*  7.3*   PHOSPHORUS  2.2*  2.4*   MAGNESIUM  2.2  2.1   PREALBUMIN   --   8.0*     Accu-Checks  Recent Labs      19   1526  19   2317  19   0913   POCGLUCOSE  130*  131*  142*       Vitals:    19 0515 19 0400 19 0400 19 0400   BP: 135/72      Weight:  93.1 kg (205 lb 4 oz) 92.8 kg (204 lb 9.4 oz) 92 kg (202 lb 13.2 oz)   Height:           Intake/Output Summary (Last 24 hours) at 19 1246  Last data filed at 19 1000   Gross per 24 hour   Intake           3492.6 ml   Output             3450 ml   Net             42.6 ml       Orders Placed This Encounter   Procedures   • Diet NPO     Standing Status:   Standing     Number of Occurrences:   1     Order Specific Question:   Restrict to:     Answer:   Ice  Chips [2]     Comments:   sips and chips ok          TPN for past 72 hours (Show up to 3 orders; newest on the left. Changes between the two most recent orders are indicated.)     Start date and time   06/23/2019 2000 06/21/2019 2000      TPN Central Line Formulation [[id number]] TPN Central Line Formulation [[id number]]    Order Status  Active Completed    Last Given  06/24/2019 1948 06/22/2019 1959       Base    Clinisol 15%  120 g 60 g    dextrose 70%  340 g 135 g    fat emulsions 20%  55 g 30 g       Additives    potassium phosphates  20 mmol 10 mmol    potassium chloride  60 mEq 60 mEq    sodium acetate  150 mEq 150 mEq    sodium chloride  80 mEq 80 mEq    magnesium sulfate  12 mEq 12 mEq    calcium GLUConate  4.65 mEq 4.65 mEq    M.T.E. -5 Adult  1 mL 1 mL    M.V.I. ADULT  10 mL 10 mL    famotidine  40 mg 40 mg       Energy Contribution    Proteins  -- --    Dextrose  -- --    Lipids  -- --    Total  0 kcal 0 kcal       Electrolyte Ion Calculated Amount    Sodium  -- --    Potassium  -- --    Calcium  -- --    Magnesium  -- --    Aluminum  -- --    Phosphate  -- --    Chloride  -- --    Acetate  -- --       Other    Total Protein  -- --    Total Protein/kg  -- --    Glucose Infusion Rate  -- --    Osmolarity  -- --    Volume  1,800 mL 1,800 mL    Rate  75 mL/hr 75 mL/hr    Dosing Weight  93.1 kg 91.8 kg    Infusion Site  [facility]            This formula provides:  % kcal as lipids = 25  Grams protein/kg = 1.5  Non-protein calories = 1706  Kcals/kg = 28  Total daily calories = 2186    Comments:  No labs for assessment today. Continue current formulation.   Net negative fluid balance in last 24 hours. Still positive 10 kg from admission.  Large bowel movement, bowel sounds present.    Diet advanced to clears. If tolerates, anticipate advancing further tomorrow and discontinuing TPN.   No hyperglycemia noted. Goal blood sugar less than 180 mg/dL.       Senthil Stephens, PharmD

## 2019-06-25 NOTE — ASSESSMENT & PLAN NOTE
6/19  Lung infiltrate post op.  Not on vent.  No sputum for culture.  Currently on antibiotics for intraabdominal infection

## 2019-06-25 NOTE — CARE PLAN
Problem: Safety  Goal: Will remain free from injury  Safety precautions in place    Problem: Mobility  Goal: Risk for activity intolerance will decrease    Intervention: Assess and monitor signs of activity intolerance  Mobilized to bathroom, tolerated well

## 2019-06-25 NOTE — ASSESSMENT & PLAN NOTE
Mechanical ventilatory support following initial surgeries.  6/21 Extubated.  Continue aggressive pulmonary care and hygiene.

## 2019-06-26 ENCOUNTER — APPOINTMENT (OUTPATIENT)
Dept: RADIOLOGY | Facility: MEDICAL CENTER | Age: 59
DRG: 329 | End: 2019-06-26
Attending: SURGERY
Payer: MEDICAID

## 2019-06-26 LAB
ALBUMIN SERPL BCP-MCNC: 2.4 G/DL (ref 3.2–4.9)
ALBUMIN/GLOB SERPL: 1 G/DL
ALP SERPL-CCNC: 209 U/L (ref 30–99)
ALT SERPL-CCNC: 18 U/L (ref 2–50)
ANION GAP SERPL CALC-SCNC: 7 MMOL/L (ref 0–11.9)
ANISOCYTOSIS BLD QL SMEAR: ABNORMAL
AST SERPL-CCNC: 18 U/L (ref 12–45)
BASOPHILS # BLD AUTO: 0 % (ref 0–1.8)
BASOPHILS # BLD: 0 K/UL (ref 0–0.12)
BILIRUB SERPL-MCNC: 5.1 MG/DL (ref 0.1–1.5)
BUN SERPL-MCNC: 13 MG/DL (ref 8–22)
CALCIUM SERPL-MCNC: 7.7 MG/DL (ref 8.5–10.5)
CHLORIDE SERPL-SCNC: 104 MMOL/L (ref 96–112)
CO2 SERPL-SCNC: 22 MMOL/L (ref 20–33)
CREAT SERPL-MCNC: 0.67 MG/DL (ref 0.5–1.4)
CRP SERPL HS-MCNC: 11.14 MG/DL (ref 0–0.75)
EOSINOPHIL # BLD AUTO: 0.16 K/UL (ref 0–0.51)
EOSINOPHIL NFR BLD: 0.9 % (ref 0–6.9)
ERYTHROCYTE [DISTWIDTH] IN BLOOD BY AUTOMATED COUNT: 50.1 FL (ref 35.9–50)
GLOBULIN SER CALC-MCNC: 2.5 G/DL (ref 1.9–3.5)
GLUCOSE BLD-MCNC: 117 MG/DL (ref 65–99)
GLUCOSE BLD-MCNC: 122 MG/DL (ref 65–99)
GLUCOSE BLD-MCNC: 128 MG/DL (ref 65–99)
GLUCOSE SERPL-MCNC: 148 MG/DL (ref 65–99)
HCT VFR BLD AUTO: 22.1 % (ref 42–52)
HCT VFR BLD AUTO: 23.5 % (ref 42–52)
HGB BLD-MCNC: 7.3 G/DL (ref 14–18)
HGB BLD-MCNC: 7.5 G/DL (ref 14–18)
LG PLATELETS BLD QL SMEAR: NORMAL
LYMPHOCYTES # BLD AUTO: 1.07 K/UL (ref 1–4.8)
LYMPHOCYTES NFR BLD: 6.1 % (ref 22–41)
MANUAL DIFF BLD: NORMAL
MCH RBC QN AUTO: 28.2 PG (ref 27–33)
MCHC RBC AUTO-ENTMCNC: 33 G/DL (ref 33.7–35.3)
MCV RBC AUTO: 85.3 FL (ref 81.4–97.8)
MICROCYTES BLD QL SMEAR: ABNORMAL
MONOCYTES # BLD AUTO: 1.39 K/UL (ref 0–0.85)
MONOCYTES NFR BLD AUTO: 7.9 % (ref 0–13.4)
MORPHOLOGY BLD-IMP: NORMAL
MYELOCYTES NFR BLD MANUAL: 1.8 %
NEUTROPHILS # BLD AUTO: 14.66 K/UL (ref 1.82–7.42)
NEUTROPHILS NFR BLD: 83.3 % (ref 44–72)
NRBC # BLD AUTO: 0 K/UL
NRBC BLD-RTO: 0 /100 WBC
PLATELET # BLD AUTO: 433 K/UL (ref 164–446)
PLATELET BLD QL SMEAR: NORMAL
PMV BLD AUTO: 10.3 FL (ref 9–12.9)
POIKILOCYTOSIS BLD QL SMEAR: NORMAL
POTASSIUM SERPL-SCNC: 3.7 MMOL/L (ref 3.6–5.5)
PREALB SERPL-MCNC: 11 MG/DL (ref 18–38)
PROT SERPL-MCNC: 4.9 G/DL (ref 6–8.2)
RBC # BLD AUTO: 2.59 M/UL (ref 4.7–6.1)
RBC BLD AUTO: PRESENT
SODIUM SERPL-SCNC: 133 MMOL/L (ref 135–145)
TARGETS BLD QL SMEAR: NORMAL
WBC # BLD AUTO: 17.6 K/UL (ref 4.8–10.8)

## 2019-06-26 PROCEDURE — 700105 HCHG RX REV CODE 258: Performed by: SURGERY

## 2019-06-26 PROCEDURE — 85018 HEMOGLOBIN: CPT

## 2019-06-26 PROCEDURE — 85014 HEMATOCRIT: CPT

## 2019-06-26 PROCEDURE — 700102 HCHG RX REV CODE 250 W/ 637 OVERRIDE(OP): Performed by: SURGERY

## 2019-06-26 PROCEDURE — A9270 NON-COVERED ITEM OR SERVICE: HCPCS | Performed by: SURGERY

## 2019-06-26 PROCEDURE — 80053 COMPREHEN METABOLIC PANEL: CPT

## 2019-06-26 PROCEDURE — 85007 BL SMEAR W/DIFF WBC COUNT: CPT

## 2019-06-26 PROCEDURE — 82962 GLUCOSE BLOOD TEST: CPT

## 2019-06-26 PROCEDURE — 700111 HCHG RX REV CODE 636 W/ 250 OVERRIDE (IP): Performed by: SURGERY

## 2019-06-26 PROCEDURE — 99233 SBSQ HOSP IP/OBS HIGH 50: CPT | Performed by: SURGERY

## 2019-06-26 PROCEDURE — 84134 ASSAY OF PREALBUMIN: CPT

## 2019-06-26 PROCEDURE — 86140 C-REACTIVE PROTEIN: CPT

## 2019-06-26 PROCEDURE — 770022 HCHG ROOM/CARE - ICU (200)

## 2019-06-26 PROCEDURE — 700101 HCHG RX REV CODE 250: Performed by: SURGERY

## 2019-06-26 PROCEDURE — 85027 COMPLETE CBC AUTOMATED: CPT

## 2019-06-26 RX ORDER — INDOMETHACIN 50 MG/1
50 CAPSULE ORAL EVERY MORNING
COMMUNITY
End: 2021-03-26 | Stop reason: SDUPTHER

## 2019-06-26 RX ADMIN — AMPICILLIN SODIUM AND SULBACTAM SODIUM 3 G: 2; 1 INJECTION, POWDER, FOR SOLUTION INTRAMUSCULAR; INTRAVENOUS at 23:03

## 2019-06-26 RX ADMIN — LINEZOLID 600 MG: 600 INJECTION, SOLUTION INTRAVENOUS at 17:12

## 2019-06-26 RX ADMIN — POTASSIUM CHLORIDE: 2 INJECTION, SOLUTION, CONCENTRATE INTRAVENOUS at 20:01

## 2019-06-26 RX ADMIN — POLYETHYLENE GLYCOL 3350 1 PACKET: 17 POWDER, FOR SOLUTION ORAL at 17:10

## 2019-06-26 RX ADMIN — Medication: at 23:04

## 2019-06-26 RX ADMIN — AMPICILLIN SODIUM AND SULBACTAM SODIUM 3 G: 2; 1 INJECTION, POWDER, FOR SOLUTION INTRAMUSCULAR; INTRAVENOUS at 05:16

## 2019-06-26 RX ADMIN — AMPICILLIN SODIUM AND SULBACTAM SODIUM 3 G: 2; 1 INJECTION, POWDER, FOR SOLUTION INTRAMUSCULAR; INTRAVENOUS at 17:10

## 2019-06-26 RX ADMIN — METOCLOPRAMIDE 10 MG: 5 INJECTION, SOLUTION INTRAMUSCULAR; INTRAVENOUS at 23:03

## 2019-06-26 RX ADMIN — LINEZOLID 600 MG: 600 INJECTION, SOLUTION INTRAVENOUS at 05:16

## 2019-06-26 RX ADMIN — METOCLOPRAMIDE 10 MG: 5 INJECTION, SOLUTION INTRAMUSCULAR; INTRAVENOUS at 05:16

## 2019-06-26 RX ADMIN — AMPICILLIN SODIUM AND SULBACTAM SODIUM 3 G: 2; 1 INJECTION, POWDER, FOR SOLUTION INTRAMUSCULAR; INTRAVENOUS at 00:00

## 2019-06-26 RX ADMIN — METOCLOPRAMIDE 10 MG: 5 INJECTION, SOLUTION INTRAMUSCULAR; INTRAVENOUS at 12:40

## 2019-06-26 RX ADMIN — METOCLOPRAMIDE 10 MG: 5 INJECTION, SOLUTION INTRAMUSCULAR; INTRAVENOUS at 17:10

## 2019-06-26 RX ADMIN — AMPICILLIN SODIUM AND SULBACTAM SODIUM 3 G: 2; 1 INJECTION, POWDER, FOR SOLUTION INTRAMUSCULAR; INTRAVENOUS at 12:30

## 2019-06-26 RX ADMIN — ENOXAPARIN SODIUM 30 MG: 100 INJECTION SUBCUTANEOUS at 05:16

## 2019-06-26 RX ADMIN — ENOXAPARIN SODIUM 30 MG: 100 INJECTION SUBCUTANEOUS at 17:10

## 2019-06-26 RX ADMIN — METOCLOPRAMIDE 10 MG: 5 INJECTION, SOLUTION INTRAMUSCULAR; INTRAVENOUS at 00:00

## 2019-06-26 ASSESSMENT — ENCOUNTER SYMPTOMS
ABDOMINAL DISTENTION: 1
ABDOMINAL PAIN: 0
ROS GI COMMENTS: DECREASED DISTENSION

## 2019-06-26 NOTE — PROGRESS NOTES
Spoke with daughter, Annika on the phone.  Updates provided regarding patient's night and POC for today.  Questions answered.

## 2019-06-26 NOTE — PROGRESS NOTES
2 RN skin note with FLAQUITO Davis.    Devices in place:  Blood pressure, SCDs, pulse ox, central line, and PIV.  Skin assessed under all devices.  Patient has a prevena inplace.  Unable to visualize skin under prevena.    Areas of concern:   - Old ostomy site, approximated with staples, open to air  - Old JOHAN site on RLQ of abdomen with dressing in place  - Midline abdominal incision with prevena in place  - Blisters on abdomen, open to air  - Bilateral elbows, red but blanching, biotain in place bilaterally    Preventative measures in place: mepilex to sacrum, biotain to Bilateral elbows, pillows to support elbows and heels.

## 2019-06-26 NOTE — PROGRESS NOTES
"Family requesting that the physicians add a vitamin B1 and a vitamin C gtt to the patient's regimen.  The patient's family states that \"their friend in [place] has been using this for sepsis.\"  I informed the family that I would ask the physician about their request.    "

## 2019-06-26 NOTE — PROGRESS NOTES
Pt seen and examined  WBC up but CRP down and afebrile  Stooling  Abd softer  Cont TPN.   Ok to adv diet.   Cont mobility.

## 2019-06-26 NOTE — PROGRESS NOTES
Hilary Salazar, and this RN at bedside to round on patient and family.  Extensive conversation regarding past experience on [facility] and new expectations for when the patient transfers to [facility] in the future.  All questions answered.

## 2019-06-26 NOTE — CARE PLAN
Problem: Communication  Goal: The ability to communicate needs accurately and effectively will improve    Intervention: Educate patient and significant other/support system about the plan of care, procedures, treatments, medications and allow for questions  Plan of care discussed with patient and multiple family members.  Multiple questions answered.        Problem: Mobility  Goal: Risk for activity intolerance will decrease    Intervention: Encourage patient to increase activity level in collaboration with Interdisciplinary Team  Patient educated on the importance of mobilization.  Set a goal with patient to ambulate 3 times around the unit.

## 2019-06-26 NOTE — PROGRESS NOTES
Pharmacy TPN Day # 6       2019    Dosing Weight   78 kg             TPN currently providing 100% of goal                                                  TPN goal: 7461-5363 kcal/day including 1.5 gm/kg/day Protein                                                  TPN indication: Prolonged NPO with bowel resection     Pertinent PMH: Admitted 19 for ileostomy takedown. Patient subsequently developed an ileus and was without oral intake for 7 days. Patient taken back to the OR on 19 and found to have colonic obstruction secondary to mesenteric hematoma and herniated loop of small bowel. Anastomotic takedown was completed and obstruction resolved. Expected prolonged NPO with ileus following second procedure, thus patient initiated on parenteral nutrition pending return of bowel function.    Temp (24hrs), Av.5 °C (99.5 °F), Min:36.7 °C (98 °F), Max:38.2 °C (100.7 °F)  .  Recent Labs      19   0340  19   0416   SODIUM  138  133*   POTASSIUM  3.9  3.7   CHLORIDE  107  104   CO2  24  22   BUN  14  13   CREATININE  0.70  0.67   GLUCOSE  147*  148*   CALCIUM  7.6*  7.7*   ASTSGOT  17  18   ALTSGPT  14  18   ALBUMIN  2.2*  2.4*   TBILIRUBIN  7.3*  5.1*   PHOSPHORUS  2.4*   --    MAGNESIUM  2.1   --    PREALBUMIN  8.0*  11.0*     Accu-Checks  Recent Labs      19   0913  19   0059  19   0952   POCGLUCOSE  142*  128*  122*       Vitals:    19 0400 19 0400 19 0400 19 0400   BP:       Weight: 93.1 kg (205 lb 4 oz) 92.8 kg (204 lb 9.4 oz) 92 kg (202 lb 13.2 oz) 88.9 kg (195 lb 15.8 oz)   Height:           Intake/Output Summary (Last 24 hours) at 19 1132  Last data filed at 19 1000   Gross per 24 hour   Intake           3512.6 ml   Output             3575 ml   Net            -62.4 ml       Orders Placed This Encounter   Procedures   • Diet Order Clear Liquid     Standing Status:   Standing     Number of Occurrences:   1     Order Specific Question:    Diet:     Answer:   Clear Liquid [10]         TPN for past 72 hours (Show up to 3 orders; newest on the left. Changes between the two most recent orders are indicated.)     Start date and time   06/23/2019 2000 06/21/2019 2000      TPN Central Line Formulation [[id number]] TPN Central Line Formulation [[id number]]    Order Status  Active Completed    Last Given  06/25/2019 2007 06/22/2019 1959       Base    Clinisol 15%  120 g 60 g    dextrose 70%  340 g 135 g    fat emulsions 20%  55 g 30 g       Additives    potassium phosphates  20 mmol 10 mmol    potassium chloride  60 mEq 60 mEq    sodium acetate  150 mEq 150 mEq    sodium chloride  80 mEq 80 mEq    magnesium sulfate  12 mEq 12 mEq    calcium GLUConate  4.65 mEq 4.65 mEq    M.T.E. -5 Adult  1 mL 1 mL    M.V.I. ADULT  10 mL 10 mL    famotidine  40 mg 40 mg       Energy Contribution    Proteins  -- --    Dextrose  -- --    Lipids  -- --    Total  0 kcal 0 kcal       Electrolyte Ion Calculated Amount    Sodium  -- --    Potassium  -- --    Calcium  -- --    Magnesium  -- --    Aluminum  -- --    Phosphate  -- --    Chloride  -- --    Acetate  -- --       Other    Total Protein  -- --    Total Protein/kg  -- --    Glucose Infusion Rate  -- --    Osmolarity  -- --    Volume  1,800 mL 1,800 mL    Rate  75 mL/hr 75 mL/hr    Dosing Weight  93.1 kg 91.8 kg    Infusion Site  [facility]            This formula provides:  % kcal as lipids = 25  Grams protein/kg = 1.5  Non-protein calories = 1706  Kcals/kg = 28  Total daily calories = 2186    Comments:  Bowel function returning, however patient not tolerating diet. Choosing not to eat.   Will consider caloric reduction tomorrow if patient continues to express lack of interest in oral intake.  No change in macronutrients, patient tolerating full TPN.   Alk phos increasing, possible early sign of PNALD.   Hyponatremia, likely related to volume status. Stop LR MIVF. TPN continue at 75 ml/hr.    Famotidine included in  TPN.  No insulin coverage needed. Maintain FSBG less than 180 mg/dL.       Senthil Stephens, PharmD

## 2019-06-26 NOTE — CARE PLAN
Problem: Communication  Goal: The ability to communicate needs accurately and effectively will improve    Intervention: Educate patient and significant other/support system about the plan of care, procedures, treatments, medications and allow for questions  Patient and family updated on plan of care. All questions answered at this time.       Problem: Infection  Goal: Will remain free from infection    Intervention: Implement standard precautions and perform hand washing before and after patient contact  Standard precautions in place. Hand hygiene performed before and after patient contact.

## 2019-06-26 NOTE — PROGRESS NOTES
2 RN skin check with FLAQUITO Davis.    Devices in the place: SCDs, pulse ox, blood pressure cuff, prevena wound vac, central line and PIV x 1.  Skin assessed under all devices, except under prevena.    Preventative measures in place: mepilex to sacrum, waffle cushion while in chair, turned [id number], pillows in use for support, and heels floated on pillows.    Following areas of concern:   Multiple blisters to RLQ abdomen around JOHAN site. Dressing in place.  Midline prevena dressing intact.  Bilateral elbows red but blanching, pillows in use for support.

## 2019-06-26 NOTE — CARE PLAN
Problem: Hyperinflation:  Goal: Prevent or improve atelectasis    Intervention: Instruct incentive spirometry usage  IS value [id number]

## 2019-06-26 NOTE — PROGRESS NOTES
Trauma / Surgical Daily Progress Note    Date of Service  6/26/2019    Chief Complaint  59 y.o. male admitted 6/13/2019 for colostomy takedown. Required revision of anastomosis and evacuation of infected hematoma.    Interval Events  Hospital day #14  Pt currently requires ICU care  Seen on rounds and discussed with multidisciplinary team  Physiologic derangements preclude floor transfer  Events and interventions include:  Integration of multiple data points and associated complex medical decisions   Nutritional support-remains on TPN,advancing diet slowly  Aggressive pulmonary toilet  Ongoing infusion of abx for infected hematoma  Pain control  Jaundice improving    Review of Systems  Review of Systems   Gastrointestinal: Positive for abdominal distention. Negative for abdominal pain.        Decreased distension   All other systems reviewed and are negative.       Vital Signs for last 24 hours  Temp:  [36.7 °C (98 °F)-38.2 °C (100.7 °F)] 37.6 °C (99.6 °F)  Pulse:  [] 83  Resp:  [17-34] 20  SpO2:  [95 %-100 %] 97 %    Hemodynamic parameters for last 24 hours       Respiratory Data     Respiration: 20, Pulse Oximetry: 97 %, O2 Daily Delivery Respiratory : Room Air with O2 Available     Work Of Breathing / Effort: Mild  RUL Breath Sounds: Diminished, RML Breath Sounds: Diminished, RLL Breath Sounds: Diminished, BARBARA Breath Sounds: Diminished, LLL Breath Sounds: Diminished    Physical Exam  Physical Exam   Constitutional: He is oriented to person, place, and time. He appears well-developed and well-nourished. No distress.   HENT:   Head: Normocephalic and atraumatic.   Eyes: Pupils are equal, round, and reactive to light. EOM are normal. No scleral icterus.   Neck: Normal range of motion. Neck supple. No tracheal deviation present.   Cardiovascular: Normal rate, regular rhythm and intact distal pulses.  Exam reveals no gallop and no friction rub.    No murmur heard.  Pulmonary/Chest: Effort normal and breath  sounds normal. No respiratory distress. He has no wheezes.   Abdominal: Soft. He exhibits distension. There is no tenderness.   Musculoskeletal: Normal range of motion. He exhibits edema.   Neurological: He is alert and oriented to person, place, and time. No cranial nerve deficit.   Skin: Skin is warm and dry. No erythema.   Psychiatric: He has a normal mood and affect. His behavior is normal.       Laboratory  Recent Results (from the past 24 hour(s))   HEMOGLOBIN AND HEMATOCRIT    Collection Time: 06/25/19  4:15 PM   Result Value Ref Range    Hemoglobin 7.9 (L) 14.0 - 18.0 g/dL    Hematocrit 23.7 (L) 42.0 - 52.0 %   ACCU-CHEK GLUCOSE    Collection Time: 06/26/19 12:59 AM   Result Value Ref Range    Glucose - Accu-Ck 128 (H) 65 - 99 mg/dL   CBC WITH DIFFERENTIAL    Collection Time: 06/26/19  4:16 AM   Result Value Ref Range    WBC 17.6 (H) 4.8 - 10.8 K/uL    RBC 2.59 (L) 4.70 - 6.10 M/uL    Hemoglobin 7.3 (L) 14.0 - 18.0 g/dL    Hematocrit 22.1 (L) 42.0 - 52.0 %    MCV 85.3 81.4 - 97.8 fL    MCH 28.2 27.0 - 33.0 pg    MCHC 33.0 (L) 33.7 - 35.3 g/dL    RDW 50.1 (H) 35.9 - 50.0 fL    Platelet Count 433 164 - 446 K/uL    MPV 10.3 9.0 - 12.9 fL    Neutrophils-Polys 83.30 (H) 44.00 - 72.00 %    Lymphocytes 6.10 (L) 22.00 - 41.00 %    Monocytes 7.90 0.00 - 13.40 %    Eosinophils 0.90 0.00 - 6.90 %    Basophils 0.00 0.00 - 1.80 %    Nucleated RBC 0.00 /100 WBC    Neutrophils (Absolute) 14.66 (H) 1.82 - 7.42 K/uL    Lymphs (Absolute) 1.07 1.00 - 4.80 K/uL    Monos (Absolute) 1.39 (H) 0.00 - 0.85 K/uL    Eos (Absolute) 0.16 0.00 - 0.51 K/uL    Baso (Absolute) 0.00 0.00 - 0.12 K/uL    NRBC (Absolute) 0.00 K/uL    Anisocytosis 1+     Microcytosis 1+    Comp Metabolic Panel    Collection Time: 06/26/19  4:16 AM   Result Value Ref Range    Sodium 133 (L) 135 - 145 mmol/L    Potassium 3.7 3.6 - 5.5 mmol/L    Chloride 104 96 - 112 mmol/L    Co2 22 20 - 33 mmol/L    Anion Gap 7.0 0.0 - 11.9    Glucose 148 (H) 65 - 99 mg/dL    Bun  13 8 - 22 mg/dL    Creatinine 0.67 0.50 - 1.40 mg/dL    Calcium 7.7 (L) 8.5 - 10.5 mg/dL    AST(SGOT) 18 12 - 45 U/L    ALT(SGPT) 18 2 - 50 U/L    Alkaline Phosphatase 209 (H) 30 - 99 U/L    Total Bilirubin 5.1 (H) 0.1 - 1.5 mg/dL    Albumin 2.4 (L) 3.2 - 4.9 g/dL    Total Protein 4.9 (L) 6.0 - 8.2 g/dL    Globulin 2.5 1.9 - 3.5 g/dL    A-G Ratio 1.0 g/dL   CRP QUANTITIVE (NON-CARDIAC)    Collection Time: 06/26/19  4:16 AM   Result Value Ref Range    Stat C-Reactive Protein 11.14 (H) 0.00 - 0.75 mg/dL   PREALBUMIN    Collection Time: 06/26/19  4:16 AM   Result Value Ref Range    Pre-Albumin 11.0 (L) 18.0 - 38.0 mg/dL   ESTIMATED GFR    Collection Time: 06/26/19  4:16 AM   Result Value Ref Range    GFR If African American >60 >60 mL/min/1.73 m 2    GFR If Non African American >60 >60 mL/min/1.73 m 2   DIFFERENTIAL MANUAL    Collection Time: 06/26/19  4:16 AM   Result Value Ref Range    Myelocytes 1.80 %    Manual Diff Status PERFORMED    PERIPHERAL SMEAR REVIEW    Collection Time: 06/26/19  4:16 AM   Result Value Ref Range    Peripheral Smear Review see below    PLATELET ESTIMATE    Collection Time: 06/26/19  4:16 AM   Result Value Ref Range    Plt Estimation Normal    MORPHOLOGY    Collection Time: 06/26/19  4:16 AM   Result Value Ref Range    RBC Morphology Present     Large Platelets 1+     Poikilocytosis 1+     Target Cells 1+    ACCU-CHEK GLUCOSE    Collection Time: 06/26/19  9:52 AM   Result Value Ref Range    Glucose - Accu-Ck 122 (H) 65 - 99 mg/dL       Fluids    Intake/Output Summary (Last 24 hours) at 06/26/19 1315  Last data filed at 06/26/19 1200   Gross per 24 hour   Intake           3542.6 ml   Output             3975 ml   Net           -432.4 ml       Core Measures & Quality Metrics  Labs reviewed and Radiology images reviewed  Flor catheter: No Flor  Central line in place: TPN    DVT Prophylaxis: Enoxaparin (Lovenox)  DVT prophylaxis - mechanical: SCDs  Ulcer prophylaxis: Yes  Antibiotics: Treating  active infection/contamination beyond 24 hours perioperative coverage      YADIEL Score    ETOH Screening      Assessment/Plan  Malnutrition (HCC)   Assessment & Plan    6/26 tpn continues-slowly starting po     Mesenteric hematoma   Assessment & Plan    Hematoma evacuated.  Cultures are polymicrobial positive.    Zosyn intially.    6/26:   Xyvox and Unasyn:  abx day #3       Colostomy in place (HCC)- (present on admission)   Assessment & Plan    6/13/19  Resection and closure of colostomy  6/19 having BM's  6/20  Explored for bowel obstruction and infected hematoma.  Anastomosis revised.    6/24  improved distention, post operative ileus resolving, flatus  6/25 clears initiated  6/26 advancing diet slowly     Blood loss anemia   Assessment & Plan    Transfused one unit 6/20 6/22:  1 prbc  Trend.       Lung infiltrate   Assessment & Plan    6/19  Lung infiltrate post op.  Not on vent.  No sputum for culture.  Currently on antibiotics for intraabdominal infection     Respiratory failure following trauma and surgery (HCC)   Assessment & Plan    Extubated 6/21  Tolerating well         Discussed patient condition with RN, RT, Pharmacy, Dietary,  and Patient.

## 2019-06-26 NOTE — PROGRESS NOTES
Daughters at bedside.  Updates provided regarding mobilization, lab results, and plan of care.  Multiple questions answered.

## 2019-06-26 NOTE — PROGRESS NOTES
2 RN skin check complete with FLAQUITO Santacruz.  Devices in place BP cuff, pulse ox, EKG leads, SCDs, central line, PIV.  Skin assessed under the following devices BP cuff, pulse ox, EKG leads, SCDs, central line, PIV.  Preventative measures in place including pillows to float heels and for repositioning, mepilex on sacrum.    Following areas of concern:    -Old ostomy site, approximated with staples, open to air, site CDI   -Old JOHAN site on RLQ of abdomen with dressing in place, CDI   -Midline abdominal incision with prevena in place   -Blisters on abdomen, open to air     The following interventions in place: pillows to float heels and for repositioning, mepilex on sacrum.    Wound consult placedYES/NO: yes, wound already following   Wound reported YES/NO: no  Appropriate LDAs opened YES/NO: yes

## 2019-06-26 NOTE — ASSESSMENT & PLAN NOTE
6/21 Prolonged ileus predicted following 2nd lapartomy. TPN started.  6/25 Oral diet started.  6/28 Tapering of TPN initiated.  6/29 TPN tapered off.  Continue oral alimentation.

## 2019-06-27 ENCOUNTER — APPOINTMENT (OUTPATIENT)
Dept: RADIOLOGY | Facility: MEDICAL CENTER | Age: 59
DRG: 329 | End: 2019-06-27
Attending: SURGERY
Payer: MEDICAID

## 2019-06-27 LAB
ALBUMIN SERPL BCP-MCNC: 2.6 G/DL (ref 3.2–4.9)
ALBUMIN/GLOB SERPL: 1 G/DL
ALP SERPL-CCNC: 264 U/L (ref 30–99)
ALT SERPL-CCNC: 22 U/L (ref 2–50)
ANION GAP SERPL CALC-SCNC: 10 MMOL/L (ref 0–11.9)
AST SERPL-CCNC: 20 U/L (ref 12–45)
BASOPHILS # BLD AUTO: 0 % (ref 0–1.8)
BASOPHILS # BLD: 0 K/UL (ref 0–0.12)
BILIRUB SERPL-MCNC: 6 MG/DL (ref 0.1–1.5)
BUN SERPL-MCNC: 14 MG/DL (ref 8–22)
CALCIUM SERPL-MCNC: 7.7 MG/DL (ref 8.5–10.5)
CHLORIDE SERPL-SCNC: 103 MMOL/L (ref 96–112)
CO2 SERPL-SCNC: 20 MMOL/L (ref 20–33)
CREAT SERPL-MCNC: 0.67 MG/DL (ref 0.5–1.4)
EOSINOPHIL # BLD AUTO: 0.19 K/UL (ref 0–0.51)
EOSINOPHIL NFR BLD: 0.9 % (ref 0–6.9)
ERYTHROCYTE [DISTWIDTH] IN BLOOD BY AUTOMATED COUNT: 52.4 FL (ref 35.9–50)
GLOBULIN SER CALC-MCNC: 2.7 G/DL (ref 1.9–3.5)
GLUCOSE BLD-MCNC: 127 MG/DL (ref 65–99)
GLUCOSE BLD-MCNC: 143 MG/DL (ref 65–99)
GLUCOSE BLD-MCNC: 145 MG/DL (ref 65–99)
GLUCOSE SERPL-MCNC: 173 MG/DL (ref 65–99)
HCT VFR BLD AUTO: 22.3 % (ref 42–52)
HGB BLD-MCNC: 7.1 G/DL (ref 14–18)
LYMPHOCYTES # BLD AUTO: 0.72 K/UL (ref 1–4.8)
LYMPHOCYTES NFR BLD: 3.5 % (ref 22–41)
MAGNESIUM SERPL-MCNC: 1.9 MG/DL (ref 1.5–2.5)
MANUAL DIFF BLD: NORMAL
MCH RBC QN AUTO: 27.8 PG (ref 27–33)
MCHC RBC AUTO-ENTMCNC: 31.8 G/DL (ref 33.7–35.3)
MCV RBC AUTO: 87.5 FL (ref 81.4–97.8)
MONOCYTES # BLD AUTO: 1.07 K/UL (ref 0–0.85)
MONOCYTES NFR BLD AUTO: 5.2 % (ref 0–13.4)
MORPHOLOGY BLD-IMP: NORMAL
NEUTROPHILS # BLD AUTO: 18.62 K/UL (ref 1.82–7.42)
NEUTROPHILS NFR BLD: 89.5 % (ref 44–72)
NEUTS BAND NFR BLD MANUAL: 0.9 % (ref 0–10)
NRBC # BLD AUTO: 0 K/UL
NRBC BLD-RTO: 0 /100 WBC
PHOSPHATE SERPL-MCNC: 2.8 MG/DL (ref 2.5–4.5)
PLATELET # BLD AUTO: 491 K/UL (ref 164–446)
PLATELET BLD QL SMEAR: NORMAL
PMV BLD AUTO: 10.4 FL (ref 9–12.9)
POIKILOCYTOSIS BLD QL SMEAR: NORMAL
POTASSIUM SERPL-SCNC: 3.9 MMOL/L (ref 3.6–5.5)
PROCALCITONIN SERPL-MCNC: 0.34 NG/ML
PROT SERPL-MCNC: 5.3 G/DL (ref 6–8.2)
RBC # BLD AUTO: 2.55 M/UL (ref 4.7–6.1)
RBC BLD AUTO: PRESENT
SCHISTOCYTES BLD QL SMEAR: NORMAL
SODIUM SERPL-SCNC: 133 MMOL/L (ref 135–145)
WBC # BLD AUTO: 20.6 K/UL (ref 4.8–10.8)

## 2019-06-27 PROCEDURE — A9270 NON-COVERED ITEM OR SERVICE: HCPCS | Performed by: SURGERY

## 2019-06-27 PROCEDURE — 74177 CT ABD & PELVIS W/CONTRAST: CPT

## 2019-06-27 PROCEDURE — 700111 HCHG RX REV CODE 636 W/ 250 OVERRIDE (IP): Performed by: SURGERY

## 2019-06-27 PROCEDURE — 700102 HCHG RX REV CODE 250 W/ 637 OVERRIDE(OP): Performed by: SURGERY

## 2019-06-27 PROCEDURE — 700105 HCHG RX REV CODE 258: Performed by: SURGERY

## 2019-06-27 PROCEDURE — 83735 ASSAY OF MAGNESIUM: CPT

## 2019-06-27 PROCEDURE — 770022 HCHG ROOM/CARE - ICU (200)

## 2019-06-27 PROCEDURE — 700117 HCHG RX CONTRAST REV CODE 255: Performed by: SURGERY

## 2019-06-27 PROCEDURE — 700112 HCHG RX REV CODE 229: Performed by: SURGERY

## 2019-06-27 PROCEDURE — 82962 GLUCOSE BLOOD TEST: CPT | Mod: 91

## 2019-06-27 PROCEDURE — 80053 COMPREHEN METABOLIC PANEL: CPT

## 2019-06-27 PROCEDURE — 85007 BL SMEAR W/DIFF WBC COUNT: CPT

## 2019-06-27 PROCEDURE — 85027 COMPLETE CBC AUTOMATED: CPT

## 2019-06-27 PROCEDURE — 84100 ASSAY OF PHOSPHORUS: CPT

## 2019-06-27 PROCEDURE — 71045 X-RAY EXAM CHEST 1 VIEW: CPT

## 2019-06-27 PROCEDURE — 700101 HCHG RX REV CODE 250: Performed by: SURGERY

## 2019-06-27 PROCEDURE — 84145 PROCALCITONIN (PCT): CPT

## 2019-06-27 PROCEDURE — 99233 SBSQ HOSP IP/OBS HIGH 50: CPT | Performed by: SURGERY

## 2019-06-27 RX ORDER — OXYCODONE HYDROCHLORIDE 5 MG/1
5-15 TABLET ORAL
Status: DISCONTINUED | OUTPATIENT
Start: 2019-06-27 | End: 2019-07-01

## 2019-06-27 RX ORDER — AMOXICILLIN 250 MG
1 CAPSULE ORAL
Status: DISCONTINUED | OUTPATIENT
Start: 2019-06-27 | End: 2019-07-03 | Stop reason: HOSPADM

## 2019-06-27 RX ORDER — DOCUSATE SODIUM 100 MG/1
100 CAPSULE, LIQUID FILLED ORAL 2 TIMES DAILY
Status: DISCONTINUED | OUTPATIENT
Start: 2019-06-27 | End: 2019-07-03 | Stop reason: HOSPADM

## 2019-06-27 RX ORDER — INDOMETHACIN 50 MG/1
50 CAPSULE ORAL
Status: DISCONTINUED | OUTPATIENT
Start: 2019-06-27 | End: 2019-07-03 | Stop reason: HOSPADM

## 2019-06-27 RX ADMIN — INDOMETHACIN 50 MG: 50 CAPSULE ORAL at 17:37

## 2019-06-27 RX ADMIN — OXYCODONE HYDROCHLORIDE 5 MG: 5 TABLET ORAL at 11:13

## 2019-06-27 RX ADMIN — METOCLOPRAMIDE 10 MG: 5 INJECTION, SOLUTION INTRAMUSCULAR; INTRAVENOUS at 18:13

## 2019-06-27 RX ADMIN — OXYCODONE HYDROCHLORIDE 5 MG: 5 TABLET ORAL at 17:38

## 2019-06-27 RX ADMIN — METOCLOPRAMIDE 10 MG: 5 INJECTION, SOLUTION INTRAMUSCULAR; INTRAVENOUS at 11:53

## 2019-06-27 RX ADMIN — IOHEXOL 50 ML: 240 INJECTION, SOLUTION INTRATHECAL; INTRAVASCULAR; INTRAVENOUS; ORAL at 16:09

## 2019-06-27 RX ADMIN — ENOXAPARIN SODIUM 30 MG: 100 INJECTION SUBCUTANEOUS at 05:02

## 2019-06-27 RX ADMIN — METOCLOPRAMIDE 10 MG: 5 INJECTION, SOLUTION INTRAMUSCULAR; INTRAVENOUS at 23:31

## 2019-06-27 RX ADMIN — LINEZOLID 600 MG: 600 INJECTION, SOLUTION INTRAVENOUS at 18:13

## 2019-06-27 RX ADMIN — METHYLNALTREXONE BROMIDE 12 MG: 12 INJECTION, SOLUTION SUBCUTANEOUS at 11:00

## 2019-06-27 RX ADMIN — AMPICILLIN SODIUM AND SULBACTAM SODIUM 3 G: 2; 1 INJECTION, POWDER, FOR SOLUTION INTRAMUSCULAR; INTRAVENOUS at 11:53

## 2019-06-27 RX ADMIN — AMPICILLIN SODIUM AND SULBACTAM SODIUM 3 G: 2; 1 INJECTION, POWDER, FOR SOLUTION INTRAMUSCULAR; INTRAVENOUS at 23:31

## 2019-06-27 RX ADMIN — LINEZOLID 600 MG: 600 INJECTION, SOLUTION INTRAVENOUS at 05:02

## 2019-06-27 RX ADMIN — DOCUSATE SODIUM 100 MG: 100 CAPSULE, LIQUID FILLED ORAL at 17:38

## 2019-06-27 RX ADMIN — AMPICILLIN SODIUM AND SULBACTAM SODIUM 3 G: 2; 1 INJECTION, POWDER, FOR SOLUTION INTRAMUSCULAR; INTRAVENOUS at 17:34

## 2019-06-27 RX ADMIN — ENOXAPARIN SODIUM 30 MG: 100 INJECTION SUBCUTANEOUS at 17:38

## 2019-06-27 RX ADMIN — IOHEXOL 100 ML: 350 INJECTION, SOLUTION INTRAVENOUS at 16:08

## 2019-06-27 RX ADMIN — SENNOSIDES,DOCUSATE SODIUM 1 TABLET: 8.6; 5 TABLET, FILM COATED ORAL at 20:27

## 2019-06-27 RX ADMIN — AMPICILLIN SODIUM AND SULBACTAM SODIUM 3 G: 2; 1 INJECTION, POWDER, FOR SOLUTION INTRAMUSCULAR; INTRAVENOUS at 05:02

## 2019-06-27 RX ADMIN — METOCLOPRAMIDE 10 MG: 5 INJECTION, SOLUTION INTRAMUSCULAR; INTRAVENOUS at 05:02

## 2019-06-27 RX ADMIN — INDOMETHACIN 50 MG: 50 CAPSULE ORAL at 11:03

## 2019-06-27 RX ADMIN — POTASSIUM CHLORIDE: 2 INJECTION, SOLUTION, CONCENTRATE INTRAVENOUS at 20:25

## 2019-06-27 ASSESSMENT — ENCOUNTER SYMPTOMS
ABDOMINAL DISTENTION: 1
SHORTNESS OF BREATH: 0
ROS GI COMMENTS: DECREASED DISTENSION
ABDOMINAL PAIN: 0

## 2019-06-27 NOTE — PROGRESS NOTES
Pharmacy TPN Day # 7       2019    Dosing Weight   78 kg             TPN currently providing 100% of goal                                                  TPN goal: 7854-3739 kcal/day including 1.5 gm/kg/day Protein                                                  TPN indication: Prolonged NPO with bowel resection     Pertinent PMH: Admitted 19 for ileostomy takedown. Patient subsequently developed an ileus and was without oral intake for 7 days. Patient taken back to the OR on 19 and found to have colonic obstruction secondary to mesenteric hematoma and herniated loop of small bowel. Anastomotic takedown was completed and obstruction resolved. Expected prolonged NPO with ileus following second procedure, thus patient initiated on parenteral nutrition pending return of bowel function.    Temp (24hrs), Av.1 °C (100.5 °F), Min:36.9 °C (98.5 °F), Max:38.6 °C (101.4 °F)  .  Recent Labs      19   0416  19   0454   SODIUM  133*  133*   POTASSIUM  3.7  3.9   CHLORIDE  104  103   CO2  22  20   BUN  13  14   CREATININE  0.67  0.67   GLUCOSE  148*  173*   CALCIUM  7.7*  7.7*   ASTSGOT  18  20   ALTSGPT  18  22   ALBUMIN  2.4*  2.6*   TBILIRUBIN  5.1*  6.0*   PHOSPHORUS   --   2.8   MAGNESIUM   --   1.9   PREALBUMIN  11.0*   --      Accu-Checks  Recent Labs      19   1727  19   0249  19   1025   POCGLUCOSE  117*  127*  143*       Vitals:    19 0400 19 0400 19 0400 19 0400   BP:       Weight: 92.8 kg (204 lb 9.4 oz) 92 kg (202 lb 13.2 oz) 88.9 kg (195 lb 15.8 oz) 88.5 kg (195 lb 1.7 oz)   Height:           Intake/Output Summary (Last 24 hours) at 19 1208  Last data filed at 19 1016   Gross per 24 hour   Intake           2091.6 ml   Output             2550 ml   Net           -458.4 ml       Orders Placed This Encounter   Procedures   • Diet Order Clear Liquid     Standing Status:   Standing     Number of Occurrences:   1     Order Specific  Question:   Diet:     Answer:   Clear Liquid [10]         TPN for past 72 hours (Show up to 3 orders; newest on the left. Changes between the two most recent orders are indicated.)     Start date and time   06/27/2019 2000 06/23/2019 2000 06/21/2019 2000      TPN Central Line Formulation [[id number]] TPN Central Line Formulation [[id number]] TPN Central Line Formulation [[id number]]    Order Status  Active Last Dose in Progress Completed    Last Given   06/26/2019 2001 06/22/2019 1959       Base    Clinisol 15%  100 g 120 g 60 g    dextrose 70%  200 g 340 g 135 g    fat emulsions 20%  20 g 55 g 30 g       Additives    potassium phosphates  20 mmol 20 mmol 10 mmol    potassium chloride  60 mEq 60 mEq 60 mEq    sodium acetate  150 mEq 150 mEq 150 mEq    sodium chloride  80 mEq 80 mEq 80 mEq    magnesium sulfate  12 mEq 12 mEq 12 mEq    calcium GLUConate  4.65 mEq 4.65 mEq 4.65 mEq    M.T.E. -5 Adult  1 mL 1 mL 1 mL    M.V.I. ADULT  10 mL 10 mL 10 mL    famotidine  40 mg 40 mg 40 mg       Energy Contribution    Proteins  -- -- --    Dextrose  -- -- --    Lipids  -- -- --    Total  0 kcal 0 kcal 0 kcal       Electrolyte Ion Calculated Amount    Sodium  -- -- --    Potassium  -- -- --    Calcium  -- -- --    Magnesium  -- -- --    Aluminum  -- -- --    Phosphate  -- -- --    Chloride  -- -- --    Acetate  -- -- --       Other    Total Protein  -- -- --    Total Protein/kg  -- -- --    Glucose Infusion Rate  -- -- --    Osmolarity  -- -- --    Volume  1,800 mL 1,800 mL 1,800 mL    Rate  75 mL/hr 75 mL/hr 75 mL/hr    Dosing Weight  88.5 kg 93.1 kg 91.8 kg    Infusion Site  Central Central Central            This formula provides:  % kcal as lipids = 16%  Grams protein/kg = 1.3 gm/kg  Non-protein calories = 880 kcal  Kcals/kg = 16 kcal/kg  Total daily calories = 1280 kcal    Comments:  Calories reduced to help stimulate appetite.  Increasing oral intake with medications. Diet not yet advanced given distension.   Sodium stable.    FSBG within goal. Maintain FSBG less than 180 mg/dl.   TPN providing 1800 ml/day.         Senthil Stephens, PharmD

## 2019-06-27 NOTE — PROGRESS NOTES
2 RN skin check complete with FLAQUITO Campuzano.  Devices in place central line, EKG leads, BP cuff, pulse ox, PIV, SCDs, Prevena.  Skin assessed under the following devices central line, EKG leads, BP cuff, pulse ox, PIV, SCDs.  Preventative measures in place including Pillows and mepilex on sacrum and biatain on bilateral elbows.    Following areas of concern:   - Old ostomy site, approximated with staples, open to air  - Old JOHAN site on RLQ of abdomen with dressing in place, CDI  - Midline abdominal incision with prevena in place  - Blisters on abdomen, open to air  - Bilateral elbows, red but blanching, biatain in place bilaterally    The following interventions in place mepilex to sacrum, biatain to Bilateral elbows, pillows to support elbows and heels.    Wound consult placedYES/NO: N\A    Wound reported YES/NO: N\A  Appropriate LDAs opened YES/NO: N\A

## 2019-06-27 NOTE — CARE PLAN
Problem: Pain Management  Goal: Pain level will decrease to patient's comfort goal    Intervention: Follow pain managment plan developed in collaboration with patient and Interdisciplinary Team  Pain assessed q2hr using 0-10 pain scale. Following pain management plan developed by interdisciplinary care team. Patient educated to use PCA when needed. Button within reach.       Problem: Mobility  Goal: Risk for activity intolerance will decrease    Intervention: Encourage patient to increase activity level in collaboration with Interdisciplinary Team  Patient encouraged to mobilize as tolerated with assistance. Patient educated on the importance of mobility.

## 2019-06-27 NOTE — PROGRESS NOTES
Trauma / Surgical Daily Progress Note    Date of Service  6/27/2019    Chief Complaint  59 y.o. male admitted 6/13/2019 for colostomy takedown. Required revision of anastomosis and evacuation of infected hematoma.    Interval Events  Hospital day #15  Pt currently requires ICU care  Seen on rounds and discussed with multidisciplinary team  Physiologic derangements preclude floor transfer  Events and interventions include:  Integration of multiple data points and associated complex medical decisions   Nutritional support-remains on TPN,advancing diet slowly  Aggressive pulmonary toilet  Ongoing infusion of abx for infected hematoma  Pain control  CT today to evaluate for abscess in light of increased WBC    Review of Systems  Review of Systems   Respiratory: Negative for shortness of breath.    Gastrointestinal: Positive for abdominal distention. Negative for abdominal pain.        Decreased distension   All other systems reviewed and are negative.       Vital Signs for last 24 hours  Temp:  [36.9 °C (98.5 °F)-38.6 °C (101.4 °F)] 38.6 °C (101.4 °F)  Pulse:  [80-99] 98  Resp:  [18-26] 23  SpO2:  [93 %-100 %] 99 %    Hemodynamic parameters for last 24 hours       Respiratory Data     Respiration: (!) 23, Pulse Oximetry: 99 %, O2 Daily Delivery Respiratory : Room Air with O2 Available     Work Of Breathing / Effort: Mild  RUL Breath Sounds: Clear, RML Breath Sounds: Diminished, RLL Breath Sounds: Diminished, BARBARA Breath Sounds: Clear, LLL Breath Sounds: Diminished    Physical Exam  Physical Exam   Constitutional: He is oriented to person, place, and time. He appears well-developed and well-nourished. No distress.   HENT:   Head: Normocephalic and atraumatic.   Eyes: Pupils are equal, round, and reactive to light. EOM are normal. Right eye exhibits no discharge. Left eye exhibits no discharge. No scleral icterus.   Neck: Normal range of motion. Neck supple. No JVD present. No tracheal deviation present.   Cardiovascular:  Normal rate, regular rhythm and intact distal pulses.  Exam reveals no gallop and no friction rub.    No murmur heard.  Pulmonary/Chest: Effort normal and breath sounds normal. No respiratory distress. He has no wheezes.   Abdominal: Soft. He exhibits distension. There is no tenderness.   Musculoskeletal: Normal range of motion. He exhibits edema.   Neurological: He is alert and oriented to person, place, and time. No cranial nerve deficit. Coordination normal.   Skin: Skin is warm and dry. No rash noted. No erythema.   Psychiatric: He has a normal mood and affect. His behavior is normal.       Laboratory  Recent Results (from the past 24 hour(s))   HEMOGLOBIN AND HEMATOCRIT    Collection Time: 06/26/19  5:00 PM   Result Value Ref Range    Hemoglobin 7.5 (L) 14.0 - 18.0 g/dL    Hematocrit 23.5 (L) 42.0 - 52.0 %   ACCU-CHEK GLUCOSE    Collection Time: 06/26/19  5:27 PM   Result Value Ref Range    Glucose - Accu-Ck 117 (H) 65 - 99 mg/dL   ACCU-CHEK GLUCOSE    Collection Time: 06/27/19  2:49 AM   Result Value Ref Range    Glucose - Accu-Ck 127 (H) 65 - 99 mg/dL   Magnesium: Every Monday and Thursday AM    Collection Time: 06/27/19  4:54 AM   Result Value Ref Range    Magnesium 1.9 1.5 - 2.5 mg/dL   Phosphorus: Every Monday and Thursday AM    Collection Time: 06/27/19  4:54 AM   Result Value Ref Range    Phosphorus 2.8 2.5 - 4.5 mg/dL   COMP METABOLIC PANEL    Collection Time: 06/27/19  4:54 AM   Result Value Ref Range    Sodium 133 (L) 135 - 145 mmol/L    Potassium 3.9 3.6 - 5.5 mmol/L    Chloride 103 96 - 112 mmol/L    Co2 20 20 - 33 mmol/L    Anion Gap 10.0 0.0 - 11.9    Glucose 173 (H) 65 - 99 mg/dL    Bun 14 8 - 22 mg/dL    Creatinine 0.67 0.50 - 1.40 mg/dL    Calcium 7.7 (L) 8.5 - 10.5 mg/dL    AST(SGOT) 20 12 - 45 U/L    ALT(SGPT) 22 2 - 50 U/L    Alkaline Phosphatase 264 (H) 30 - 99 U/L    Total Bilirubin 6.0 (H) 0.1 - 1.5 mg/dL    Albumin 2.6 (L) 3.2 - 4.9 g/dL    Total Protein 5.3 (L) 6.0 - 8.2 g/dL     Globulin 2.7 1.9 - 3.5 g/dL    A-G Ratio 1.0 g/dL   PROCALCITONIN    Collection Time: 06/27/19  4:54 AM   Result Value Ref Range    Procalcitonin 0.34 (H) <0.25 ng/mL   CBC WITH DIFFERENTIAL    Collection Time: 06/27/19  4:54 AM   Result Value Ref Range    WBC 20.6 (H) 4.8 - 10.8 K/uL    RBC 2.55 (L) 4.70 - 6.10 M/uL    Hemoglobin 7.1 (L) 14.0 - 18.0 g/dL    Hematocrit 22.3 (L) 42.0 - 52.0 %    MCV 87.5 81.4 - 97.8 fL    MCH 27.8 27.0 - 33.0 pg    MCHC 31.8 (L) 33.7 - 35.3 g/dL    RDW 52.4 (H) 35.9 - 50.0 fL    Platelet Count 491 (H) 164 - 446 K/uL    MPV 10.4 9.0 - 12.9 fL    Neutrophils-Polys 89.50 (H) 44.00 - 72.00 %    Lymphocytes 3.50 (L) 22.00 - 41.00 %    Monocytes 5.20 0.00 - 13.40 %    Eosinophils 0.90 0.00 - 6.90 %    Basophils 0.00 0.00 - 1.80 %    Nucleated RBC 0.00 /100 WBC    Neutrophils (Absolute) 18.62 (H) 1.82 - 7.42 K/uL    Lymphs (Absolute) 0.72 (L) 1.00 - 4.80 K/uL    Monos (Absolute) 1.07 (H) 0.00 - 0.85 K/uL    Eos (Absolute) 0.19 0.00 - 0.51 K/uL    Baso (Absolute) 0.00 0.00 - 0.12 K/uL    NRBC (Absolute) 0.00 K/uL   ESTIMATED GFR    Collection Time: 06/27/19  4:54 AM   Result Value Ref Range    GFR If African American >60 >60 mL/min/1.73 m 2    GFR If Non African American >60 >60 mL/min/1.73 m 2   DIFFERENTIAL MANUAL    Collection Time: 06/27/19  4:54 AM   Result Value Ref Range    Bands-Stabs 0.90 0.00 - 10.00 %    Manual Diff Status PERFORMED    PERIPHERAL SMEAR REVIEW    Collection Time: 06/27/19  4:54 AM   Result Value Ref Range    Peripheral Smear Review see below    PLATELET ESTIMATE    Collection Time: 06/27/19  4:54 AM   Result Value Ref Range    Plt Estimation Increased    MORPHOLOGY    Collection Time: 06/27/19  4:54 AM   Result Value Ref Range    RBC Morphology Present     Poikilocytosis 1+     Schistocytes 1+    ACCU-CHEK GLUCOSE    Collection Time: 06/27/19 10:25 AM   Result Value Ref Range    Glucose - Accu-Ck 143 (H) 65 - 99 mg/dL       Fluids    Intake/Output Summary (Last 24  hours) at 06/27/19 1238  Last data filed at 06/27/19 1016   Gross per 24 hour   Intake           2091.6 ml   Output             2550 ml   Net           -458.4 ml       Core Measures & Quality Metrics  Labs reviewed and Radiology images reviewed  Flor catheter: No Flor  Central line in place: TPN    DVT Prophylaxis: Enoxaparin (Lovenox)  DVT prophylaxis - mechanical: SCDs  Ulcer prophylaxis: Yes  Antibiotics: Treating active infection/contamination beyond 24 hours perioperative coverage      YADIEL Score    ETOH Screening      Assessment/Plan  Malnutrition (HCC)   Assessment & Plan    6/26 tpn continues-slowly starting po     Mesenteric hematoma   Assessment & Plan    Hematoma evacuated.  Cultures are polymicrobial positive.    Zosyn intially.    6/27:   Xyvox and Unasyn:  abx day #4       Colostomy in place ([facility])- (present on admission)   Assessment & Plan    6/13/19  Resection and closure of colostomy  6/19 having BM's  6/20  Explored for bowel obstruction and infected hematoma.  Anastomosis revised.    6/24  improved distention, post operative ileus resolving, flatus  6/25 clears initiated  6/27 advancing diet slowly     Blood loss anemia   Assessment & Plan    Transfused one unit 6/20 6/22:  1 prbc  Trend.       Lung infiltrate   Assessment & Plan    6/19  Lung infiltrate post op.  Not on vent.  No sputum for culture.  Currently on antibiotics for intraabdominal infection     Respiratory failure following trauma and surgery ([facility])   Assessment & Plan    Extubated 6/21  Tolerating well         Discussed patient condition with RN, RT, Pharmacy, Dietary,  and Patient.

## 2019-06-27 NOTE — PROGRESS NOTES
Pt seen and examined  Afebrile  WBC up to 20 K  Stooling  Abd softer, less tender  Other labs reassuring  Will get abd CT to rule out abscess  Appreciate ICU assistance.

## 2019-06-28 ENCOUNTER — APPOINTMENT (OUTPATIENT)
Dept: RADIOLOGY | Facility: MEDICAL CENTER | Age: 59
DRG: 329 | End: 2019-06-28
Attending: SURGERY
Payer: MEDICAID

## 2019-06-28 LAB
ABO GROUP BLD: NORMAL
ALBUMIN SERPL BCP-MCNC: 2.5 G/DL (ref 3.2–4.9)
ALBUMIN/GLOB SERPL: 1 G/DL
ALP SERPL-CCNC: 213 U/L (ref 30–99)
ALT SERPL-CCNC: 21 U/L (ref 2–50)
ANION GAP SERPL CALC-SCNC: 9 MMOL/L (ref 0–11.9)
APTT PPP: 34.6 SEC (ref 24.7–36)
AST SERPL-CCNC: 19 U/L (ref 12–45)
BARCODED ABORH UBTYP: 5100
BARCODED PRD CODE UBPRD: NORMAL
BARCODED UNIT NUM UBUNT: NORMAL
BASOPHILS # BLD AUTO: 0.1 % (ref 0–1.8)
BASOPHILS # BLD: 0.02 K/UL (ref 0–0.12)
BILIRUB SERPL-MCNC: 5.6 MG/DL (ref 0.1–1.5)
BLD GP AB SCN SERPL QL: NORMAL
BUN SERPL-MCNC: 18 MG/DL (ref 8–22)
CALCIUM SERPL-MCNC: 7.7 MG/DL (ref 8.5–10.5)
CHLORIDE SERPL-SCNC: 108 MMOL/L (ref 96–112)
CO2 SERPL-SCNC: 21 MMOL/L (ref 20–33)
COMPONENT R 8504R: NORMAL
CREAT SERPL-MCNC: 0.62 MG/DL (ref 0.5–1.4)
EOSINOPHIL # BLD AUTO: 0.11 K/UL (ref 0–0.51)
EOSINOPHIL NFR BLD: 0.6 % (ref 0–6.9)
ERYTHROCYTE [DISTWIDTH] IN BLOOD BY AUTOMATED COUNT: 49.9 FL (ref 35.9–50)
GLOBULIN SER CALC-MCNC: 2.6 G/DL (ref 1.9–3.5)
GLUCOSE BLD-MCNC: 133 MG/DL (ref 65–99)
GLUCOSE BLD-MCNC: 135 MG/DL (ref 65–99)
GLUCOSE BLD-MCNC: 167 MG/DL (ref 65–99)
GLUCOSE SERPL-MCNC: 151 MG/DL (ref 65–99)
HCT VFR BLD AUTO: 18.9 % (ref 42–52)
HGB BLD-MCNC: 6.3 G/DL (ref 14–18)
IMM GRANULOCYTES # BLD AUTO: 0.66 K/UL (ref 0–0.11)
IMM GRANULOCYTES NFR BLD AUTO: 3.8 % (ref 0–0.9)
INR PPP: 1.33 (ref 0.87–1.13)
LYMPHOCYTES # BLD AUTO: 0.9 K/UL (ref 1–4.8)
LYMPHOCYTES NFR BLD: 5.2 % (ref 22–41)
MCH RBC QN AUTO: 28.5 PG (ref 27–33)
MCHC RBC AUTO-ENTMCNC: 33.3 G/DL (ref 33.7–35.3)
MCV RBC AUTO: 85.5 FL (ref 81.4–97.8)
MONOCYTES # BLD AUTO: 0.95 K/UL (ref 0–0.85)
MONOCYTES NFR BLD AUTO: 5.5 % (ref 0–13.4)
NEUTROPHILS # BLD AUTO: 14.72 K/UL (ref 1.82–7.42)
NEUTROPHILS NFR BLD: 84.8 % (ref 44–72)
NRBC # BLD AUTO: 0 K/UL
NRBC BLD-RTO: 0 /100 WBC
PLATELET # BLD AUTO: 432 K/UL (ref 164–446)
PMV BLD AUTO: 10.8 FL (ref 9–12.9)
POTASSIUM SERPL-SCNC: 3.9 MMOL/L (ref 3.6–5.5)
PRODUCT TYPE UPROD: NORMAL
PROT SERPL-MCNC: 5.1 G/DL (ref 6–8.2)
PROTHROMBIN TIME: 16.8 SEC (ref 12–14.6)
RBC # BLD AUTO: 2.21 M/UL (ref 4.7–6.1)
RH BLD: NORMAL
SODIUM SERPL-SCNC: 138 MMOL/L (ref 135–145)
UNIT STATUS USTAT: NORMAL
WBC # BLD AUTO: 17.4 K/UL (ref 4.8–10.8)

## 2019-06-28 PROCEDURE — 85610 PROTHROMBIN TIME: CPT

## 2019-06-28 PROCEDURE — 85025 COMPLETE CBC W/AUTO DIFF WBC: CPT

## 2019-06-28 PROCEDURE — 80053 COMPREHEN METABOLIC PANEL: CPT

## 2019-06-28 PROCEDURE — 82962 GLUCOSE BLOOD TEST: CPT | Mod: 91

## 2019-06-28 PROCEDURE — 86850 RBC ANTIBODY SCREEN: CPT

## 2019-06-28 PROCEDURE — 700105 HCHG RX REV CODE 258: Performed by: SURGERY

## 2019-06-28 PROCEDURE — 770022 HCHG ROOM/CARE - ICU (200)

## 2019-06-28 PROCEDURE — 87070 CULTURE OTHR SPECIMN AEROBIC: CPT

## 2019-06-28 PROCEDURE — A9270 NON-COVERED ITEM OR SERVICE: HCPCS | Performed by: SURGERY

## 2019-06-28 PROCEDURE — 85730 THROMBOPLASTIN TIME PARTIAL: CPT

## 2019-06-28 PROCEDURE — 700102 HCHG RX REV CODE 250 W/ 637 OVERRIDE(OP): Performed by: SURGERY

## 2019-06-28 PROCEDURE — 700111 HCHG RX REV CODE 636 W/ 250 OVERRIDE (IP): Performed by: SURGERY

## 2019-06-28 PROCEDURE — 99153 MOD SED SAME PHYS/QHP EA: CPT

## 2019-06-28 PROCEDURE — 700111 HCHG RX REV CODE 636 W/ 250 OVERRIDE (IP)

## 2019-06-28 PROCEDURE — 700111 HCHG RX REV CODE 636 W/ 250 OVERRIDE (IP): Performed by: RADIOLOGY

## 2019-06-28 PROCEDURE — 99233 SBSQ HOSP IP/OBS HIGH 50: CPT | Performed by: SURGERY

## 2019-06-28 PROCEDURE — 36430 TRANSFUSION BLD/BLD COMPNT: CPT

## 2019-06-28 PROCEDURE — 86901 BLOOD TYPING SEROLOGIC RH(D): CPT

## 2019-06-28 PROCEDURE — 82247 BILIRUBIN TOTAL: CPT

## 2019-06-28 PROCEDURE — 86923 COMPATIBILITY TEST ELECTRIC: CPT

## 2019-06-28 PROCEDURE — 700112 HCHG RX REV CODE 229: Performed by: SURGERY

## 2019-06-28 PROCEDURE — 700101 HCHG RX REV CODE 250: Performed by: SURGERY

## 2019-06-28 PROCEDURE — 0W9G30Z DRAINAGE OF PERITONEAL CAVITY WITH DRAINAGE DEVICE, PERCUTANEOUS APPROACH: ICD-10-PCS | Performed by: RADIOLOGY

## 2019-06-28 PROCEDURE — 86900 BLOOD TYPING SEROLOGIC ABO: CPT

## 2019-06-28 PROCEDURE — 87205 SMEAR GRAM STAIN: CPT

## 2019-06-28 PROCEDURE — 71045 X-RAY EXAM CHEST 1 VIEW: CPT

## 2019-06-28 PROCEDURE — P9016 RBC LEUKOCYTES REDUCED: HCPCS

## 2019-06-28 RX ORDER — MIDAZOLAM HYDROCHLORIDE 1 MG/ML
INJECTION INTRAMUSCULAR; INTRAVENOUS
Status: COMPLETED
Start: 2019-06-28 | End: 2019-06-28

## 2019-06-28 RX ORDER — SODIUM CHLORIDE 9 MG/ML
500 INJECTION, SOLUTION INTRAVENOUS
Status: ACTIVE | OUTPATIENT
Start: 2019-06-28 | End: 2019-06-28

## 2019-06-28 RX ORDER — MIDAZOLAM HYDROCHLORIDE 1 MG/ML
.5-2 INJECTION INTRAMUSCULAR; INTRAVENOUS PRN
Status: ACTIVE | OUTPATIENT
Start: 2019-06-28 | End: 2019-06-28

## 2019-06-28 RX ORDER — ONDANSETRON 2 MG/ML
4 INJECTION INTRAMUSCULAR; INTRAVENOUS PRN
Status: ACTIVE | OUTPATIENT
Start: 2019-06-28 | End: 2019-06-28

## 2019-06-28 RX ORDER — NALOXONE HYDROCHLORIDE 0.4 MG/ML
INJECTION, SOLUTION INTRAMUSCULAR; INTRAVENOUS; SUBCUTANEOUS
Status: COMPLETED
Start: 2019-06-28 | End: 2019-06-28

## 2019-06-28 RX ADMIN — MIDAZOLAM 1 MG: 1 INJECTION INTRAMUSCULAR; INTRAVENOUS at 17:40

## 2019-06-28 RX ADMIN — MIDAZOLAM HYDROCHLORIDE 1 MG: 1 INJECTION, SOLUTION INTRAMUSCULAR; INTRAVENOUS at 17:44

## 2019-06-28 RX ADMIN — AMPICILLIN SODIUM AND SULBACTAM SODIUM 3 G: 2; 1 INJECTION, POWDER, FOR SOLUTION INTRAMUSCULAR; INTRAVENOUS at 05:00

## 2019-06-28 RX ADMIN — FENTANYL CITRATE 50 MCG: 50 INJECTION INTRAMUSCULAR; INTRAVENOUS at 17:36

## 2019-06-28 RX ADMIN — FENTANYL CITRATE 50 MCG: 50 INJECTION INTRAMUSCULAR; INTRAVENOUS at 17:40

## 2019-06-28 RX ADMIN — DOCUSATE SODIUM 100 MG: 100 CAPSULE, LIQUID FILLED ORAL at 05:01

## 2019-06-28 RX ADMIN — SENNOSIDES,DOCUSATE SODIUM 1 TABLET: 8.6; 5 TABLET, FILM COATED ORAL at 20:33

## 2019-06-28 RX ADMIN — METOCLOPRAMIDE 10 MG: 5 INJECTION, SOLUTION INTRAMUSCULAR; INTRAVENOUS at 05:01

## 2019-06-28 RX ADMIN — OXYCODONE HYDROCHLORIDE 5 MG: 5 TABLET ORAL at 07:48

## 2019-06-28 RX ADMIN — INDOMETHACIN 50 MG: 50 CAPSULE ORAL at 10:52

## 2019-06-28 RX ADMIN — MIDAZOLAM HYDROCHLORIDE 1 MG: 1 INJECTION, SOLUTION INTRAMUSCULAR; INTRAVENOUS at 17:36

## 2019-06-28 RX ADMIN — ENOXAPARIN SODIUM 30 MG: 100 INJECTION SUBCUTANEOUS at 18:35

## 2019-06-28 RX ADMIN — LINEZOLID 600 MG: 600 INJECTION, SOLUTION INTRAVENOUS at 05:00

## 2019-06-28 RX ADMIN — POTASSIUM CHLORIDE: 2 INJECTION, SOLUTION, CONCENTRATE INTRAVENOUS at 20:34

## 2019-06-28 RX ADMIN — METOCLOPRAMIDE 10 MG: 5 INJECTION, SOLUTION INTRAMUSCULAR; INTRAVENOUS at 18:36

## 2019-06-28 RX ADMIN — METOCLOPRAMIDE 10 MG: 5 INJECTION, SOLUTION INTRAMUSCULAR; INTRAVENOUS at 11:03

## 2019-06-28 RX ADMIN — MIDAZOLAM 1 MG: 1 INJECTION INTRAMUSCULAR; INTRAVENOUS at 17:44

## 2019-06-28 RX ADMIN — AMPICILLIN SODIUM AND SULBACTAM SODIUM 3 G: 2; 1 INJECTION, POWDER, FOR SOLUTION INTRAMUSCULAR; INTRAVENOUS at 11:02

## 2019-06-28 RX ADMIN — FENTANYL CITRATE 25 MCG: 50 INJECTION INTRAMUSCULAR; INTRAVENOUS at 17:44

## 2019-06-28 RX ADMIN — LINEZOLID 600 MG: 600 INJECTION, SOLUTION INTRAVENOUS at 18:32

## 2019-06-28 RX ADMIN — MIDAZOLAM 1 MG: 1 INJECTION INTRAMUSCULAR; INTRAVENOUS at 17:36

## 2019-06-28 RX ADMIN — INDOMETHACIN 50 MG: 50 CAPSULE ORAL at 18:37

## 2019-06-28 RX ADMIN — INDOMETHACIN 50 MG: 50 CAPSULE ORAL at 07:39

## 2019-06-28 RX ADMIN — AMPICILLIN SODIUM AND SULBACTAM SODIUM 3 G: 2; 1 INJECTION, POWDER, FOR SOLUTION INTRAMUSCULAR; INTRAVENOUS at 18:45

## 2019-06-28 RX ADMIN — DOCUSATE SODIUM 100 MG: 100 CAPSULE, LIQUID FILLED ORAL at 18:37

## 2019-06-28 ASSESSMENT — ENCOUNTER SYMPTOMS
ABDOMINAL PAIN: 0
ROS GI COMMENTS: DECREASED DISTENSION
SHORTNESS OF BREATH: 0
ABDOMINAL DISTENTION: 1

## 2019-06-28 ASSESSMENT — COPD QUESTIONNAIRES
COPD SCREENING SCORE: 2
DURING THE PAST 4 WEEKS HOW MUCH DID YOU FEEL SHORT OF BREATH: NONE/LITTLE OF THE TIME
DO YOU EVER COUGH UP ANY MUCUS OR PHLEGM?: NO/ONLY WITH OCCASIONAL COLDS OR INFECTIONS
HAVE YOU SMOKED AT LEAST 100 CIGARETTES IN YOUR ENTIRE LIFE: NO/DON'T KNOW

## 2019-06-28 ASSESSMENT — LIFESTYLE VARIABLES: EVER_SMOKED: NEVER

## 2019-06-28 NOTE — CARE PLAN
Problem: Infection  Goal: Will remain free from infection    Intervention: Implement standard precautions and perform hand washing before and after patient contact  Standard precautions in place. Hand hygiene performed before and after patient contact.       Problem: Bowel/Gastric:  Goal: Will not experience complications related to bowel motility  Abdominal assessment completed q4hr. Patient assisted up to commode. Bowel meds given per MAR.

## 2019-06-28 NOTE — PROGRESS NOTES
Site Marked and Procedure Confirmed with MD, patient, RT and RN pre procedure.  Peritoneal Drain Placement by MD Conner assisted by RT GLADYS Walker Abdominal access site.    Pt with Ativan allergy listed; after conversation with Pharmacist Aleyda, after reviewing pt's hx pt is OK to receive Versed during this procedure and medication is verified by pharmacist.    End Tidal CO2 range 22-28 during procedure.    6ccs peritoneal fluid collected by Dr. Conner, specimen sent to Microbiology for C&S and gram stain, delivered to lab by RT Aaron.    LLQ Abdominal  access site with drain in place, secured with 1 suture and percustay, covered with gauze and primapore, C/D/I.  Drain to gravity.    Patient tolerated procedure, hemodynamically stable; pt awake and talking post procedure; report given to FLAQUITO Nieves; patient transported to [facility] via IR RN monitored then transferred care to report RN.    Peritoneal Drain:  [employer], Flexima APDL Locking Pigtail Drainage Catheter System, 14f x 25cm REF# [id number], LOT# [id number], Exp. Date 2/12/2022.

## 2019-06-28 NOTE — CARE PLAN
Problem: Pain Management  Goal: Pain level will decrease to patient's comfort goal    Intervention: Follow pain managment plan developed in collaboration with patient and Interdisciplinary Team  Pharmacological and nonpharmacological methods used to control pain. Pain assessed Q2.  Medications administered as needed per the MAR.      Problem: Knowledge Deficit  Goal: Knowledge of disease process/condition, treatment plan, diagnostic tests, and medications will improve    Intervention: Explain information regarding disease process/condition, treatment plan, diagnostic tests, and medications and document in education  Patient and family updated on plan of care for the shift. All questions answered and needs met at this time.

## 2019-06-28 NOTE — PROGRESS NOTES
Pharmacy TPN Day # 8       2019    Dosing Weight   78 kg             TPN currently providing 75% of goal                                                  TPN goal: 2317-4608 kcal/day including 1.5 gm/kg/day Protein                                                  TPN indication: Prolonged NPO with bowel resection     Pertinent PMH: Admitted 19 for ileostomy takedown. Patient subsequently developed an ileus and was without oral intake for 7 days. Patient taken back to the OR on 19 and found to have colonic obstruction secondary to mesenteric hematoma and herniated loop of small bowel. Anastomotic takedown was completed and obstruction resolved. Expected prolonged NPO with ileus following second procedure, thus patient initiated on parenteral nutrition pending return of bowel function.    Temp (24hrs), Av.8 °C (98.2 °F), Min:36.2 °C (97.2 °F), Max:37.8 °C (100 °F)  .  Recent Labs      19   0416  19   0454  19   0509   SODIUM  133*  133*  138   POTASSIUM  3.7  3.9  3.9   CHLORIDE  104  103  108   CO2  22  20  21   BUN  13  14  18   CREATININE  0.67  0.67  0.62   GLUCOSE  148*  173*  151*   CALCIUM  7.7*  7.7*  7.7*   ASTSGOT  18  20  19   ALTSGPT  18  22  21   ALBUMIN  2.4*  2.6*  2.5*   TBILIRUBIN  5.1*  6.0*  5.6*   PHOSPHORUS   --   2.8   --    MAGNESIUM   --   1.9   --    PREALBUMIN  11.0*   --    --      Accu-Checks  Recent Labs      19   1025  19   1752  19   0206   POCGLUCOSE  143*  145*  135*       Vitals:    19 0400 19 0400 19 0400 19 1000   BP:    107/59   Weight: 88.9 kg (195 lb 15.8 oz) 88.5 kg (195 lb 1.7 oz) 87.1 kg (192 lb 0.3 oz)    Height:           Intake/Output Summary (Last 24 hours) at 19 1123  Last data filed at 19 1115   Gross per 24 hour   Intake             2500 ml   Output             1800 ml   Net              700 ml       Orders Placed This Encounter   Procedures   • Diet NPO     Standing Status:    Standing     Number of Occurrences:   8     Order Specific Question:   Restrict to:     Answer:   Sips with Medications [3]         TPN for past 72 hours (Show up to 3 orders; newest on the left. Changes between the two most recent orders are indicated.)     Start date and time   06/28/2019 2000 06/27/2019 2000 06/23/2019 2000      TPN Central Line Formulation [[id number]] TPN Central Line Formulation [[id number]] TPN Central Line Formulation [[id number]]    Order Status  Active Last Dose in Progress Completed    Last Given   06/27/2019 2025 06/26/2019 2001       Base    Clinisol 15%  80 g 100 g 120 g    dextrose 70%  150 g 200 g 340 g    fat emulsions 20%  20 g 20 g 55 g       Additives    potassium phosphates  20 mmol 20 mmol 20 mmol    potassium chloride  60 mEq 60 mEq 60 mEq    sodium acetate  150 mEq 150 mEq 150 mEq    sodium chloride  80 mEq 80 mEq 80 mEq    magnesium sulfate  12 mEq 12 mEq 12 mEq    calcium GLUConate  4.65 mEq 4.65 mEq 4.65 mEq    M.T.E. -5 Adult  1 mL 1 mL 1 mL    M.V.I. ADULT  10 mL 10 mL 10 mL    famotidine  40 mg 40 mg 40 mg       Energy Contribution    Proteins  -- -- --    Dextrose  -- -- --    Lipids  -- -- --    Total  0 kcal 0 kcal 0 kcal       Electrolyte Ion Calculated Amount    Sodium  -- -- --    Potassium  -- -- --    Calcium  -- -- --    Magnesium  -- -- --    Aluminum  -- -- --    Phosphate  -- -- --    Chloride  -- -- --    Acetate  -- -- --       Other    Total Protein  -- -- --    Total Protein/kg  -- -- --    Glucose Infusion Rate  -- -- --    Osmolarity  -- -- --    Volume  1,800 mL 1,800 mL 1,800 mL    Rate  75 mL/hr 75 mL/hr 75 mL/hr    Dosing Weight  87.1 kg 88.5 kg 93.1 kg    Infusion Site  Central Central Central            This formula provides:  % kcal as lipids = 19%  Grams protein/kg = 1 gm/kg  Non-protein calories = 710 kcal  Kcals/kg = 13 kcal/kg  Total daily calories = 1030 kcal    Comments:  TPN reduced to 50% of goal with anticipation of diet advancement in the  next couple days after drain placement.   No TPN complications noted.   No electrolyte adjustments.  Sodium improved with stopping LR. TPN LR equivalence.   Pepcid in TPN.   No insulin requirements. Maintain FSBG less than 180 mg/dL.       Senthil Stephens, PharmD

## 2019-06-28 NOTE — PROGRESS NOTES
Pt seen and examined  Has anterior abd abscess to be drained by ER  WBC down  Critical anemia continues - transfusing 1 unit PRBC  Anastamosis looks ok on CT  Stooling  Abd softer  Appreciate ICU assistance

## 2019-06-28 NOTE — PROGRESS NOTES
2 RN skin check complete with FLAQUITO Law.  Devices in place central line, EKG leads, BP cuff, pulse ox, PIV, SCDs, Prevena.  Skin assessed under the following devices central line, EKG leads, BP cuff, pulse ox, PIV, SCDs.  Preventative measures in place including Pillows and mepilex on sacrum and biatain on bilateral elbows.     Following areas of concern:   - Old ostomy site, approximated with staples, open to air  - Old JOHAN site on RLQ of abdomen with dressing in place, CDI  - Midline abdominal incision with prevena in place  - Blisters on abdomen, open to air  - Bilateral elbows, red but blanching, biatain in place bilaterally     The following interventions in place mepilex to sacrum, biatain to Bilateral elbows, pillows to support elbows and heels.     Wound consult placedYES/NO: N\A    Wound reported YES/NO: N\A  Appropriate LDAs opened YES/NO: N\A

## 2019-06-28 NOTE — PROGRESS NOTES
Trauma / Surgical Daily Progress Note    Date of Service  6/28/2019    Chief Complaint  59 y.o. male admitted 6/13/2019 for colostomy takedown. Required revision of anastomosis and evacuation of infected hematoma.    Interval Events  Hospital day #16  Pt currently requires ICU care  Seen on rounds and discussed with multidisciplinary team  Physiologic derangements preclude floor transfer  Events and interventions include:  Integration of multiple data points and associated complex medical decisions   Nutritional support-decreasing TPN,advancing diet slowly  Aggressive pulmonary toilet-remains at high risk for decompensation  Ongoing infusion of abx for infected hematoma  Pain control  IR drainage of abscess today    Review of Systems  Review of Systems   Respiratory: Negative for shortness of breath.    Gastrointestinal: Positive for abdominal distention. Negative for abdominal pain.        Decreased distension   All other systems reviewed and are negative.       Vital Signs for last 24 hours  Temp:  [36.2 °C (97.2 °F)-37 °C (98.6 °F)] 36.7 °C (98 °F)  Pulse:  [65-88] 74  Resp:  [12-28] 14  BP: (107)/(59) 107/59  SpO2:  [97 %-100 %] 100 %    Hemodynamic parameters for last 24 hours       Respiratory Data     Respiration: 14, Pulse Oximetry: 100 %, O2 Daily Delivery Respiratory : Room Air with O2 Available     Work Of Breathing / Effort: Mild  RUL Breath Sounds: Clear, RML Breath Sounds: Clear, RLL Breath Sounds: Diminished, BARBARA Breath Sounds: Clear, LLL Breath Sounds: Diminished    Physical Exam  Physical Exam   Constitutional: He is oriented to person, place, and time. He appears well-developed and well-nourished. No distress.   HENT:   Head: Normocephalic and atraumatic.   Eyes: Pupils are equal, round, and reactive to light. EOM are normal. Right eye exhibits no discharge. Left eye exhibits no discharge. Scleral icterus is present.   Neck: Normal range of motion. Neck supple. No JVD present. No tracheal deviation  present.   Cardiovascular: Normal rate, regular rhythm, normal heart sounds and intact distal pulses.    Pulmonary/Chest: Effort normal and breath sounds normal. No respiratory distress. He has no wheezes.   Abdominal: Soft. He exhibits distension. There is no tenderness.   Musculoskeletal: Normal range of motion. He exhibits edema.   Neurological: He is alert and oriented to person, place, and time. No cranial nerve deficit. Coordination normal.   Skin: Skin is warm and dry. No rash noted. No erythema. No pallor.   Psychiatric: He has a normal mood and affect. His behavior is normal.       Laboratory  Recent Results (from the past 24 hour(s))   ACCU-CHEK GLUCOSE    Collection Time: 06/27/19  5:52 PM   Result Value Ref Range    Glucose - Accu-Ck 145 (H) 65 - 99 mg/dL   ACCU-CHEK GLUCOSE    Collection Time: 06/28/19  2:06 AM   Result Value Ref Range    Glucose - Accu-Ck 135 (H) 65 - 99 mg/dL   Prothrombin Time    Collection Time: 06/28/19  5:09 AM   Result Value Ref Range    PT 16.8 (H) 12.0 - 14.6 sec    INR 1.33 (H) 0.87 - 1.13   APTT    Collection Time: 06/28/19  5:09 AM   Result Value Ref Range    APTT 34.6 24.7 - 36.0 sec   CBC WITH DIFFERENTIAL    Collection Time: 06/28/19  5:09 AM   Result Value Ref Range    WBC 17.4 (H) 4.8 - 10.8 K/uL    RBC 2.21 (L) 4.70 - 6.10 M/uL    Hemoglobin 6.3 (L) 14.0 - 18.0 g/dL    Hematocrit 18.9 (L) 42.0 - 52.0 %    MCV 85.5 81.4 - 97.8 fL    MCH 28.5 27.0 - 33.0 pg    MCHC 33.3 (L) 33.7 - 35.3 g/dL    RDW 49.9 35.9 - 50.0 fL    Platelet Count 432 164 - 446 K/uL    MPV 10.8 9.0 - 12.9 fL    Neutrophils-Polys 84.80 (H) 44.00 - 72.00 %    Lymphocytes 5.20 (L) 22.00 - 41.00 %    Monocytes 5.50 0.00 - 13.40 %    Eosinophils 0.60 0.00 - 6.90 %    Basophils 0.10 0.00 - 1.80 %    Immature Granulocytes 3.80 (H) 0.00 - 0.90 %    Nucleated RBC 0.00 /100 WBC    Neutrophils (Absolute) 14.72 (H) 1.82 - 7.42 K/uL    Lymphs (Absolute) 0.90 (L) 1.00 - 4.80 K/uL    Monos (Absolute) 0.95 (H) 0.00  - 0.85 K/uL    Eos (Absolute) 0.11 0.00 - 0.51 K/uL    Baso (Absolute) 0.02 0.00 - 0.12 K/uL    Immature Granulocytes (abs) 0.66 (H) 0.00 - 0.11 K/uL    NRBC (Absolute) 0.00 K/uL   Comp Metabolic Panel    Collection Time: 06/28/19  5:09 AM   Result Value Ref Range    Sodium 138 135 - 145 mmol/L    Potassium 3.9 3.6 - 5.5 mmol/L    Chloride 108 96 - 112 mmol/L    Co2 21 20 - 33 mmol/L    Anion Gap 9.0 0.0 - 11.9    Glucose 151 (H) 65 - 99 mg/dL    Bun 18 8 - 22 mg/dL    Creatinine 0.62 0.50 - 1.40 mg/dL    Calcium 7.7 (L) 8.5 - 10.5 mg/dL    AST(SGOT) 19 12 - 45 U/L    ALT(SGPT) 21 2 - 50 U/L    Alkaline Phosphatase 213 (H) 30 - 99 U/L    Total Bilirubin 5.6 (H) 0.1 - 1.5 mg/dL    Albumin 2.5 (L) 3.2 - 4.9 g/dL    Total Protein 5.1 (L) 6.0 - 8.2 g/dL    Globulin 2.6 1.9 - 3.5 g/dL    A-G Ratio 1.0 g/dL   ESTIMATED GFR    Collection Time: 06/28/19  5:09 AM   Result Value Ref Range    GFR If African American >60 >60 mL/min/1.73 m 2    GFR If Non African American >60 >60 mL/min/1.73 m 2   COD - Adult (Type and Screen)    Collection Time: 06/28/19  5:09 AM   Result Value Ref Range    ABO Grouping Only O     Rh Grouping Only POS     Antibody Screen-Cod NEG     Component R       R3                  Red Blood Cells3    [id number]   issued       06/28/19   09:37      Product Type Red Blood Cells LR Pheresis     Dispense Status issued     Unit Number (Barcoded) [id number]     Product Code (Barcoded) [id number]     Blood Type (Barcoded) 5100        Fluids    Intake/Output Summary (Last 24 hours) at 06/28/19 1408  Last data filed at 06/28/19 1200   Gross per 24 hour   Intake             2160 ml   Output             1000 ml   Net             1160 ml       Core Measures & Quality Metrics  Labs reviewed and Radiology images reviewed  Flor catheter: No Flor  Central line in place: TPN    DVT Prophylaxis: Enoxaparin (Lovenox)  DVT prophylaxis - mechanical: SCDs  Ulcer prophylaxis: Yes  Antibiotics: Treating active  infection/contamination beyond 24 hours perioperative coverage      YADIEL Score    ETOH Screening      Assessment/Plan  Malnutrition (HCC)   Assessment & Plan    6/26 tpn continues-slowly starting po  6/28 cutting tpn in 1/2 as po increases     Mesenteric hematoma   Assessment & Plan    Hematoma evacuated.  Cultures are polymicrobial positive.    Zosyn intially.    6/28:   Xyvox and Unasyn:  abx day #5       Colostomy in place (HCC)- (present on admission)   Assessment & Plan    6/13/19  Resection and closure of colostomy  6/19 having BM's  6/20  Explored for bowel obstruction and infected hematoma.  Anastomosis revised.    6/24  improved distention, post operative ileus resolving, flatus  6/25 clears initiated  6/27 advancing diet slowly  6/28 clinically improving bowel function-full liquids     Blood loss anemia   Assessment & Plan    Transfused one unit 6/20 6/22:  1 prbc  6/28 transfused 1 prbc  follow       Lung infiltrate   Assessment & Plan    6/19  Lung infiltrate post op.  Not on vent.  No sputum for culture.  Currently on antibiotics for intraabdominal infection     Respiratory failure following trauma and surgery (HCC)   Assessment & Plan    Extubated 6/21  Tolerating well         Discussed patient condition with RN, RT, Pharmacy, Dietary,  and Patient.

## 2019-06-28 NOTE — PROGRESS NOTES
2 RN skin check complete with FLAQUITO Tiwari.     Devices in place:     - cardiac leads  - BP cuff  - finger probe  - SCD's     Skin assessed under the aforementioned devices.      Preventative measures in place including:  - pt turned q2hrs with pillows for offloading,  - pillows under bony prominences  - devices repositioned regularly     Following areas of concern:     - Redness/scab noted on inner left nare  - Old ostomy site, approximated with staples, INES  - Old JOHAN site on RLQ of abdomen with dressing in place, CDI  - Midline abdominal incision with prevena in place  - Blisters on abdomen, open to air  - Bilateral elbows, red but blanching, biatain in place bilaterally      Appropriate wound LDA's in place.

## 2019-06-28 NOTE — DIETARY
Nutrition support weekly update:  Day 15 of admit.  60 yo male admitted with diverticulitis.  TPN initiated on 6/21. Current TPN providing 100% of nutritional needs.    Assessment:  Weight today 87.1 kg is increased 4.9 kg from admitting weight of 82.2 kg.     Evaluation:   1. Pt with colostomy closure on 6/13 followed by exploratory celiotomy, re-do colo-colectomy on 6/20.   2. Post op ileius and TPN started on 6/21  3. Pt was started on clear liquid diet 6/26 and had been choosing not to eat, boost breeze supplements (clear liquid) were added TID with meals.  4. NPO at midnight last night. Pt remains NPO.  5. [facility] notes he will decrease TPN provision to 50% in anticipation of diet advancement.  MD also notes will advance diet slowly    Malnutrition risk: na    Recommendations/Plan:  1. TPN per [facility] to meet estimated nutritional needs  2. If pt us unable to take adequate po consider transition to tube feeding and wean TPN.

## 2019-06-29 ENCOUNTER — APPOINTMENT (OUTPATIENT)
Dept: RADIOLOGY | Facility: MEDICAL CENTER | Age: 59
DRG: 329 | End: 2019-06-29
Attending: SURGERY
Payer: MEDICAID

## 2019-06-29 PROBLEM — K65.1 POSTPROCEDURAL INTRAABDOMINAL ABSCESS (HCC): Status: ACTIVE | Noted: 2019-06-21

## 2019-06-29 PROBLEM — T81.43XA POSTPROCEDURAL INTRAABDOMINAL ABSCESS: Status: ACTIVE | Noted: 2019-06-21

## 2019-06-29 PROBLEM — R91.8 LUNG INFILTRATE: Status: RESOLVED | Noted: 2019-06-22 | Resolved: 2019-06-29

## 2019-06-29 LAB
ALBUMIN SERPL BCP-MCNC: 2.3 G/DL (ref 3.2–4.9)
ALBUMIN/GLOB SERPL: 0.9 G/DL
ALP SERPL-CCNC: 185 U/L (ref 30–99)
ALT SERPL-CCNC: 16 U/L (ref 2–50)
ANION GAP SERPL CALC-SCNC: 10 MMOL/L (ref 0–11.9)
AST SERPL-CCNC: 12 U/L (ref 12–45)
BASOPHILS # BLD AUTO: 0.2 % (ref 0–1.8)
BASOPHILS # BLD: 0.03 K/UL (ref 0–0.12)
BILIRUB FLD-MCNC: 7.1 MG/DL
BILIRUB SERPL-MCNC: 5.1 MG/DL (ref 0.1–1.5)
BODY FLD TYPE: NORMAL
BUN SERPL-MCNC: 20 MG/DL (ref 8–22)
CALCIUM SERPL-MCNC: 7.7 MG/DL (ref 8.5–10.5)
CHLORIDE SERPL-SCNC: 110 MMOL/L (ref 96–112)
CO2 SERPL-SCNC: 21 MMOL/L (ref 20–33)
CREAT SERPL-MCNC: 0.6 MG/DL (ref 0.5–1.4)
EOSINOPHIL # BLD AUTO: 0.12 K/UL (ref 0–0.51)
EOSINOPHIL NFR BLD: 0.9 % (ref 0–6.9)
ERYTHROCYTE [DISTWIDTH] IN BLOOD BY AUTOMATED COUNT: 50.5 FL (ref 35.9–50)
GLOBULIN SER CALC-MCNC: 2.6 G/DL (ref 1.9–3.5)
GLUCOSE BLD-MCNC: 116 MG/DL (ref 65–99)
GLUCOSE SERPL-MCNC: 130 MG/DL (ref 65–99)
HCT VFR BLD AUTO: 21.2 % (ref 42–52)
HGB BLD-MCNC: 6.6 G/DL (ref 14–18)
IMM GRANULOCYTES # BLD AUTO: 0.3 K/UL (ref 0–0.11)
IMM GRANULOCYTES NFR BLD AUTO: 2.3 % (ref 0–0.9)
LYMPHOCYTES # BLD AUTO: 1.18 K/UL (ref 1–4.8)
LYMPHOCYTES NFR BLD: 9.2 % (ref 22–41)
MCH RBC QN AUTO: 27.2 PG (ref 27–33)
MCHC RBC AUTO-ENTMCNC: 31.1 G/DL (ref 33.7–35.3)
MCV RBC AUTO: 87.2 FL (ref 81.4–97.8)
MONOCYTES # BLD AUTO: 0.71 K/UL (ref 0–0.85)
MONOCYTES NFR BLD AUTO: 5.5 % (ref 0–13.4)
NEUTROPHILS # BLD AUTO: 10.48 K/UL (ref 1.82–7.42)
NEUTROPHILS NFR BLD: 81.9 % (ref 44–72)
NRBC # BLD AUTO: 0 K/UL
NRBC BLD-RTO: 0 /100 WBC
PLATELET # BLD AUTO: 469 K/UL (ref 164–446)
PMV BLD AUTO: 10.3 FL (ref 9–12.9)
POTASSIUM SERPL-SCNC: 4.2 MMOL/L (ref 3.6–5.5)
PROT SERPL-MCNC: 4.9 G/DL (ref 6–8.2)
RBC # BLD AUTO: 2.43 M/UL (ref 4.7–6.1)
SODIUM SERPL-SCNC: 141 MMOL/L (ref 135–145)
WBC # BLD AUTO: 12.8 K/UL (ref 4.8–10.8)

## 2019-06-29 PROCEDURE — 700102 HCHG RX REV CODE 250 W/ 637 OVERRIDE(OP): Performed by: SURGERY

## 2019-06-29 PROCEDURE — 700111 HCHG RX REV CODE 636 W/ 250 OVERRIDE (IP): Performed by: SURGERY

## 2019-06-29 PROCEDURE — 82962 GLUCOSE BLOOD TEST: CPT

## 2019-06-29 PROCEDURE — 99233 SBSQ HOSP IP/OBS HIGH 50: CPT | Performed by: SURGERY

## 2019-06-29 PROCEDURE — 80053 COMPREHEN METABOLIC PANEL: CPT

## 2019-06-29 PROCEDURE — A9270 NON-COVERED ITEM OR SERVICE: HCPCS | Performed by: SURGERY

## 2019-06-29 PROCEDURE — 85025 COMPLETE CBC W/AUTO DIFF WBC: CPT

## 2019-06-29 PROCEDURE — 700112 HCHG RX REV CODE 229: Performed by: SURGERY

## 2019-06-29 PROCEDURE — 770022 HCHG ROOM/CARE - ICU (200)

## 2019-06-29 PROCEDURE — 700105 HCHG RX REV CODE 258: Performed by: SURGERY

## 2019-06-29 RX ADMIN — AMPICILLIN SODIUM AND SULBACTAM SODIUM 3 G: 2; 1 INJECTION, POWDER, FOR SOLUTION INTRAMUSCULAR; INTRAVENOUS at 05:43

## 2019-06-29 RX ADMIN — ENOXAPARIN SODIUM 30 MG: 100 INJECTION SUBCUTANEOUS at 05:44

## 2019-06-29 RX ADMIN — METOCLOPRAMIDE 10 MG: 5 INJECTION, SOLUTION INTRAMUSCULAR; INTRAVENOUS at 18:00

## 2019-06-29 RX ADMIN — AMPICILLIN SODIUM AND SULBACTAM SODIUM 3 G: 2; 1 INJECTION, POWDER, FOR SOLUTION INTRAMUSCULAR; INTRAVENOUS at 13:00

## 2019-06-29 RX ADMIN — DOCUSATE SODIUM 100 MG: 100 CAPSULE, LIQUID FILLED ORAL at 17:38

## 2019-06-29 RX ADMIN — METOCLOPRAMIDE 10 MG: 5 INJECTION, SOLUTION INTRAMUSCULAR; INTRAVENOUS at 00:06

## 2019-06-29 RX ADMIN — ENOXAPARIN SODIUM 30 MG: 100 INJECTION SUBCUTANEOUS at 17:40

## 2019-06-29 RX ADMIN — METOCLOPRAMIDE 10 MG: 5 INJECTION, SOLUTION INTRAMUSCULAR; INTRAVENOUS at 13:00

## 2019-06-29 RX ADMIN — INDOMETHACIN 50 MG: 50 CAPSULE ORAL at 13:00

## 2019-06-29 RX ADMIN — METOCLOPRAMIDE 10 MG: 5 INJECTION, SOLUTION INTRAMUSCULAR; INTRAVENOUS at 05:44

## 2019-06-29 RX ADMIN — INDOMETHACIN 50 MG: 50 CAPSULE ORAL at 09:00

## 2019-06-29 RX ADMIN — AMPICILLIN SODIUM AND SULBACTAM SODIUM 3 G: 2; 1 INJECTION, POWDER, FOR SOLUTION INTRAMUSCULAR; INTRAVENOUS at 17:37

## 2019-06-29 RX ADMIN — DOCUSATE SODIUM 100 MG: 100 CAPSULE, LIQUID FILLED ORAL at 05:44

## 2019-06-29 RX ADMIN — ACETAMINOPHEN 650 MG: 325 TABLET, FILM COATED ORAL at 17:34

## 2019-06-29 RX ADMIN — LINEZOLID 600 MG: 600 INJECTION, SOLUTION INTRAVENOUS at 05:44

## 2019-06-29 RX ADMIN — INDOMETHACIN 50 MG: 50 CAPSULE ORAL at 17:37

## 2019-06-29 RX ADMIN — LINEZOLID 600 MG: 600 INJECTION, SOLUTION INTRAVENOUS at 17:46

## 2019-06-29 RX ADMIN — AMPICILLIN SODIUM AND SULBACTAM SODIUM 3 G: 2; 1 INJECTION, POWDER, FOR SOLUTION INTRAMUSCULAR; INTRAVENOUS at 23:57

## 2019-06-29 RX ADMIN — AMPICILLIN SODIUM AND SULBACTAM SODIUM 3 G: 2; 1 INJECTION, POWDER, FOR SOLUTION INTRAMUSCULAR; INTRAVENOUS at 00:06

## 2019-06-29 RX ADMIN — METOCLOPRAMIDE 10 MG: 5 INJECTION, SOLUTION INTRAMUSCULAR; INTRAVENOUS at 23:57

## 2019-06-29 RX ADMIN — OXYCODONE HYDROCHLORIDE 5 MG: 5 TABLET ORAL at 06:32

## 2019-06-29 NOTE — OR SURGEON
Immediate Post- Operative Note        PostOp Diagnosis: abdominal fluid collection      Procedure(s): CT drain      Estimated Blood Loss: Less than 5 ml        Complications: None            6/28/2019     5:57 PM     Tomas Conner

## 2019-06-29 NOTE — PROGRESS NOTES
This RN called lab to check on pt's peritoneal culture. Lab stated that they have received it, it has been plated, and it is in process.

## 2019-06-29 NOTE — PROGRESS NOTES
2 RN skin check completed with FLAQUITO Johnson    Devices in place:     - cardiac leads  - central line  - BP cuff  - finger probe  - SCD's     Skin assessed under the aforementioned devices.      Preventative measures in place including:  - mepilex on sacrum   - pillows under bony prominences  - devices repositioned regularly     Following areas of concern:      - Redness/scab noted on inner left nare  - Old ostomy site, approximated with staples, INES  - Old JOHAN site on RLQ of abdomen with dressing in place, CDI  - Midline abdominal incision with prevena in place  - Blisters on abdomen, open to air  - Bilateral elbows, red but blanching, floating on pillows     Appropriate wound LDA's in place.

## 2019-06-29 NOTE — PROGRESS NOTES
Trauma / Surgical Daily Progress Note    Date of Service  6/29/2019    Chief Complaint  59 y.o. male admitted 6/13/2019 with DIVERTICULITIS     Interval Events  Percutaneous drain placement of intra-abdominal abscess.    Review of Systems  Review of Systems   Unable to perform ROS: Acuity of condition        Vital Signs for last 24 hours  Temp:  [36.7 °C (98 °F)-37.2 °C (98.9 °F)] 37.2 °C (98.9 °F)  Pulse:  [65-85] 72  Resp:  [16-36] 19  SpO2:  [97 %-100 %] 98 %    Hemodynamic parameters for last 24 hours       Respiratory Data     Respiration: 19, Pulse Oximetry: 98 %, O2 Daily Delivery Respiratory : Silicone Nasal Cannula     Work Of Breathing / Effort: Mild  RUL Breath Sounds: Clear, RML Breath Sounds: Clear, RLL Breath Sounds: Diminished, BARBARA Breath Sounds: Clear, LLL Breath Sounds: Diminished    Physical Exam  Physical Exam   Constitutional: He is oriented to person, place, and time. He appears well-developed and well-nourished. He is cooperative. No distress.   HENT:   Head: Normocephalic and atraumatic.   Eyes: Pupils are equal, round, and reactive to light. EOM are normal. Right eye exhibits no discharge. Left eye exhibits no discharge. Scleral icterus is present.   Neck: Normal range of motion. Neck supple. No JVD present. No tracheal deviation present.   Cardiovascular: Normal rate, regular rhythm, normal heart sounds and intact distal pulses.    Pulmonary/Chest: Effort normal and breath sounds normal. No respiratory distress. He has no wheezes.   Abdominal: Soft. He exhibits distension. There is no tenderness.   Midline incision is healing well. Clean and dry.  Percutaneous drain output is mildly bilious.   Musculoskeletal: Normal range of motion. He exhibits edema.   Neurological: He is alert and oriented to person, place, and time. No cranial nerve deficit. Coordination normal.   Skin: Skin is warm and dry. No rash noted. No erythema. No pallor.   Psychiatric: He has a normal mood and affect. His speech  is normal and behavior is normal. He expresses impulsivity. He exhibits abnormal recent memory.   Nursing note and vitals reviewed.      Laboratory  Recent Results (from the past 24 hour(s))   FLUID CULTURE W/GRAM STAIN    Collection Time: 06/28/19  5:48 PM   Result Value Ref Range    Significant Indicator NEG     Source BF     Site Peritoneal Fluid     Culture Result No growth at 24 hours.     Gram Stain Result -    ACCU-CHEK GLUCOSE    Collection Time: 06/28/19  7:41 PM   Result Value Ref Range    Glucose - Accu-Ck 133 (H) 65 - 99 mg/dL   CBC WITH DIFFERENTIAL    Collection Time: 06/29/19  3:15 AM   Result Value Ref Range    WBC 12.8 (H) 4.8 - 10.8 K/uL    RBC 2.43 (L) 4.70 - 6.10 M/uL    Hemoglobin 6.6 (L) 14.0 - 18.0 g/dL    Hematocrit 21.2 (L) 42.0 - 52.0 %    MCV 87.2 81.4 - 97.8 fL    MCH 27.2 27.0 - 33.0 pg    MCHC 31.1 (L) 33.7 - 35.3 g/dL    RDW 50.5 (H) 35.9 - 50.0 fL    Platelet Count 469 (H) 164 - 446 K/uL    MPV 10.3 9.0 - 12.9 fL    Neutrophils-Polys 81.90 (H) 44.00 - 72.00 %    Lymphocytes 9.20 (L) 22.00 - 41.00 %    Monocytes 5.50 0.00 - 13.40 %    Eosinophils 0.90 0.00 - 6.90 %    Basophils 0.20 0.00 - 1.80 %    Immature Granulocytes 2.30 (H) 0.00 - 0.90 %    Nucleated RBC 0.00 /100 WBC    Neutrophils (Absolute) 10.48 (H) 1.82 - 7.42 K/uL    Lymphs (Absolute) 1.18 1.00 - 4.80 K/uL    Monos (Absolute) 0.71 0.00 - 0.85 K/uL    Eos (Absolute) 0.12 0.00 - 0.51 K/uL    Baso (Absolute) 0.03 0.00 - 0.12 K/uL    Immature Granulocytes (abs) 0.30 (H) 0.00 - 0.11 K/uL    NRBC (Absolute) 0.00 K/uL   Comp Metabolic Panel    Collection Time: 06/29/19  3:15 AM   Result Value Ref Range    Sodium 141 135 - 145 mmol/L    Potassium 4.2 3.6 - 5.5 mmol/L    Chloride 110 96 - 112 mmol/L    Co2 21 20 - 33 mmol/L    Anion Gap 10.0 0.0 - 11.9    Glucose 130 (H) 65 - 99 mg/dL    Bun 20 8 - 22 mg/dL    Creatinine 0.60 0.50 - 1.40 mg/dL    Calcium 7.7 (L) 8.5 - 10.5 mg/dL    AST(SGOT) 12 12 - 45 U/L    ALT(SGPT) 16 2 - 50 U/L     Alkaline Phosphatase 185 (H) 30 - 99 U/L    Total Bilirubin 5.1 (H) 0.1 - 1.5 mg/dL    Albumin 2.3 (L) 3.2 - 4.9 g/dL    Total Protein 4.9 (L) 6.0 - 8.2 g/dL    Globulin 2.6 1.9 - 3.5 g/dL    A-G Ratio 0.9 g/dL   ESTIMATED GFR    Collection Time: 06/29/19  3:15 AM   Result Value Ref Range    GFR If African American >60 >60 mL/min/1.73 m 2    GFR If Non African American >60 >60 mL/min/1.73 m 2   ACCU-CHEK GLUCOSE    Collection Time: 06/29/19  5:53 AM   Result Value Ref Range    Glucose - Accu-Ck 116 (H) 65 - 99 mg/dL       Fluids    Intake/Output Summary (Last 24 hours) at 06/29/19 1647  Last data filed at 06/29/19 1200   Gross per 24 hour   Intake              660 ml   Output              776 ml   Net             -116 ml       Core Measures & Quality Metrics  Labs reviewed and Radiology images reviewed  Flor catheter: No Flor  Central line in place: TPN    DVT Prophylaxis: Enoxaparin (Lovenox)  DVT prophylaxis - mechanical: SCDs  Ulcer prophylaxis: Yes  Antibiotics: Treating active infection/contamination beyond 24 hours perioperative coverage      YADIEL Score  ETOH Screening    Assessment/Plan  Malnutrition (HCC)   Assessment & Plan    Required total parenteral nutrition in the postoperative setting.   6/28 Tapering of TPN initiated.  6/29 TPN tapered off.  Encourage oral alimentation.     Blood loss anemia   Assessment & Plan    Persistent critical anemia.  6/20 Transfused one unit of packed red blood cells.   6/22 Transfused one unit of packed red blood cells.  6/28 Transfused one unit of packed red blood cells.  Continue to trend closely. Transfuse 1 unit PRBC's for hemoglobin less than 7.     Postprocedural intraabdominal abscess   Assessment & Plan    6/27 CT imaging demonstrated a large rim-enhancing fluid collection in the anterior abdomen which measures approximately 22.8 x 3.9 x 16 cm consistent with abscess.  6/28 CT guided percutaneous drain placement.  6/29 Xyvox and Unasyn day 6.       Respiratory  failure following trauma and surgery (HCC)   Assessment & Plan    Mechanical ventilatory support following initial surgeries.  6/21 Extubated. Continue aggressive pulmonary care and hygiene.     Colostomy in place ([facility])- (present on admission)   Assessment & Plan    6/13/19  Resection and closure of colostomy  6/19 having BM's  6/20  Explored for bowel obstruction and infected hematoma.  Anastomosis revised.    6/24  improved distention, post operative ileus resolving, flatus  6/25 clears initiated  6/27 advancing diet slowly  6/28 clinically improving bowel function-full liquids         Discussed patient condition with Family, RN, RT, Pharmacy, Patient and general surgery.  CRITICAL CARE TIME EXCLUDING PROCEDURES: 35  minutes

## 2019-06-29 NOTE — PROGRESS NOTES
[id number] Pt on monitor in wheelchair with RN out to the healing garden. Pt tolerated well; VSS throughout.

## 2019-06-30 ENCOUNTER — APPOINTMENT (OUTPATIENT)
Dept: RADIOLOGY | Facility: MEDICAL CENTER | Age: 59
DRG: 329 | End: 2019-06-30
Attending: SURGERY
Payer: MEDICAID

## 2019-06-30 LAB
ALBUMIN SERPL BCP-MCNC: 2.4 G/DL (ref 3.2–4.9)
ALBUMIN/GLOB SERPL: 0.8 G/DL
ALP SERPL-CCNC: 282 U/L (ref 30–99)
ALT SERPL-CCNC: 18 U/L (ref 2–50)
ANION GAP SERPL CALC-SCNC: 10 MMOL/L (ref 0–11.9)
AST SERPL-CCNC: 17 U/L (ref 12–45)
BASOPHILS # BLD AUTO: 0.2 % (ref 0–1.8)
BASOPHILS # BLD: 0.02 K/UL (ref 0–0.12)
BILIRUB SERPL-MCNC: 4.4 MG/DL (ref 0.1–1.5)
BUN SERPL-MCNC: 15 MG/DL (ref 8–22)
CALCIUM SERPL-MCNC: 7.9 MG/DL (ref 8.5–10.5)
CHLORIDE SERPL-SCNC: 109 MMOL/L (ref 96–112)
CO2 SERPL-SCNC: 19 MMOL/L (ref 20–33)
CREAT SERPL-MCNC: 0.63 MG/DL (ref 0.5–1.4)
EOSINOPHIL # BLD AUTO: 0.12 K/UL (ref 0–0.51)
EOSINOPHIL NFR BLD: 1 % (ref 0–6.9)
ERYTHROCYTE [DISTWIDTH] IN BLOOD BY AUTOMATED COUNT: 52.4 FL (ref 35.9–50)
GLOBULIN SER CALC-MCNC: 3 G/DL (ref 1.9–3.5)
GLUCOSE SERPL-MCNC: 105 MG/DL (ref 65–99)
GRAM STN SPEC: NORMAL
HCT VFR BLD AUTO: 22.3 % (ref 42–52)
HGB BLD-MCNC: 7.1 G/DL (ref 14–18)
IMM GRANULOCYTES # BLD AUTO: 0.27 K/UL (ref 0–0.11)
IMM GRANULOCYTES NFR BLD AUTO: 2.2 % (ref 0–0.9)
LYMPHOCYTES # BLD AUTO: 1.33 K/UL (ref 1–4.8)
LYMPHOCYTES NFR BLD: 11 % (ref 22–41)
MCH RBC QN AUTO: 28.2 PG (ref 27–33)
MCHC RBC AUTO-ENTMCNC: 31.8 G/DL (ref 33.7–35.3)
MCV RBC AUTO: 88.5 FL (ref 81.4–97.8)
MONOCYTES # BLD AUTO: 0.67 K/UL (ref 0–0.85)
MONOCYTES NFR BLD AUTO: 5.5 % (ref 0–13.4)
NEUTROPHILS # BLD AUTO: 9.67 K/UL (ref 1.82–7.42)
NEUTROPHILS NFR BLD: 80.1 % (ref 44–72)
NRBC # BLD AUTO: 0 K/UL
NRBC BLD-RTO: 0 /100 WBC
PLATELET # BLD AUTO: 587 K/UL (ref 164–446)
PMV BLD AUTO: 10.2 FL (ref 9–12.9)
POTASSIUM SERPL-SCNC: 4 MMOL/L (ref 3.6–5.5)
PROT SERPL-MCNC: 5.4 G/DL (ref 6–8.2)
RBC # BLD AUTO: 2.52 M/UL (ref 4.7–6.1)
SIGNIFICANT IND 70042: NORMAL
SITE SITE: NORMAL
SODIUM SERPL-SCNC: 138 MMOL/L (ref 135–145)
SOURCE SOURCE: NORMAL
WBC # BLD AUTO: 12.1 K/UL (ref 4.8–10.8)

## 2019-06-30 PROCEDURE — 85025 COMPLETE CBC W/AUTO DIFF WBC: CPT

## 2019-06-30 PROCEDURE — 700111 HCHG RX REV CODE 636 W/ 250 OVERRIDE (IP): Performed by: SURGERY

## 2019-06-30 PROCEDURE — 99233 SBSQ HOSP IP/OBS HIGH 50: CPT | Performed by: SURGERY

## 2019-06-30 PROCEDURE — 80053 COMPREHEN METABOLIC PANEL: CPT

## 2019-06-30 PROCEDURE — 700105 HCHG RX REV CODE 258: Performed by: SURGERY

## 2019-06-30 PROCEDURE — A9270 NON-COVERED ITEM OR SERVICE: HCPCS | Performed by: SURGERY

## 2019-06-30 PROCEDURE — 700102 HCHG RX REV CODE 250 W/ 637 OVERRIDE(OP): Performed by: SURGERY

## 2019-06-30 PROCEDURE — 770001 HCHG ROOM/CARE - MED/SURG/GYN PRIV*

## 2019-06-30 PROCEDURE — 700112 HCHG RX REV CODE 229: Performed by: SURGERY

## 2019-06-30 RX ORDER — SODIUM CHLORIDE 9 MG/ML
INJECTION, SOLUTION INTRAVENOUS
Status: ACTIVE
Start: 2019-06-30 | End: 2019-07-01

## 2019-06-30 RX ADMIN — METOCLOPRAMIDE 10 MG: 5 INJECTION, SOLUTION INTRAMUSCULAR; INTRAVENOUS at 05:13

## 2019-06-30 RX ADMIN — METOCLOPRAMIDE 10 MG: 5 INJECTION, SOLUTION INTRAMUSCULAR; INTRAVENOUS at 11:51

## 2019-06-30 RX ADMIN — METOCLOPRAMIDE 10 MG: 5 INJECTION, SOLUTION INTRAMUSCULAR; INTRAVENOUS at 17:06

## 2019-06-30 RX ADMIN — AMPICILLIN SODIUM AND SULBACTAM SODIUM 3 G: 2; 1 INJECTION, POWDER, FOR SOLUTION INTRAMUSCULAR; INTRAVENOUS at 05:13

## 2019-06-30 RX ADMIN — LINEZOLID 600 MG: 600 INJECTION, SOLUTION INTRAVENOUS at 17:07

## 2019-06-30 RX ADMIN — AMPICILLIN SODIUM AND SULBACTAM SODIUM 3 G: 2; 1 INJECTION, POWDER, FOR SOLUTION INTRAMUSCULAR; INTRAVENOUS at 23:30

## 2019-06-30 RX ADMIN — INDOMETHACIN 50 MG: 50 CAPSULE ORAL at 11:51

## 2019-06-30 RX ADMIN — LINEZOLID 600 MG: 600 INJECTION, SOLUTION INTRAVENOUS at 05:13

## 2019-06-30 RX ADMIN — AMPICILLIN SODIUM AND SULBACTAM SODIUM 3 G: 2; 1 INJECTION, POWDER, FOR SOLUTION INTRAMUSCULAR; INTRAVENOUS at 11:51

## 2019-06-30 RX ADMIN — INDOMETHACIN 50 MG: 50 CAPSULE ORAL at 17:06

## 2019-06-30 RX ADMIN — METOCLOPRAMIDE 10 MG: 5 INJECTION, SOLUTION INTRAMUSCULAR; INTRAVENOUS at 23:30

## 2019-06-30 RX ADMIN — ENOXAPARIN SODIUM 30 MG: 100 INJECTION SUBCUTANEOUS at 17:06

## 2019-06-30 RX ADMIN — DOCUSATE SODIUM 100 MG: 100 CAPSULE, LIQUID FILLED ORAL at 17:06

## 2019-06-30 RX ADMIN — DOCUSATE SODIUM 100 MG: 100 CAPSULE, LIQUID FILLED ORAL at 05:13

## 2019-06-30 RX ADMIN — INDOMETHACIN 50 MG: 50 CAPSULE ORAL at 08:44

## 2019-06-30 RX ADMIN — AMPICILLIN SODIUM AND SULBACTAM SODIUM 3 G: 2; 1 INJECTION, POWDER, FOR SOLUTION INTRAMUSCULAR; INTRAVENOUS at 17:07

## 2019-06-30 RX ADMIN — OXYCODONE HYDROCHLORIDE 5 MG: 5 TABLET ORAL at 21:05

## 2019-06-30 RX ADMIN — ENOXAPARIN SODIUM 30 MG: 100 INJECTION SUBCUTANEOUS at 05:13

## 2019-06-30 ASSESSMENT — ENCOUNTER SYMPTOMS
ABDOMINAL DISTENTION: 0
DYSPHORIC MOOD: 1
VOMITING: 0
NAUSEA: 0

## 2019-06-30 NOTE — PROGRESS NOTES
Trauma / Surgical Daily Progress Note    Date of Service  6/30/2019    Chief Complaint  59 y.o. male admitted 6/13/2019 with DIVERTICULITIS     Interval Events  Decreasing pigtail drain output.  Transfer to [facility].    Review of Systems  Review of Systems   Gastrointestinal: Negative for abdominal distention, nausea and vomiting.   Psychiatric/Behavioral: Positive for dysphoric mood.        Vital Signs for last 24 hours  Temp:  [36.7 °C (98 °F)-37.1 °C (98.8 °F)] 36.7 °C (98 °F)  Pulse:  [60-89] 77  Resp:  [1-36] 17  SpO2:  [91 %-99 %] 97 %    Hemodynamic parameters for last 24 hours       Respiratory Data     Respiration: 17, Pulse Oximetry: 97 %     Work Of Breathing / Effort: Mild  RUL Breath Sounds: Clear, RML Breath Sounds: Clear, RLL Breath Sounds: Diminished, BARBARA Breath Sounds: Clear, LLL Breath Sounds: Diminished    Physical Exam  Physical Exam   Constitutional: He is oriented to person, place, and time. He appears well-developed and well-nourished. He is cooperative. No distress.   HENT:   Head: Normocephalic and atraumatic.   Eyes: Pupils are equal, round, and reactive to light. EOM are normal. Right eye exhibits no discharge. Left eye exhibits no discharge. Scleral icterus is present.   Neck: Normal range of motion. Neck supple. No JVD present. No tracheal deviation present.   Cardiovascular: Normal rate, regular rhythm, normal heart sounds and intact distal pulses.    Pulmonary/Chest: Effort normal and breath sounds normal. No respiratory distress. He has no wheezes.   Abdominal: Soft. He exhibits distension. There is no tenderness.   Midline incision is healing well. Clean and dry.  Percutaneous drain output is mildly bilious.   Musculoskeletal: Normal range of motion. He exhibits edema.   Neurological: He is alert and oriented to person, place, and time. No cranial nerve deficit. Coordination normal.   Skin: Skin is warm and dry. No rash noted. No erythema. No pallor.   Psychiatric: He has a normal mood  and affect. His speech is normal and behavior is normal. He expresses impulsivity. He exhibits abnormal recent memory.   Nursing note and vitals reviewed.      Laboratory  Recent Results (from the past 24 hour(s))   CBC WITH DIFFERENTIAL    Collection Time: 06/30/19  4:01 AM   Result Value Ref Range    WBC 12.1 (H) 4.8 - 10.8 K/uL    RBC 2.52 (L) 4.70 - 6.10 M/uL    Hemoglobin 7.1 (L) 14.0 - 18.0 g/dL    Hematocrit 22.3 (L) 42.0 - 52.0 %    MCV 88.5 81.4 - 97.8 fL    MCH 28.2 27.0 - 33.0 pg    MCHC 31.8 (L) 33.7 - 35.3 g/dL    RDW 52.4 (H) 35.9 - 50.0 fL    Platelet Count 587 (H) 164 - 446 K/uL    MPV 10.2 9.0 - 12.9 fL    Neutrophils-Polys 80.10 (H) 44.00 - 72.00 %    Lymphocytes 11.00 (L) 22.00 - 41.00 %    Monocytes 5.50 0.00 - 13.40 %    Eosinophils 1.00 0.00 - 6.90 %    Basophils 0.20 0.00 - 1.80 %    Immature Granulocytes 2.20 (H) 0.00 - 0.90 %    Nucleated RBC 0.00 /100 WBC    Neutrophils (Absolute) 9.67 (H) 1.82 - 7.42 K/uL    Lymphs (Absolute) 1.33 1.00 - 4.80 K/uL    Monos (Absolute) 0.67 0.00 - 0.85 K/uL    Eos (Absolute) 0.12 0.00 - 0.51 K/uL    Baso (Absolute) 0.02 0.00 - 0.12 K/uL    Immature Granulocytes (abs) 0.27 (H) 0.00 - 0.11 K/uL    NRBC (Absolute) 0.00 K/uL   Comp Metabolic Panel    Collection Time: 06/30/19  4:01 AM   Result Value Ref Range    Sodium 138 135 - 145 mmol/L    Potassium 4.0 3.6 - 5.5 mmol/L    Chloride 109 96 - 112 mmol/L    Co2 19 (L) 20 - 33 mmol/L    Anion Gap 10.0 0.0 - 11.9    Glucose 105 (H) 65 - 99 mg/dL    Bun 15 8 - 22 mg/dL    Creatinine 0.63 0.50 - 1.40 mg/dL    Calcium 7.9 (L) 8.5 - 10.5 mg/dL    AST(SGOT) 17 12 - 45 U/L    ALT(SGPT) 18 2 - 50 U/L    Alkaline Phosphatase 282 (H) 30 - 99 U/L    Total Bilirubin 4.4 (H) 0.1 - 1.5 mg/dL    Albumin 2.4 (L) 3.2 - 4.9 g/dL    Total Protein 5.4 (L) 6.0 - 8.2 g/dL    Globulin 3.0 1.9 - 3.5 g/dL    A-G Ratio 0.8 g/dL   ESTIMATED GFR    Collection Time: 06/30/19  4:01 AM   Result Value Ref Range    GFR If  >60  >60 mL/min/1.73 m 2    GFR If Non African American >60 >60 mL/min/1.73 m 2       Fluids    Intake/Output Summary (Last 24 hours) at 06/30/19 1220  Last data filed at 06/30/19 1200   Gross per 24 hour   Intake             1360 ml   Output             1060 ml   Net              300 ml       Core Measures & Quality Metrics  Labs reviewed and Radiology images reviewed  Flor catheter: No Flor  Central line in place: Need for access    DVT Prophylaxis: Enoxaparin (Lovenox)  DVT prophylaxis - mechanical: SCDs  Ulcer prophylaxis: Yes  Antibiotics: Treating active infection/contamination beyond 24 hours perioperative coverage      YADIEL Score  ETOH Screening    Assessment/Plan  Malnutrition (HCC)   Assessment & Plan    Required total parenteral nutrition in the postoperative setting.   6/28 Tapering of TPN initiated.  6/29 TPN tapered off.  Encourage oral alimentation.     Blood loss anemia   Assessment & Plan    Persistent critical anemia.  6/20 Transfused one unit of packed red blood cells.   6/22 Transfused one unit of packed red blood cells.  6/28 Transfused one unit of packed red blood cells.  Continue to trend closely. Transfuse 1 unit PRBC's for hemoglobin less than 7.     Postprocedural intraabdominal abscess   Assessment & Plan    6/27 CT imaging demonstrated a large rim-enhancing fluid collection in the anterior abdomen which measures approximately 22.8 x 3.9 x 16 cm consistent with abscess.  6/28 CT guided percutaneous drain placement.  6/30 Xyvox and Unasyn day 7.       Respiratory failure following trauma and surgery ([facility])   Assessment & Plan    Mechanical ventilatory support following initial surgeries.  6/21 Extubated.   Continue aggressive pulmonary care and hygiene.     Colostomy in place ([facility])- (present on admission)   Assessment & Plan    6/13/19  Resection and closure of colostomy  6/19 having BM's  6/20  Explored for bowel obstruction and infected hematoma.  Anastomosis revised.    6/24  improved  distention, post operative ileus resolving, flatus  6/25 clears initiated  6/27 advancing diet slowly  6/28 clinically improving bowel function-full liquids         Discussed patient condition with Family, RN, RT and Pharmacy.  CRITICAL CARE TIME EXCLUDING PROCEDURES: 35 minutes

## 2019-06-30 NOTE — PROGRESS NOTES
RN skin check complete with FLAQUITO Johnson:  Devices in place SCD, central line, PIVs, BP, SPO2 probe.              Skin assessed under all of the aforementioned devices.               Preventative measures in place including waffle cushion on chair when OOB, reposition of patient and devices q2 hrs, reminded to self reposition as needed, barrier wipes to buttocks.   ·     Sacrum: Intact      Wound consult placedYES/NO: no    Wound reported YES/NO: no  Appropriate LDAs opened YES/NO: yes

## 2019-06-30 NOTE — CARE PLAN
Problem: Safety  Goal: Will remain free from falls  Outcome: PROGRESSING AS EXPECTED  Encourage call light, pt near nurse's station, non-slip socks, side rails up x2    Problem: Bowel/Gastric:  Goal: Will not experience complications related to bowel motility  Outcome: PROGRESSING AS EXPECTED  Bowel protocol in place, monitoring bowel sounds, encouraging mobility

## 2019-06-30 NOTE — CARE PLAN
Problem: Pain Management  Goal: Pain level will decrease to patient's comfort goal    Intervention: Follow pain managment plan developed in collaboration with patient and Interdisciplinary Team  Pharmacological and nonpharmacological methods used to control pain. Pain assessed Q2.        Problem: Knowledge Deficit  Goal: Knowledge of disease process/condition, treatment plan, diagnostic tests, and medications will improve    Intervention: Explain information regarding disease process/condition, treatment plan, diagnostic tests, and medications and document in education  Patient and family updated on plan of care for the shift. All questions answered and needs met at this time.

## 2019-06-30 NOTE — PROGRESS NOTES
2 RN skin check complete with FLAQUITO Franco.   Devices in place central line, EKG leads, BP cuff, pulse ox, PIV, SCDs.   Skin assessed under the following devices central line, EKG leads, BP cuff, pulse ox, PIV, SCDs.   Preventative measures in place including Pillows and mepilex on sacrum  Following areas of concern:   - Old ostomy site, approximated with staples, open to air   - Old JOHAN site on RLQ of abdomen, open to air   - Midline abdominal incision, approximated with staples, open to air  - Blisters on abdomen, open to air     The following interventions in place mepilex to sacrum, pillows to support elbows and heels.   Wound consult placedYES/NO: N\A   Wound reported YES/NO: N\A   Appropriate LDAs opened YES/NO: N\A

## 2019-06-30 NOTE — PROGRESS NOTES
Report given to FLAQUITO Elizabeth. Patient transported to T418 via wheelchair. Chart, medication and belongings with patient.

## 2019-06-30 NOTE — PROGRESS NOTES
Patient arrived to unit from [facility]. POC discussed. Patient's daughter at bedside. Patient denies pain and/or SOB at this time. All needs met.

## 2019-06-30 NOTE — CARE PLAN
Problem: Venous Thromboembolism (VTW)/Deep Vein Thrombosis (DVT) Prevention:  Goal: Patient will participate in Venous Thrombosis (VTE)/Deep Vein Thrombosis (DVT)Prevention Measures  Outcome: MET Date Met: 06/29/19  Up several times today to the chair and BSC.     Problem: Bowel/Gastric:  Goal: Normal bowel function is maintained or improved  Outcome: MET Date Met: 06/29/19  BMx2 today

## 2019-06-30 NOTE — PROGRESS NOTES
2 RN skin check completed with FLAQUITO Johnson     Devices in place:     - cardiac leads  - central line  - BP cuff  - finger probe  - SCD's     Skin assessed under the aforementioned devices.  Following areas of concern:     - Old ostomy site, approximated with staples, INES  - Old JOHAN site on RLQ of abdomen with dressing in place, C/D/I  - Midline abdominal incision with staples, INES  - Dried blisters on abdomen, INES  -Peritoneal drain LLQ, suture intact, drsg C/D/I    Preventative measures in place. Appropriate wound LDA's in place.

## 2019-07-01 ENCOUNTER — APPOINTMENT (OUTPATIENT)
Dept: RADIOLOGY | Facility: MEDICAL CENTER | Age: 59
DRG: 329 | End: 2019-07-01
Attending: SURGERY
Payer: MEDICAID

## 2019-07-01 LAB
ALBUMIN SERPL BCP-MCNC: 2.4 G/DL (ref 3.2–4.9)
ALBUMIN/GLOB SERPL: 0.8 G/DL
ALP SERPL-CCNC: 294 U/L (ref 30–99)
ALT SERPL-CCNC: 22 U/L (ref 2–50)
ANION GAP SERPL CALC-SCNC: 8 MMOL/L (ref 0–11.9)
AST SERPL-CCNC: 18 U/L (ref 12–45)
BACTERIA FLD AEROBE CULT: NORMAL
BASOPHILS # BLD AUTO: 0.3 % (ref 0–1.8)
BASOPHILS # BLD: 0.03 K/UL (ref 0–0.12)
BILIRUB SERPL-MCNC: 3.3 MG/DL (ref 0.1–1.5)
BUN SERPL-MCNC: 11 MG/DL (ref 8–22)
CALCIUM SERPL-MCNC: 8.1 MG/DL (ref 8.5–10.5)
CHLORIDE SERPL-SCNC: 110 MMOL/L (ref 96–112)
CO2 SERPL-SCNC: 21 MMOL/L (ref 20–33)
CREAT SERPL-MCNC: 0.6 MG/DL (ref 0.5–1.4)
EOSINOPHIL # BLD AUTO: 0.14 K/UL (ref 0–0.51)
EOSINOPHIL NFR BLD: 1.3 % (ref 0–6.9)
ERYTHROCYTE [DISTWIDTH] IN BLOOD BY AUTOMATED COUNT: 53.1 FL (ref 35.9–50)
GLOBULIN SER CALC-MCNC: 3.1 G/DL (ref 1.9–3.5)
GLUCOSE SERPL-MCNC: 109 MG/DL (ref 65–99)
GRAM STN SPEC: NORMAL
HCT VFR BLD AUTO: 23.4 % (ref 42–52)
HGB BLD-MCNC: 7.3 G/DL (ref 14–18)
IMM GRANULOCYTES # BLD AUTO: 0.26 K/UL (ref 0–0.11)
IMM GRANULOCYTES NFR BLD AUTO: 2.4 % (ref 0–0.9)
LYMPHOCYTES # BLD AUTO: 1.45 K/UL (ref 1–4.8)
LYMPHOCYTES NFR BLD: 13.2 % (ref 22–41)
MAGNESIUM SERPL-MCNC: 2.1 MG/DL (ref 1.5–2.5)
MCH RBC QN AUTO: 27.9 PG (ref 27–33)
MCHC RBC AUTO-ENTMCNC: 31.2 G/DL (ref 33.7–35.3)
MCV RBC AUTO: 89.3 FL (ref 81.4–97.8)
MONOCYTES # BLD AUTO: 0.61 K/UL (ref 0–0.85)
MONOCYTES NFR BLD AUTO: 5.6 % (ref 0–13.4)
NEUTROPHILS # BLD AUTO: 8.49 K/UL (ref 1.82–7.42)
NEUTROPHILS NFR BLD: 77.2 % (ref 44–72)
NRBC # BLD AUTO: 0 K/UL
NRBC BLD-RTO: 0 /100 WBC
PHOSPHATE SERPL-MCNC: 4.3 MG/DL (ref 2.5–4.5)
PLATELET # BLD AUTO: 620 K/UL (ref 164–446)
PMV BLD AUTO: 9.7 FL (ref 9–12.9)
POTASSIUM SERPL-SCNC: 4.1 MMOL/L (ref 3.6–5.5)
PROT SERPL-MCNC: 5.5 G/DL (ref 6–8.2)
RBC # BLD AUTO: 2.62 M/UL (ref 4.7–6.1)
SIGNIFICANT IND 70042: NORMAL
SITE SITE: NORMAL
SODIUM SERPL-SCNC: 139 MMOL/L (ref 135–145)
SOURCE SOURCE: NORMAL
WBC # BLD AUTO: 11 K/UL (ref 4.8–10.8)

## 2019-07-01 PROCEDURE — 700105 HCHG RX REV CODE 258

## 2019-07-01 PROCEDURE — 80053 COMPREHEN METABOLIC PANEL: CPT

## 2019-07-01 PROCEDURE — 700102 HCHG RX REV CODE 250 W/ 637 OVERRIDE(OP): Performed by: NURSE PRACTITIONER

## 2019-07-01 PROCEDURE — 71045 X-RAY EXAM CHEST 1 VIEW: CPT

## 2019-07-01 PROCEDURE — 83735 ASSAY OF MAGNESIUM: CPT

## 2019-07-01 PROCEDURE — 700111 HCHG RX REV CODE 636 W/ 250 OVERRIDE (IP): Performed by: SURGERY

## 2019-07-01 PROCEDURE — 700102 HCHG RX REV CODE 250 W/ 637 OVERRIDE(OP): Performed by: SURGERY

## 2019-07-01 PROCEDURE — A9270 NON-COVERED ITEM OR SERVICE: HCPCS | Performed by: NURSE PRACTITIONER

## 2019-07-01 PROCEDURE — 84100 ASSAY OF PHOSPHORUS: CPT

## 2019-07-01 PROCEDURE — A9270 NON-COVERED ITEM OR SERVICE: HCPCS | Performed by: SURGERY

## 2019-07-01 PROCEDURE — 85025 COMPLETE CBC W/AUTO DIFF WBC: CPT

## 2019-07-01 PROCEDURE — 700105 HCHG RX REV CODE 258: Performed by: SURGERY

## 2019-07-01 PROCEDURE — 770001 HCHG ROOM/CARE - MED/SURG/GYN PRIV*

## 2019-07-01 PROCEDURE — 700112 HCHG RX REV CODE 229: Performed by: SURGERY

## 2019-07-01 RX ORDER — OXYCODONE HYDROCHLORIDE 5 MG/1
5 TABLET ORAL EVERY 6 HOURS PRN
Status: DISCONTINUED | OUTPATIENT
Start: 2019-07-01 | End: 2019-07-03 | Stop reason: HOSPADM

## 2019-07-01 RX ORDER — SODIUM CHLORIDE 9 MG/ML
INJECTION, SOLUTION INTRAVENOUS
Status: COMPLETED
Start: 2019-07-01 | End: 2019-07-01

## 2019-07-01 RX ADMIN — LINEZOLID 600 MG: 600 INJECTION, SOLUTION INTRAVENOUS at 17:23

## 2019-07-01 RX ADMIN — AMPICILLIN SODIUM AND SULBACTAM SODIUM 3 G: 2; 1 INJECTION, POWDER, FOR SOLUTION INTRAMUSCULAR; INTRAVENOUS at 23:30

## 2019-07-01 RX ADMIN — INDOMETHACIN 50 MG: 50 CAPSULE ORAL at 11:24

## 2019-07-01 RX ADMIN — SODIUM CHLORIDE 500 ML: 9 INJECTION, SOLUTION INTRAVENOUS at 23:30

## 2019-07-01 RX ADMIN — OXYCODONE HYDROCHLORIDE 5 MG: 5 TABLET ORAL at 11:25

## 2019-07-01 RX ADMIN — AMPICILLIN SODIUM AND SULBACTAM SODIUM 3 G: 2; 1 INJECTION, POWDER, FOR SOLUTION INTRAMUSCULAR; INTRAVENOUS at 17:23

## 2019-07-01 RX ADMIN — METOCLOPRAMIDE 10 MG: 5 INJECTION, SOLUTION INTRAMUSCULAR; INTRAVENOUS at 05:33

## 2019-07-01 RX ADMIN — INDOMETHACIN 50 MG: 50 CAPSULE ORAL at 17:23

## 2019-07-01 RX ADMIN — ENOXAPARIN SODIUM 30 MG: 100 INJECTION SUBCUTANEOUS at 05:33

## 2019-07-01 RX ADMIN — METOCLOPRAMIDE 10 MG: 5 INJECTION, SOLUTION INTRAMUSCULAR; INTRAVENOUS at 11:24

## 2019-07-01 RX ADMIN — METOCLOPRAMIDE 10 MG: 5 INJECTION, SOLUTION INTRAMUSCULAR; INTRAVENOUS at 23:30

## 2019-07-01 RX ADMIN — LINEZOLID 600 MG: 600 INJECTION, SOLUTION INTRAVENOUS at 06:41

## 2019-07-01 RX ADMIN — METOCLOPRAMIDE 10 MG: 5 INJECTION, SOLUTION INTRAMUSCULAR; INTRAVENOUS at 17:23

## 2019-07-01 RX ADMIN — SENNOSIDES,DOCUSATE SODIUM 1 TABLET: 8.6; 5 TABLET, FILM COATED ORAL at 20:01

## 2019-07-01 RX ADMIN — OXYCODONE HYDROCHLORIDE 5 MG: 5 TABLET ORAL at 20:01

## 2019-07-01 RX ADMIN — AMPICILLIN SODIUM AND SULBACTAM SODIUM 3 G: 2; 1 INJECTION, POWDER, FOR SOLUTION INTRAMUSCULAR; INTRAVENOUS at 11:24

## 2019-07-01 RX ADMIN — DOCUSATE SODIUM 100 MG: 100 CAPSULE, LIQUID FILLED ORAL at 17:23

## 2019-07-01 RX ADMIN — AMPICILLIN SODIUM AND SULBACTAM SODIUM 3 G: 2; 1 INJECTION, POWDER, FOR SOLUTION INTRAMUSCULAR; INTRAVENOUS at 05:31

## 2019-07-01 RX ADMIN — POLYETHYLENE GLYCOL 3350 1 PACKET: 17 POWDER, FOR SOLUTION ORAL at 17:23

## 2019-07-01 RX ADMIN — INDOMETHACIN 50 MG: 50 CAPSULE ORAL at 08:24

## 2019-07-01 ASSESSMENT — ENCOUNTER SYMPTOMS
NAUSEA: 0
SPEECH CHANGE: 0
CHILLS: 0
DIARRHEA: 1
VOMITING: 0
ABDOMINAL PAIN: 1
SHORTNESS OF BREATH: 0
FEVER: 0

## 2019-07-01 NOTE — CARE PLAN
Problem: Pain Management  Goal: Pain level will decrease to patient's comfort goal  Outcome: PROGRESSING AS EXPECTED  Pain being managed with PRN pain medication.    Problem: Communication  Goal: The ability to communicate needs accurately and effectively will improve  Outcome: PROGRESSING AS EXPECTED  Patient and family updated on POC, all questions answered at this time.    Problem: Safety  Goal: Will remain free from injury  Outcome: PROGRESSING AS EXPECTED  Bed in locked and lowest position, call light within reach, room near nurses station.

## 2019-07-01 NOTE — PROGRESS NOTES
Pt A&O x 4.     Vitals: /71   Pulse 83   Temp 36.7 °C (98 °F) (Temporal)   Resp 17   Ht 1.829 m (6')   Wt 86 kg (189 lb 9.5 oz)   SpO2 99%   BMI 25.71 kg/m²      Pt rates pain 8 out of 10. Medicated per MAR.      Neuro: LYNN. Denies new onset of numbness/ tingling.     Cardiac: Denies new onset of chest pain.     Vascular: Pulses 2+ BUE, BLE. No edema noted.     Respiratory: Lungs sound clear to auscultation. Pulling 2000 on IS, effective, strong effort. On room air.  on, satting in 90's. Denies SOB.     GI: Abdomen soft, tender. Hyperactive bowel sounds, + flatus, + BM. BMs are loose, patient refusing stool softeners. Denies nausea/ vomiting.     : Pt voiding adequately.      MSK: Pt up to bathroom with stand by assist, tolerating well.     Integumentary: Midline abd incision, approximated with staples, INES, no drainage noted. LLQ IR drain, flushed per order, + gisell/clear output, R abd old JOHAN site, dressing in place, CDI. Skin intact otherwise.     Labs noted.     Fall precautions in place: Bed locked in lowest position, Upper bed rails up, treaded socks in place, personal belongings within reach, call light within reach, appropriate mobility signs in place, - bed alarm. Pt calls appropriately.      Pt updated on POC.

## 2019-07-01 NOTE — PROGRESS NOTES
Trauma / Surgical Daily Progress Note    Date of Service  7/1/2019    Chief Complaint  59 y.o. male admitted 6/13/2019 with DIVERTICULITIS     Interval Events  Transfer from SICU to [facility]  Feeling better, tolerating oral diet, having loose stools  Midline incision clean, staples intact  IR drain with small bilious output  Xyvox and Unasyn day 8, antibiotic day 13    - Iron studies, replacement per pharmacy    Review of Systems  Review of Systems   Constitutional: Negative for chills, fever and malaise/fatigue.   Respiratory: Negative for shortness of breath.    Cardiovascular: Negative for chest pain.   Gastrointestinal: Positive for abdominal pain (improving) and diarrhea. Negative for nausea and vomiting.   Genitourinary: Negative for dysuria (voiding).   Skin: Negative for rash.   Neurological: Negative for speech change.        Vital Signs  Temp:  [36.7 °C (98 °F)-37.5 °C (99.5 °F)] 37.1 °C (98.8 °F)  Pulse:  [76-83] 76  Resp:  [17-20] 18  BP: (114-128)/(62-83) 125/83  SpO2:  [92 %-99 %] 96 %    Physical Exam  Physical Exam   Constitutional: He is oriented to person, place, and time. He appears well-developed. He is active and cooperative. No distress.   Pulmonary/Chest: Effort normal. No respiratory distress.   Abdominal: Soft. He exhibits distension. There is no tenderness. There is no guarding.   Midline incision clean, staples intact  Left abdominal percutaneous drain output is mildly bilious   Neurological: He is alert and oriented to person, place, and time.   Skin: Skin is warm and dry. No pallor.   Psychiatric: He has a normal mood and affect. His behavior is normal.   Nursing note and vitals reviewed.      Laboratory  Recent Results (from the past 24 hour(s))   Comp Metabolic Panel    Collection Time: 07/01/19  4:20 AM   Result Value Ref Range    Sodium 139 135 - 145 mmol/L    Potassium 4.1 3.6 - 5.5 mmol/L    Chloride 110 96 - 112 mmol/L    Co2 21 20 - 33 mmol/L    Anion Gap 8.0 0.0 - 11.9    Glucose 109  (H) 65 - 99 mg/dL    Bun 11 8 - 22 mg/dL    Creatinine 0.60 0.50 - 1.40 mg/dL    Calcium 8.1 (L) 8.5 - 10.5 mg/dL    AST(SGOT) 18 12 - 45 U/L    ALT(SGPT) 22 2 - 50 U/L    Alkaline Phosphatase 294 (H) 30 - 99 U/L    Total Bilirubin 3.3 (H) 0.1 - 1.5 mg/dL    Albumin 2.4 (L) 3.2 - 4.9 g/dL    Total Protein 5.5 (L) 6.0 - 8.2 g/dL    Globulin 3.1 1.9 - 3.5 g/dL    A-G Ratio 0.8 g/dL   CBC WITH DIFFERENTIAL    Collection Time: 07/01/19  4:20 AM   Result Value Ref Range    WBC 11.0 (H) 4.8 - 10.8 K/uL    RBC 2.62 (L) 4.70 - 6.10 M/uL    Hemoglobin 7.3 (L) 14.0 - 18.0 g/dL    Hematocrit 23.4 (L) 42.0 - 52.0 %    MCV 89.3 81.4 - 97.8 fL    MCH 27.9 27.0 - 33.0 pg    MCHC 31.2 (L) 33.7 - 35.3 g/dL    RDW 53.1 (H) 35.9 - 50.0 fL    Platelet Count 620 (H) 164 - 446 K/uL    MPV 9.7 9.0 - 12.9 fL    Neutrophils-Polys 77.20 (H) 44.00 - 72.00 %    Lymphocytes 13.20 (L) 22.00 - 41.00 %    Monocytes 5.60 0.00 - 13.40 %    Eosinophils 1.30 0.00 - 6.90 %    Basophils 0.30 0.00 - 1.80 %    Immature Granulocytes 2.40 (H) 0.00 - 0.90 %    Nucleated RBC 0.00 /100 WBC    Neutrophils (Absolute) 8.49 (H) 1.82 - 7.42 K/uL    Lymphs (Absolute) 1.45 1.00 - 4.80 K/uL    Monos (Absolute) 0.61 0.00 - 0.85 K/uL    Eos (Absolute) 0.14 0.00 - 0.51 K/uL    Baso (Absolute) 0.03 0.00 - 0.12 K/uL    Immature Granulocytes (abs) 0.26 (H) 0.00 - 0.11 K/uL    NRBC (Absolute) 0.00 K/uL   MAGNESIUM    Collection Time: 07/01/19  4:20 AM   Result Value Ref Range    Magnesium 2.1 1.5 - 2.5 mg/dL   PHOSPHORUS    Collection Time: 07/01/19  4:20 AM   Result Value Ref Range    Phosphorus 4.3 2.5 - 4.5 mg/dL   ESTIMATED GFR    Collection Time: 07/01/19  4:20 AM   Result Value Ref Range    GFR If African American >60 >60 mL/min/1.73 m 2    GFR If Non African American >60 >60 mL/min/1.73 m 2       Fluids    Intake/Output Summary (Last 24 hours) at 07/01/19 1600  Last data filed at 07/01/19 0827   Gross per 24 hour   Intake              370 ml   Output               80  ml   Net              290 ml       Core Measures & Quality Metrics  Labs reviewed, Radiology images reviewed and Medications reviewed  Flor catheter: No Flor  Central line in place: Concentrated IV drugs    DVT Prophylaxis: Enoxaparin (Lovenox)  DVT prophylaxis - mechanical: SCDs    Antibiotics: Treating active infection/contamination beyond 24 hours perioperative coverage  Assessed for rehab: Patient returned to prior level of function, rehabilitation not indicated at this time    YADIEL Score  ETOH Screening    Assessment/Plan  Malnutrition (HCC)   Assessment & Plan    6/21 Prolonged ileus predicted following 2nd lapartomy. TPN started.  6/25 Oral diet started.  6/28 Tapering of TPN initiated.  6/29 TPN tapered off.  Continue oral alimentation.     Blood loss anemia   Assessment & Plan    Persistent critical anemia.  6/20 Transfused 2 uPRBCs.  6/28 Transfused 1 uPRBC.  7/2 Iron studies and replacement per pharmacy.  Transfuse 1 unit PRBC's for hemoglobin less than 7.     Postprocedural intraabdominal abscess   Assessment & Plan    6/27 CT imaging demonstrated a large rim-enhancing fluid collection in the anterior abdomen which measures approximately 22.8 x 3.9 x 16 cm consistent with abscess.  6/28 CT guided percutaneous drain placement.  7/1 Xyvox and Unasyn day 8, antibiotic day 13.     Respiratory failure following trauma and surgery ([facility])   Assessment & Plan    Mechanical ventilatory support following initial surgeries.  6/21 Extubated.  Continue aggressive pulmonary care and hygiene.     Colostomy in place ([facility])- (present on admission)   Assessment & Plan    6/13 Resection and closure of colostomy.  6/20 Explored for bowel obstruction and infected hematoma.  Anastomosis revised.  6/25 Oral diet initiated.  7/1 Ostomy viable and producing.         Discussed patient condition with RN, , Patient and general surgery. Dr. Mccarthy

## 2019-07-01 NOTE — DIETARY
Nutrition Services: Update   Day 18 of admit.  Syed Ladd is a 59 y.o. male with admitting DX of diverticulitis.      Pt was previously receiving TPN @ 100% however this started to taper down on 6/28 and then was discontinued 6/29.  Pt has been on a regular diet since 6/29 and per chart, pt with variable PO intake ranging from <25%-100% however most meals were >50%.  Pt is also receiving Boost Plus TID.  RD reviewed other appropriate snacks and nutritional supplement regimen - will continue to monitor for sufficient intake.     Malnutrition Risk: No risk identified at this time.     Recommendations/Plan:  1. Boost Plus TID.   2. Encourage intake of meals and supplements.   3. Document intake of all meals and supplements as % taken in ADL's to provide interdisciplinary communication across all shifts.   4. Monitor weight.  5. Nutrition rep will continue to see patient for ongoing meal and snack preferences.    RD following

## 2019-07-01 NOTE — PROGRESS NOTES
Surgical Progress Note:    POD #10 S/P Exploratory celiotomy, washout of intra-abdominal hematoma and revision of colocolostomy,  POD #3 IR drain placement  Continues to feel much better. Neg N/V, + FLATUS/ + BM, Pain controlled, Denies chest pain or SOB.  Ambulating. Tolerating regular diet. IR drain with bilious output.    PE:  /83   Pulse 76   Temp 37.1 °C (98.8 °F) (Temporal)   Resp 18   Ht 1.829 m (6')   Wt 86 kg (189 lb 9.5 oz)   SpO2 96%   BMI 25.71 kg/m²     I/O:   Intake/Output Summary (Last 24 hours) at 07/01/19 1633  Last data filed at 07/01/19 0827   Gross per 24 hour   Intake              370 ml   Output               80 ml   Net              290 ml         Review of Systems   Constitutional: Negative.  Negative for chills and fever.   Respiratory: Negative for shortness of breath.    Cardiovascular: Negative for chest pain.   Gastrointestinal: Negative for nausea and vomiting.        +flatus/+ stool   Genitourinary: Negative.      Physical Exam   Constitutional:  appears well-developed.   Neck: Neck supple.   Cardiovascular: Normal rate.    Pulmonary/Chest: Effort normal.   Abdominal: Soft.  exhibits no distension. Appropriate tenderness.   Incisions clean, dry, and intact  IR drain with bilious drainage  Musculoskeletal: Normal range of motion.   Neurological:  alert.   Skin:  Warm and dry.   Extremities: najera, no edema    Labs:  Recent Labs      06/29/19   0315  06/30/19   0401  07/01/19   0420   WBC  12.8*  12.1*  11.0*   RBC  2.43*  2.52*  2.62*   HEMOGLOBIN  6.6*  7.1*  7.3*   HEMATOCRIT  21.2*  22.3*  23.4*   MCV  87.2  88.5  89.3   MCH  27.2  28.2  27.9   RDW  50.5*  52.4*  53.1*   PLATELETCT  469*  587*  620*   MPV  10.3  10.2  9.7   NEUTSPOLYS  81.90*  80.10*  77.20*   LYMPHOCYTES  9.20*  11.00*  13.20*   MONOCYTES  5.50  5.50  5.60   EOSINOPHILS  0.90  1.00  1.30   BASOPHILS  0.20  0.20  0.30     Recent Labs      06/29/19   0315  06/30/19   0401  07/01/19   0420   SODIUM  141  138   139   POTASSIUM  4.2  4.0  4.1   CHLORIDE  110  109  110   CO2  21  19*  21   GLUCOSE  130*  105*  109*   BUN  20  15  11         A/P:     - Regular low residue diet as tolerated.  - IS Q One hour while awake  - Ambulate.  Out of bed for all meals please  - Pain controlled.  Cont PO pain medication  - Labs noted, recheck in am  - WBC's trending down  - Xyvox and Unasyn, cont  - IR drain cultures Neg @72 hours  - DVT propholaxis, ok to cont Lovenox, sequentials and ambulate  - Adequate urine output.  Cont, to monitor      Malnutrition ([facility])   Assessment & Plan    6/21 Prolonged ileus predicted following 2nd lapartomy. TPN started.  6/25 Oral diet started.  6/28 Tapering of TPN initiated.  6/29 TPN tapered off.  Continue oral alimentation.     Blood loss anemia   Assessment & Plan    Persistent critical anemia.  6/20 Transfused 2 uPRBCs.  6/28 Transfused 1 uPRBC.  7/2 Iron studies and replacement per pharmacy.  Transfuse 1 unit PRBC's for hemoglobin less than 7.     Postprocedural intraabdominal abscess   Assessment & Plan    6/27 CT imaging demonstrated a large rim-enhancing fluid collection in the anterior abdomen which measures approximately 22.8 x 3.9 x 16 cm consistent with abscess.  6/28 CT guided percutaneous drain placement.  7/1 Xyvox and Unasyn day 8, antibiotic day 13.     Respiratory failure following trauma and surgery ([facility])   Assessment & Plan    Mechanical ventilatory support following initial surgeries.  6/21 Extubated.  Continue aggressive pulmonary care and hygiene.     Colostomy in place ([facility])- (present on admission)   Assessment & Plan    6/13 Resection and closure of colostomy.  6/20 Explored for bowel obstruction and infected hematoma.  Anastomosis revised.  6/25 Oral diet initiated.  7/1 Ostomy viable and producing.         Discussed with Patient, RN and Dr. Dony Lee, A.P.N.  [facility]  [phone]

## 2019-07-02 LAB
ALBUMIN SERPL BCP-MCNC: 2.6 G/DL (ref 3.2–4.9)
ALBUMIN/GLOB SERPL: 0.9 G/DL
ALP SERPL-CCNC: 266 U/L (ref 30–99)
ALT SERPL-CCNC: 21 U/L (ref 2–50)
ANION GAP SERPL CALC-SCNC: 9 MMOL/L (ref 0–11.9)
AST SERPL-CCNC: 15 U/L (ref 12–45)
BASOPHILS # BLD AUTO: 0.3 % (ref 0–1.8)
BASOPHILS # BLD: 0.03 K/UL (ref 0–0.12)
BILIRUB SERPL-MCNC: 3 MG/DL (ref 0.1–1.5)
BUN SERPL-MCNC: 10 MG/DL (ref 8–22)
CALCIUM SERPL-MCNC: 8.2 MG/DL (ref 8.5–10.5)
CHLORIDE SERPL-SCNC: 109 MMOL/L (ref 96–112)
CO2 SERPL-SCNC: 22 MMOL/L (ref 20–33)
CREAT SERPL-MCNC: 0.63 MG/DL (ref 0.5–1.4)
EOSINOPHIL # BLD AUTO: 0.15 K/UL (ref 0–0.51)
EOSINOPHIL NFR BLD: 1.3 % (ref 0–6.9)
ERYTHROCYTE [DISTWIDTH] IN BLOOD BY AUTOMATED COUNT: 53.1 FL (ref 35.9–50)
FERRITIN SERPL-MCNC: 290.2 NG/ML (ref 22–322)
GLOBULIN SER CALC-MCNC: 2.9 G/DL (ref 1.9–3.5)
GLUCOSE SERPL-MCNC: 102 MG/DL (ref 65–99)
HCT VFR BLD AUTO: 23.8 % (ref 42–52)
HGB BLD-MCNC: 7.2 G/DL (ref 14–18)
IMM GRANULOCYTES # BLD AUTO: 0.22 K/UL (ref 0–0.11)
IMM GRANULOCYTES NFR BLD AUTO: 2 % (ref 0–0.9)
IRON SATN MFR SERPL: 18 % (ref 15–55)
IRON SERPL-MCNC: 41 UG/DL (ref 50–180)
LYMPHOCYTES # BLD AUTO: 1.44 K/UL (ref 1–4.8)
LYMPHOCYTES NFR BLD: 12.9 % (ref 22–41)
MCH RBC QN AUTO: 27.5 PG (ref 27–33)
MCHC RBC AUTO-ENTMCNC: 30.3 G/DL (ref 33.7–35.3)
MCV RBC AUTO: 90.8 FL (ref 81.4–97.8)
MONOCYTES # BLD AUTO: 0.66 K/UL (ref 0–0.85)
MONOCYTES NFR BLD AUTO: 5.9 % (ref 0–13.4)
NEUTROPHILS # BLD AUTO: 8.65 K/UL (ref 1.82–7.42)
NEUTROPHILS NFR BLD: 77.6 % (ref 44–72)
NRBC # BLD AUTO: 0 K/UL
NRBC BLD-RTO: 0 /100 WBC
PLATELET # BLD AUTO: 629 K/UL (ref 164–446)
PMV BLD AUTO: 9.5 FL (ref 9–12.9)
POTASSIUM SERPL-SCNC: 4 MMOL/L (ref 3.6–5.5)
PROT SERPL-MCNC: 5.5 G/DL (ref 6–8.2)
RBC # BLD AUTO: 2.62 M/UL (ref 4.7–6.1)
SODIUM SERPL-SCNC: 140 MMOL/L (ref 135–145)
TIBC SERPL-MCNC: 223 UG/DL (ref 250–450)
WBC # BLD AUTO: 11.2 K/UL (ref 4.8–10.8)

## 2019-07-02 PROCEDURE — 700105 HCHG RX REV CODE 258: Performed by: NURSE PRACTITIONER

## 2019-07-02 PROCEDURE — A9270 NON-COVERED ITEM OR SERVICE: HCPCS | Performed by: NURSE PRACTITIONER

## 2019-07-02 PROCEDURE — 700102 HCHG RX REV CODE 250 W/ 637 OVERRIDE(OP): Performed by: SURGERY

## 2019-07-02 PROCEDURE — 770001 HCHG ROOM/CARE - MED/SURG/GYN PRIV*

## 2019-07-02 PROCEDURE — 700112 HCHG RX REV CODE 229: Performed by: SURGERY

## 2019-07-02 PROCEDURE — 700105 HCHG RX REV CODE 258: Performed by: SURGERY

## 2019-07-02 PROCEDURE — 700111 HCHG RX REV CODE 636 W/ 250 OVERRIDE (IP): Performed by: NURSE PRACTITIONER

## 2019-07-02 PROCEDURE — A9270 NON-COVERED ITEM OR SERVICE: HCPCS | Performed by: SURGERY

## 2019-07-02 PROCEDURE — 83550 IRON BINDING TEST: CPT

## 2019-07-02 PROCEDURE — 700111 HCHG RX REV CODE 636 W/ 250 OVERRIDE (IP): Performed by: SURGERY

## 2019-07-02 PROCEDURE — 700102 HCHG RX REV CODE 250 W/ 637 OVERRIDE(OP): Performed by: NURSE PRACTITIONER

## 2019-07-02 PROCEDURE — 83540 ASSAY OF IRON: CPT

## 2019-07-02 PROCEDURE — 82728 ASSAY OF FERRITIN: CPT

## 2019-07-02 PROCEDURE — 85025 COMPLETE CBC W/AUTO DIFF WBC: CPT

## 2019-07-02 PROCEDURE — 80053 COMPREHEN METABOLIC PANEL: CPT

## 2019-07-02 RX ORDER — LINEZOLID 600 MG/1
600 TABLET, FILM COATED ORAL EVERY 12 HOURS
Status: DISCONTINUED | OUTPATIENT
Start: 2019-07-02 | End: 2019-07-03 | Stop reason: HOSPADM

## 2019-07-02 RX ORDER — AMOXICILLIN AND CLAVULANATE POTASSIUM 875; 125 MG/1; MG/1
1 TABLET, FILM COATED ORAL EVERY 12 HOURS
Status: DISCONTINUED | OUTPATIENT
Start: 2019-07-02 | End: 2019-07-03 | Stop reason: HOSPADM

## 2019-07-02 RX ORDER — DIPHENHYDRAMINE HCL 25 MG
25 TABLET ORAL ONCE
Status: COMPLETED | OUTPATIENT
Start: 2019-07-02 | End: 2019-07-02

## 2019-07-02 RX ORDER — ACETAMINOPHEN 325 MG/1
650 TABLET ORAL ONCE
Status: COMPLETED | OUTPATIENT
Start: 2019-07-02 | End: 2019-07-02

## 2019-07-02 RX ORDER — DIPHENHYDRAMINE HYDROCHLORIDE 50 MG/ML
25 INJECTION INTRAMUSCULAR; INTRAVENOUS ONCE
Status: COMPLETED | OUTPATIENT
Start: 2019-07-02 | End: 2019-07-02

## 2019-07-02 RX ADMIN — LINEZOLID 600 MG: 600 TABLET, FILM COATED ORAL at 17:08

## 2019-07-02 RX ADMIN — DOCUSATE SODIUM 100 MG: 100 CAPSULE, LIQUID FILLED ORAL at 17:08

## 2019-07-02 RX ADMIN — OXYCODONE HYDROCHLORIDE 5 MG: 5 TABLET ORAL at 05:46

## 2019-07-02 RX ADMIN — AMOXICILLIN AND CLAVULANATE POTASSIUM 1 TABLET: 875; 125 TABLET, FILM COATED ORAL at 10:27

## 2019-07-02 RX ADMIN — METOCLOPRAMIDE 10 MG: 5 INJECTION, SOLUTION INTRAMUSCULAR; INTRAVENOUS at 23:49

## 2019-07-02 RX ADMIN — AMOXICILLIN AND CLAVULANATE POTASSIUM 1 TABLET: 875; 125 TABLET, FILM COATED ORAL at 17:08

## 2019-07-02 RX ADMIN — OXYCODONE HYDROCHLORIDE 5 MG: 5 TABLET ORAL at 18:07

## 2019-07-02 RX ADMIN — DIPHENHYDRAMINE HCL 25 MG: 25 TABLET ORAL at 10:27

## 2019-07-02 RX ADMIN — POLYETHYLENE GLYCOL 3350 1 PACKET: 17 POWDER, FOR SOLUTION ORAL at 05:41

## 2019-07-02 RX ADMIN — DOCUSATE SODIUM 100 MG: 100 CAPSULE, LIQUID FILLED ORAL at 05:42

## 2019-07-02 RX ADMIN — SODIUM CHLORIDE 2400 MG: 9 INJECTION, SOLUTION INTRAVENOUS at 14:47

## 2019-07-02 RX ADMIN — ENOXAPARIN SODIUM 40 MG: 100 INJECTION SUBCUTANEOUS at 05:42

## 2019-07-02 RX ADMIN — INDOMETHACIN 50 MG: 50 CAPSULE ORAL at 17:08

## 2019-07-02 RX ADMIN — METOCLOPRAMIDE 10 MG: 5 INJECTION, SOLUTION INTRAMUSCULAR; INTRAVENOUS at 05:43

## 2019-07-02 RX ADMIN — AMPICILLIN SODIUM AND SULBACTAM SODIUM 3 G: 2; 1 INJECTION, POWDER, FOR SOLUTION INTRAMUSCULAR; INTRAVENOUS at 05:40

## 2019-07-02 RX ADMIN — MAGNESIUM HYDROXIDE 30 ML: 400 SUSPENSION ORAL at 05:42

## 2019-07-02 RX ADMIN — LINEZOLID 600 MG: 600 INJECTION, SOLUTION INTRAVENOUS at 05:39

## 2019-07-02 RX ADMIN — ACETAMINOPHEN 650 MG: 325 TABLET, FILM COATED ORAL at 10:27

## 2019-07-02 RX ADMIN — SODIUM CHLORIDE 25 MG: 9 INJECTION, SOLUTION INTRAVENOUS at 10:33

## 2019-07-02 RX ADMIN — INDOMETHACIN 50 MG: 50 CAPSULE ORAL at 08:36

## 2019-07-02 RX ADMIN — METOCLOPRAMIDE 10 MG: 5 INJECTION, SOLUTION INTRAMUSCULAR; INTRAVENOUS at 17:08

## 2019-07-02 NOTE — PROGRESS NOTES
IV Iron Per Pharmacy Note    Patient Lean Body Weight:  77.6 kg  Reason for Iron Replacement:Iron Deficiency Anemia      Lab Results   Component Value Date/Time    WBC 11.2 (H) 07/02/2019 03:45 AM    RBC 2.62 (L) 07/02/2019 03:45 AM    HEMOGLOBIN 7.2 (L) 07/02/2019 03:45 AM    HEMATOCRIT 23.8 (L) 07/02/2019 03:45 AM    MCV 90.8 07/02/2019 03:45 AM    MCH 27.5 07/02/2019 03:45 AM    MCHC 30.3 (L) 07/02/2019 03:45 AM    MPV 9.5 07/02/2019 03:45 AM       Recent Labs      07/02/19   0345   FERRITIN  290.2   IRON  41*         Recent Labs      07/02/19   0345   CREATININE  0.63          Assessment/Plan:  1. IV Iron Indicated.   2. Give Iron Dextran 25 mg IV test dose following diphenhydramine/acetaminophen premeds over 30 minutes per protocol.  3. If no reaction (Anaphylaxis, Hypotension/Hypertension, N/V/D, Chest pain/Back Pain, Urticaria/Pruritis) in the next hour, proceed to full dose. Nursing to call the pharmacy IV room at ext. [phone] for full dose.  4. Full dose: Iron Dextran 2400 mg IV over 4 hours. Continue to monitor for delayed ADR including: Arthralgia/myalgia, Headache/backache, chills/dizziness/malaise, moderate to high fever and n/v.      Yoselin Franco, PharmD

## 2019-07-02 NOTE — PROGRESS NOTES
Assumed care of pt at shift change, report received from day shift RN  /64   Pulse 73   Temp 36.8 °C (98.3 °F) (Temporal)   Resp 18   Ht 1.829 m (6')   Wt 86 kg (189 lb 9.5 oz)   SpO2 95%   BMI 25.71 kg/m²   A & O x 4, complaining of 8/10 pain, see MAR  18G LAC PIV in place, saline locked  L subclavian, triple lumen in place, one line infusing  Midline with staples INES, CDI  L IR drain in place, small drainage noted  Generalized abdominal and flank edema noted  Slight jaundice noted on skin  Pt ambulates independently, refused ambulation this evening, states he will ambulate in the morning  GI soft, low fiber diet, 6 meals a day  Last BM x3 7/1   Urinating fine in the toilet  Call light within reach, pt calls for assistance

## 2019-07-02 NOTE — PROGRESS NOTES
Surgical Progress Note:    POD #11 S/P Exploratory celiotomy, washout of intra-abdominal hematoma and revision of colocolostomy,  POD #4 IR drain placement  Continues to feel much better. Neg N/V, + FLATUS/ + BM, Pain controlled, Denies chest pain or SOB.  Ambulating. Tolerating regular diet. IR drain with bilious output.    PE:  /67   Pulse 95   Temp 36.1 °C (97 °F) (Temporal)   Resp 17   Ht 1.829 m (6')   Wt 86 kg (189 lb 9.5 oz)   SpO2 98%   BMI 25.71 kg/m²     I/O:   Intake/Output Summary (Last 24 hours) at 07/01/19 1633  Last data filed at 07/01/19 0827   Gross per 24 hour   Intake              370 ml   Output               80 ml   Net              290 ml         Review of Systems   Constitutional: Negative.  Negative for chills and fever.   Respiratory: Negative for shortness of breath.    Cardiovascular: Negative for chest pain.   Gastrointestinal: Negative for nausea and vomiting.        +flatus/+ stool   Genitourinary: Negative.      Physical Exam   Constitutional:  appears well-developed.   Neck: Neck supple.   Cardiovascular: Normal rate.    Pulmonary/Chest: Effort normal.   Abdominal: Soft.  exhibits no distension. Appropriate tenderness.   Incisions clean, dry, and intact  IR drain with bilious drainage, 50 cc's  Musculoskeletal: Normal range of motion.   Neurological:  alert.   Skin:  Warm and dry.   Extremities: najera, no edema    Labs:  Recent Labs      06/30/19   0401  07/01/19   0420  07/02/19   0345   WBC  12.1*  11.0*  11.2*   RBC  2.52*  2.62*  2.62*   HEMOGLOBIN  7.1*  7.3*  7.2*   HEMATOCRIT  22.3*  23.4*  23.8*   MCV  88.5  89.3  90.8   MCH  28.2  27.9  27.5   RDW  52.4*  53.1*  53.1*   PLATELETCT  587*  620*  629*   MPV  10.2  9.7  9.5   NEUTSPOLYS  80.10*  77.20*  77.60*   LYMPHOCYTES  11.00*  13.20*  12.90*   MONOCYTES  5.50  5.60  5.90   EOSINOPHILS  1.00  1.30  1.30   BASOPHILS  0.20  0.30  0.30     Recent Labs      06/30/19   0401  07/01/19   0420  07/02/19   0345   SODIUM   138  139  140   POTASSIUM  4.0  4.1  4.0   CHLORIDE  109  110  109   CO2  19*  21  22   GLUCOSE  105*  109*  102*   BUN  15  11  10         A/P:     - Regular low residue diet as tolerated.  - IS Q One hour while awake  - Ambulate.  Out of bed for all meals please  - Pain controlled.  Cont PO pain medication  - Labs noted, recheck in am  - WBC's trending down  - Change to PO Xyvox and Augmentin for 7 more day  - IR drain cultures Neg @72 hours, cont  - DVT propholaxis, ok to cont Lovenox, sequentials and ambulate  - Adequate urine output.  Cont, to monitor        Malnutrition ([facility])   Assessment & Plan    6/21 Prolonged ileus predicted following 2nd lapartomy. TPN started.  6/25 Oral diet started.  6/28 Tapering of TPN initiated.  6/29 TPN tapered off.  Continue oral alimentation.     Blood loss anemia   Assessment & Plan    Persistent critical anemia.  6/20 Transfused 2 uPRBCs.  6/28 Transfused 1 uPRBC.  7/2 Iron studies and replacement per pharmacy.  Transfuse 1 unit PRBC's for hemoglobin less than 7.     Postprocedural intraabdominal abscess   Assessment & Plan    6/27 CT imaging demonstrated a large rim-enhancing fluid collection in the anterior abdomen which measures approximately 22.8 x 3.9 x 16 cm consistent with abscess.  6/28 CT guided percutaneous drain placement.  7/1 Xyvox and Unasyn day 8, antibiotic day 13.  7/2 Change to PO Zyvox and Augmentin     Respiratory failure following trauma and surgery ([facility])   Assessment & Plan    Mechanical ventilatory support following initial surgeries.  6/21 Extubated.  Continue aggressive pulmonary care and hygiene.     Colostomy in place ([facility])- (present on admission)   Assessment & Plan    6/13 Resection and closure of colostomy.  6/20 Explored for bowel obstruction and infected hematoma.  Anastomosis revised.  6/25 Oral diet initiated.           Discussed with Patient, RN and Dr. Dony Lee, A.P.N.  [facility]  [phone]

## 2019-07-02 NOTE — PROGRESS NOTES
Ambulating multiple times in hallway w/ steady gait. Voiced no complaints. Malcolm diet, voiding w/out problems. VSS, afebrile.

## 2019-07-02 NOTE — PROGRESS NOTES
0715 assumed care. Bedside report from FLAQUITO Wyatt. Awake, resting in bed and in no distress. Bed in low position, call light w/in reach. Annika, pt's daughter, called during report. Anxious, voicing concerns. Re-assured and will update her prn.

## 2019-07-03 VITALS
OXYGEN SATURATION: 96 % | BODY MASS INDEX: 25.68 KG/M2 | RESPIRATION RATE: 17 BRPM | HEIGHT: 72 IN | DIASTOLIC BLOOD PRESSURE: 84 MMHG | HEART RATE: 72 BPM | SYSTOLIC BLOOD PRESSURE: 126 MMHG | WEIGHT: 189.6 LBS | TEMPERATURE: 99.4 F

## 2019-07-03 LAB
ALBUMIN SERPL BCP-MCNC: 2.7 G/DL (ref 3.2–4.9)
ALBUMIN/GLOB SERPL: 0.9 G/DL
ALP SERPL-CCNC: 244 U/L (ref 30–99)
ALT SERPL-CCNC: 17 U/L (ref 2–50)
ANION GAP SERPL CALC-SCNC: 9 MMOL/L (ref 0–11.9)
AST SERPL-CCNC: 12 U/L (ref 12–45)
BASOPHILS # BLD AUTO: 0.3 % (ref 0–1.8)
BASOPHILS # BLD: 0.03 K/UL (ref 0–0.12)
BILIRUB SERPL-MCNC: 2.7 MG/DL (ref 0.1–1.5)
BUN SERPL-MCNC: 9 MG/DL (ref 8–22)
CALCIUM SERPL-MCNC: 8.8 MG/DL (ref 8.5–10.5)
CHLORIDE SERPL-SCNC: 108 MMOL/L (ref 96–112)
CO2 SERPL-SCNC: 21 MMOL/L (ref 20–33)
CREAT SERPL-MCNC: 0.67 MG/DL (ref 0.5–1.4)
EOSINOPHIL # BLD AUTO: 0.18 K/UL (ref 0–0.51)
EOSINOPHIL NFR BLD: 1.6 % (ref 0–6.9)
ERYTHROCYTE [DISTWIDTH] IN BLOOD BY AUTOMATED COUNT: 53.5 FL (ref 35.9–50)
GLOBULIN SER CALC-MCNC: 3 G/DL (ref 1.9–3.5)
GLUCOSE SERPL-MCNC: 128 MG/DL (ref 65–99)
HCT VFR BLD AUTO: 25.3 % (ref 42–52)
HGB BLD-MCNC: 7.8 G/DL (ref 14–18)
IMM GRANULOCYTES # BLD AUTO: 0.18 K/UL (ref 0–0.11)
IMM GRANULOCYTES NFR BLD AUTO: 1.6 % (ref 0–0.9)
LYMPHOCYTES # BLD AUTO: 1.66 K/UL (ref 1–4.8)
LYMPHOCYTES NFR BLD: 15 % (ref 22–41)
MCH RBC QN AUTO: 28.1 PG (ref 27–33)
MCHC RBC AUTO-ENTMCNC: 30.8 G/DL (ref 33.7–35.3)
MCV RBC AUTO: 91 FL (ref 81.4–97.8)
MONOCYTES # BLD AUTO: 0.61 K/UL (ref 0–0.85)
MONOCYTES NFR BLD AUTO: 5.5 % (ref 0–13.4)
NEUTROPHILS # BLD AUTO: 8.39 K/UL (ref 1.82–7.42)
NEUTROPHILS NFR BLD: 76 % (ref 44–72)
NRBC # BLD AUTO: 0 K/UL
NRBC BLD-RTO: 0 /100 WBC
PLATELET # BLD AUTO: 626 K/UL (ref 164–446)
PMV BLD AUTO: 9.6 FL (ref 9–12.9)
POTASSIUM SERPL-SCNC: 3.8 MMOL/L (ref 3.6–5.5)
PROT SERPL-MCNC: 5.7 G/DL (ref 6–8.2)
RBC # BLD AUTO: 2.78 M/UL (ref 4.7–6.1)
SODIUM SERPL-SCNC: 138 MMOL/L (ref 135–145)
WBC # BLD AUTO: 11.1 K/UL (ref 4.8–10.8)

## 2019-07-03 PROCEDURE — 85025 COMPLETE CBC W/AUTO DIFF WBC: CPT

## 2019-07-03 PROCEDURE — 700111 HCHG RX REV CODE 636 W/ 250 OVERRIDE (IP): Performed by: NURSE PRACTITIONER

## 2019-07-03 PROCEDURE — 700102 HCHG RX REV CODE 250 W/ 637 OVERRIDE(OP): Performed by: SURGERY

## 2019-07-03 PROCEDURE — 700111 HCHG RX REV CODE 636 W/ 250 OVERRIDE (IP): Performed by: SURGERY

## 2019-07-03 PROCEDURE — 700102 HCHG RX REV CODE 250 W/ 637 OVERRIDE(OP): Performed by: NURSE PRACTITIONER

## 2019-07-03 PROCEDURE — A9270 NON-COVERED ITEM OR SERVICE: HCPCS | Performed by: NURSE PRACTITIONER

## 2019-07-03 PROCEDURE — A9270 NON-COVERED ITEM OR SERVICE: HCPCS | Performed by: SURGERY

## 2019-07-03 PROCEDURE — 80053 COMPREHEN METABOLIC PANEL: CPT

## 2019-07-03 RX ORDER — OXYCODONE HYDROCHLORIDE AND ACETAMINOPHEN 5; 325 MG/1; MG/1
1 TABLET ORAL EVERY 6 HOURS PRN
Qty: 20 TAB | Refills: 0 | Status: SHIPPED | OUTPATIENT
Start: 2019-07-03 | End: 2019-07-08

## 2019-07-03 RX ORDER — OXYCODONE HYDROCHLORIDE AND ACETAMINOPHEN 5; 325 MG/1; MG/1
1 TABLET ORAL EVERY 6 HOURS PRN
Qty: 20 TAB | Refills: 0 | Status: SHIPPED | OUTPATIENT
Start: 2019-07-03 | End: 2019-07-03

## 2019-07-03 RX ORDER — AMOXICILLIN AND CLAVULANATE POTASSIUM 875; 125 MG/1; MG/1
1 TABLET, FILM COATED ORAL EVERY 12 HOURS
Qty: 12 TAB | Refills: 0 | Status: SHIPPED | OUTPATIENT
Start: 2019-07-03 | End: 2019-07-09

## 2019-07-03 RX ORDER — LINEZOLID 600 MG/1
600 TABLET, FILM COATED ORAL EVERY 12 HOURS
Qty: 12 TAB | Refills: 0 | Status: SHIPPED | OUTPATIENT
Start: 2019-07-03 | End: 2019-07-03

## 2019-07-03 RX ORDER — LINEZOLID 600 MG/1
600 TABLET, FILM COATED ORAL EVERY 12 HOURS
Qty: 12 TAB | Refills: 0 | Status: SHIPPED | OUTPATIENT
Start: 2019-07-03 | End: 2019-07-09

## 2019-07-03 RX ORDER — AMOXICILLIN AND CLAVULANATE POTASSIUM 875; 125 MG/1; MG/1
1 TABLET, FILM COATED ORAL EVERY 12 HOURS
Qty: 12 TAB | Refills: 0 | Status: SHIPPED | OUTPATIENT
Start: 2019-07-03 | End: 2019-07-03

## 2019-07-03 RX ADMIN — AMOXICILLIN AND CLAVULANATE POTASSIUM 1 TABLET: 875; 125 TABLET, FILM COATED ORAL at 05:09

## 2019-07-03 RX ADMIN — LINEZOLID 600 MG: 600 TABLET, FILM COATED ORAL at 05:09

## 2019-07-03 RX ADMIN — ENOXAPARIN SODIUM 40 MG: 100 INJECTION SUBCUTANEOUS at 05:08

## 2019-07-03 RX ADMIN — OXYCODONE HYDROCHLORIDE 5 MG: 5 TABLET ORAL at 05:09

## 2019-07-03 RX ADMIN — INDOMETHACIN 50 MG: 50 CAPSULE ORAL at 08:11

## 2019-07-03 RX ADMIN — INDOMETHACIN 50 MG: 50 CAPSULE ORAL at 11:04

## 2019-07-03 RX ADMIN — OXYCODONE HYDROCHLORIDE 5 MG: 5 TABLET ORAL at 17:14

## 2019-07-03 RX ADMIN — LINEZOLID 600 MG: 600 TABLET, FILM COATED ORAL at 16:54

## 2019-07-03 RX ADMIN — INDOMETHACIN 50 MG: 50 CAPSULE ORAL at 16:54

## 2019-07-03 RX ADMIN — OXYCODONE HYDROCHLORIDE 5 MG: 5 TABLET ORAL at 11:08

## 2019-07-03 RX ADMIN — METOCLOPRAMIDE 10 MG: 5 INJECTION, SOLUTION INTRAMUSCULAR; INTRAVENOUS at 05:09

## 2019-07-03 RX ADMIN — AMOXICILLIN AND CLAVULANATE POTASSIUM 1 TABLET: 875; 125 TABLET, FILM COATED ORAL at 16:54

## 2019-07-03 NOTE — DISCHARGE INSTRUCTIONS
Discharge Instructions    Discharged to home by car with relative. Discharged via wheelchair, hospital escort: Yes.  Special equipment needed: Not Applicable    Be sure to schedule a follow-up appointment with your primary care doctor or any specialists as instructed.     Discharge Plan:   Influenza Vaccine Indication: Patient Refuses    I understand that a diet low in cholesterol, fat, and sodium is recommended for good health. Unless I have been given specific instructions below for another diet, I accept this instruction as my diet prescription.   Other diet: regular    Special Instructions: None    · Is patient discharged on Warfarin / Coumadin?   No     Depression / Suicide Risk    As you are discharged from this [facility] facility, it is important to learn how to keep safe from harming yourself.    Recognize the warning signs:  · Abrupt changes in personality, positive or negative- including increase in energy   · Giving away possessions  · Change in eating patterns- significant weight changes-  positive or negative  · Change in sleeping patterns- unable to sleep or sleeping all the time   · Unwillingness or inability to communicate  · Depression  · Unusual sadness, discouragement and loneliness  · Talk of wanting to die  · Neglect of personal appearance   · Rebelliousness- reckless behavior  · Withdrawal from people/activities they love  · Confusion- inability to concentrate     If you or a loved one observes any of these behaviors or has concerns about self-harm, here's what you can do:  · Talk about it- your feelings and reasons for harming yourself  · Remove any means that you might use to hurt yourself (examples: pills, rope, extension cords, firearm)  · Get professional help from the community (Mental Health, Substance Abuse, psychological counseling)  · Do not be alone:Call your Safe Contact- someone whom you trust who will be there for you.  · Call your local CRISIS HOTLINE 479-6212 or  167.224.4456  · Call your local Children's Mobile Crisis Response Team [place] (671) 162-8311 or www.Where I've Been  · Call the toll free National Suicide Prevention Hotlines   · National Suicide Prevention Lifeline 063-275-VVES (6864)  · [place] Line Network 800-SUICIDE (423-3557)      End Colostomy Reversal  Introduction  An end colostomy reversal is surgery that reverses an end colostomy. In this reversal procedure, the large intestine is disconnected from the opening in the abdomen (stoma). Then, it is reconnected to the rest of the large intestine inside the body. After this surgery, a stoma and colostomy bag are no longer needed. Stool (feces) can leave your body through the rectum, as it did before you had an end colostomy.  Tell a health care provider about:  · Any allergies you have.  · All medicines you are taking, including vitamins, herbs, eye drops, creams, and over-the-counter medicines.  · Any problems you or family members have had with anesthetic medicines.  · Any blood disorders you have.  · Any surgeries you have had.  · Any medical conditions you have.  · Whether you are pregnant or may be pregnant.  What are the risks?  Generally, this is a safe procedure. However, problems may occur, including:  · Infection.  · Bleeding.  · Allergic reactions to medicines.  · Damage to other structures or organs.  · A temporary condition in which the intestines stop moving and working correctly (ileus). This usually goes away in 3-7 days.  · Leaking at the area of the intestine (anastomotic leak) where it was reconnected.  · A collection of pus (abscess) in the abdomen or pelvis.  · Intestinal blockage.  · Narrowing of the intestine (stricture) at the place where it was reconnected.  · Urinary and sexual dysfunction.  What happens before the procedure?  · Follow instructions from your health care provider about eating or drinking restrictions.  · Ask your health care provider about:  ¨ Changing or  stopping your regular medicines. This is especially important if you are taking diabetes medicines or blood thinners.  ¨ Taking medicines such as aspirin and ibuprofen. These medicines can thin your blood. Do not take these medicines before your procedure if your health care provider instructs you not to.  · Do not use any tobacco products, such as cigarettes, chewing tobacco, and e-cigarettes. If you need help quitting, ask your health care provider.  · Plan to have someone take you home after the procedure.  · You may have an exam or testing.  · Ask your health care provider how your surgical site will be marked or identified.  · You may be given antibiotic medicine to help prevent infection.  What happens during the procedure?  · To reduce your risk of infection:  ¨ Your health care team will wash or sanitize their hands.  ¨ Your skin will be washed with soap.  · An IV tube will be inserted into one of your veins.  · You may be given a medicine to help you relax (sedative).  · You will be given a medicine to make you fall asleep (general anesthetic).  · An incision will be made in your abdomen at the site of the stoma.  · The large intestine will be disconnected from the abdomen at the site of the stoma.  · The surgeon will use stitches (sutures) or staples to reconnect the two ends of the intestine that were  during the end colostomy.  · The incision will be closed with sutures, skin glue, or adhesive strips. It may be covered with bandages (dressings).  The procedure may vary among health care providers and hospitals.  What happens after the procedure?  · Your blood pressure, heart rate, breathing rate, and blood oxygen level will be monitored often until the medicines you were given have worn off.  · You will be given pain medicine as needed.  · You will slowly increase your diet and movement as told by your health care provider.  · Do not drive for 24 hours if you received a sedative.  This  information is not intended to replace advice given to you by your health care provider. Make sure you discuss any questions you have with your health care provider.  Document Released: 03/11/2013 Document Revised: 05/25/2017 Document Reviewed: 08/30/2016  © 2017 Doc    Bulb Drain Home Care  A bulb drain consists of a thin rubber tube and a soft, round bulb that creates a gentle suction. The rubber tube is placed in the area where you had surgery. A bulb is attached to the end of the tube that is outside the body. The bulb drain removes excess fluid that normally builds up in a surgical wound after surgery. The color and amount of fluid will vary. Immediately after surgery, the fluid is bright red and is a little thicker than water. It may gradually change to a yellow or pink color and become more thin and water-like. When the amount decreases to about 1 or 2 tbsp in 24 hours, your health care provider will usually remove it.  DAILY CARE  · Keep the bulb flat (compressed) at all times, except while emptying it. The flatness creates suction. You can flatten the bulb by squeezing it firmly in the middle and then closing the cap.  · Keep sites where the tube enters the skin dry and covered with a bandage (dressing).  · Secure the tube 1-2 in (2.5-5.1 cm) below the insertion sites to keep it from pulling on your stitches. The tube is stitched in place and will not slip out.  · Secure the bulb as directed by your health care provider.  · For the first 3 days after surgery, there usually is more fluid in the bulb. Empty the bulb whenever it becomes half full because the bulb does not create enough suction if it is too full. The bulb could also overflow. Write down how much fluid you remove each time you empty your drain. Add up the amount removed in 24 hours.  · Empty the bulb at the same time every day once the amount of fluid decreases and you only need to empty it once a day. Write down the amounts and the 24-hour  totals to give to your health care provider. This helps your health care provider know when the tubes can be removed.  EMPTYING THE BULB DRAIN  Before emptying the bulb, get a measuring cup, a piece of paper and a pen, and wash your hands.  · Gently run your fingers down the tube (stripping) to empty any drainage from the tubing into the bulb. This may need to be done several times a day to clear the tubing of clots and tissue.  · Open the bulb cap to release suction, which causes it to inflate. Do not touch the inside of the cap.  · Gently run your fingers down the tube (stripping) to empty any drainage from the tubing into the bulb.  · Hold the cap out of the way, and pour fluid into the measuring cup.    · Squeeze the bulb to provide suction.   · Replace the cap.    · Check the tape that holds the tube to your skin. If it is becoming loose, you can remove the loose piece of tape and apply a new one. Then, pin the bulb to your shirt.    · Write down the amount of fluid you emptied out. Write down the date and each time you emptied your bulb drain. (If there are 2 bulbs, note the amount of drainage from each bulb and keep the totals separate. Your health care provider will want to know the total amounts for each drain and which tube is draining more.)    · Flush the fluid down the toilet and wash your hands.    · Call your health care provider once you have less than 2 tbsp of fluid collecting in the bulb drain every 24 hours.  If there is drainage around the tube site, change dressings and keep the area dry. Cleanse around tube with sterile saline and place dry gauze around site. This gauze should be changed when it is soiled. If it stays clean and unsoiled, it should still be changed daily.   SEEK MEDICAL CARE IF:  · Your drainage has a bad smell or is cloudy.    · You have a fever.    · Your drainage is increasing instead of decreasing.    · Your tube fell out.    · You have redness or swelling around the tube  site.    · You have drainage from a surgical wound.    · Your bulb drain will not stay flat after you empty it.    MAKE SURE YOU:   · Understand these instructions.  · Will watch your condition.  · Will get help right away if you are not doing well or get worse.     This information is not intended to replace advice given to you by your health care provider. Make sure you discuss any questions you have with your health care provider.     Document Released: 12/15/2001 Document Revised: 01/08/2016 Document Reviewed: 05/22/2013  [employer] Interactive Patient Education ©2016 [employer]

## 2019-07-03 NOTE — PROGRESS NOTES
Bedside report received.  Assessment complete.  A&O x 4. Patient calls appropriately.  Patient mobilizes with one assist. Bed alarm n/a.   Patient has 0/10 pain. Declines at this time.  Denies N&V. Tolerating regular diet.  Surgical incision open to air clean dry and intact.  + void, + flatus, + BM.  Patient denies SOB.  SCD's off, patient is walking frequently .  Review plan with of care with patient. Call light and personal belongings with in reach. Hourly rounding in place. All needs met at this time.

## 2019-07-03 NOTE — PROGRESS NOTES
Assumed care of pt at shift change, report received from day shift RN  /81   Pulse 76   Temp 36.6 °C (97.9 °F) (Temporal)   Resp 18   Ht 1.829 m (6')   Wt 86 kg (189 lb 9.5 oz)   SpO2 96%   BMI 25.71 kg/m²     Midline incision INES with staples, CDI  IR drain dressing CDI  18G LAC PIC in place, saline locked  L subclavian triple lumen in place, saline locked  Pt has been ambulating, tolerates well   Last BM tonight 7/2, loose  Urinating fine in toilet  No complaints of pain this PM  Call light within reach, pt calls for assistance

## 2019-07-03 NOTE — PROGRESS NOTES
Removed 14 staples total between both abdominal wounds, benzoin and steri strips applied. Redressed the JOHAN site. Will do drain education once daughter arrives tonight after work.

## 2019-07-03 NOTE — DISCHARGE SUMMARY
Discharge Summary    CHIEF COMPLAINT ON ADMISSION  Colostomy    Reason for Admission  Attention colostomy     Admission Date  6/13/2019    CODE STATUS  Full Code    HPI & HOSPITAL COURSE  This is a 59 y.o. male here with colostomy in place and elects surgical colostomy closure.  The patient post operative coarse was complicated by an intraabdominal hematoma that got infected. He returned to the operating room evacuation and washout of the hematome and revision of the coloproctostomy .  At the time of discharge he was afebrile, tolerating a regular diet and voiding normally.  His general exam is unremarkable.  His abdominal incisions are healing satisfactorily.  Patient has been counseled on normal expectations of an uncomplicated post operative coarse.  He was discharged on a regular diet.  He has restarted his pre-admission medications.  His discharge medications are as below.   He is to follow up with Dr. Napoles in one week and prn.  Discharge instructions and drain care instructions were given. Precautions and limitations were reviewed.     The patient was counseled regarding the benefits of narcotics for post-operative pain in the short term period. The patient was made aware of the alternatives, including heating pads, ice, and NSAID medication.    The risks of addiction, overdose, respiratory depression, and risks during pregnancy (if applicable) along with warning signs of addiction, were discussed.  We discussed taking the medications as prescribed and how they can interact with other medications the patient is currently taking.     The patient was notified not to share the medications with others and understands that these medications are intended to be used only in the short term for post-operative pain.    I reviewed the NarcRx  program, and the patient was deemed not to be at high risk for abuse, and had no concurrent prescriptions.    The patient was given a chance to ask questions, and all questions  were answered. Discussion was undertaken with Layman's terms. The patient demonstrated adequate understanding. Consent was given and signed preoperatively in clear state of mind.    The patient has stated understanding and agrees with this plan of care.                 Pathological exam:             A. Ostomy:         Portion of benign skin and adjacent benign colonic mucosa with          areas of reactive changes and mild chronic inflammation with          focal activity and foreign body giant cell reaction, consistent          with ostomy site.         No malignancy identified.  B. Rectal stump:         Small portion of benign bowel demonstrates nine colonic mucosa          mild chronic nonspecific inflammation, scattered lymphoid          follicles and reactive changes.         No malignancy identified.         Therefore, he is discharged in good and stable condition to home with close outpatient follow-up.      Discharge Date  7/3/2019      FOLLOW UP ITEMS POST DISCHARGE  Drain care items  Oral antibitoics    DISCHARGE DIAGNOSES  Malnutrition (HCC)   Assessment & Plan    6/21 Prolonged ileus predicted following 2nd lapartomy. TPN started.  6/25 Oral diet started.  6/28 Tapering of TPN initiated.  6/29 TPN tapered off.  Continue oral alimentation.     Blood loss anemia   Assessment & Plan    Persistent critical anemia.  6/20 Transfused 2 uPRBCs.  6/28 Transfused 1 uPRBC.  7/2 Iron studies and replacement per pharmacy.  Transfuse 1 unit PRBC's for hemoglobin less than 7.     Postprocedural intraabdominal abscess   Assessment & Plan    6/27 CT imaging demonstrated a large rim-enhancing fluid collection in the anterior abdomen which measures approximately 22.8 x 3.9 x 16 cm consistent with abscess.  6/28 CT guided percutaneous drain placement.  7/1 Xyvox and Unasyn day 8, antibiotic day 13.  7/2 Change to PO Zyvox and Augmentin     Respiratory failure following trauma and surgery (HCC)   Assessment & Plan     Mechanical ventilatory support following initial surgeries.  6/21 Extubated.  Continue aggressive pulmonary care and hygiene.     Colostomy in place (HCC)- (present on admission)   Assessment & Plan    6/13 Resection and closure of colostomy.  6/20 Explored for bowel obstruction and infected hematoma.  Anastomosis revised.  6/25 Oral diet initiated.           FOLLOW UP  Future Appointments  Date Time Provider Department Center   9/12/2019 9:00 AM Marcus Ordoñez M.D. [facility] None     Dianna Napoles M.D.  [address]  [address]  [facility] [address]  [phone]    On 7/10/2019  and prn      MEDICATIONS ON DISCHARGE     Medication List      START taking these medications      Instructions   amoxicillin-clavulanate 875-125 MG Tabs  Commonly known as:  AUGMENTIN   Take 1 Tab by mouth every 12 hours for 6 days.  Dose:  1 Tab     linezolid 600 MG Tabs  Commonly known as:  ZYVOX   Take 1 Tab by mouth every 12 hours for 6 days.  Dose:  600 mg     oxyCODONE-acetaminophen 5-325 MG Tabs  Commonly known as:  PERCOCET   Take 1 Tab by mouth every 6 hours as needed for up to 5 days.  Dose:  1 Tab        CONTINUE taking these medications      Instructions   indomethacin 50 MG Caps  Commonly known as:  INDOCIN   Take 50 mg by mouth every 6 hours.  Dose:  50 mg            Allergies  Allergies   Allergen Reactions   • Ativan        DIET  Orders Placed This Encounter   Procedures   • Diet Order Regular     Standing Status:   Standing     Number of Occurrences:   1     Order Specific Question:   Diet:     Answer:   Regular [1]     Order Specific Question:   Miscellaneous modifications:     Answer:   6 Small Meals [4]       ACTIVITY  As tolerated.  Weight bearing as tolerated    CONSULTATIONS  [facility] critical care team    PROCEDURES    6/13/19 Colostomy closure.  6/20/19 Exploratory celiotomy, washout of intra-abdominal   hematoma and revision of colocolostomy, and central line placement with a   triple lumen catheter, 7-French catheter  in the left subclavian vein;   mobilization of the splenic flexure, and a rigid sigmoidoscopy  6/28/19 IR drain placement      LABORATORY  Lab Results   Component Value Date    SODIUM 138 07/03/2019    POTASSIUM 3.8 07/03/2019    CHLORIDE 108 07/03/2019    CO2 21 07/03/2019    GLUCOSE 128 (H) 07/03/2019    BUN 9 07/03/2019    CREATININE 0.67 07/03/2019        Lab Results   Component Value Date    WBC 11.1 (H) 07/03/2019    HEMOGLOBIN 7.8 (L) 07/03/2019    HEMATOCRIT 25.3 (L) 07/03/2019    PLATELETCT 626 (H) 07/03/2019        Total time of the discharge process exceeds 45 minutes.

## 2019-07-03 NOTE — PROGRESS NOTES
Surgical Progress Note:    POD #12 S/P Exploratory celiotomy, washout of intra-abdominal hematoma and revision of colocolostomy,  POD #5 IR drain placement  Continues to feel much better. Neg N/V, + FLATUS/ + BM, Pain controlled, Denies chest pain or SOB.  Ambulating. Tolerating regular diet. IR drain with bilious output.    PE:  /77   Pulse 81   Temp 36.8 °C (98.2 °F) (Temporal)   Resp 17   Ht 1.829 m (6')   Wt 86 kg (189 lb 9.5 oz)   SpO2 95%   BMI 25.71 kg/m²     I/O:   Intake/Output Summary (Last 24 hours) at 07/01/19 1633  Last data filed at 07/01/19 0827   Gross per 24 hour   Intake              370 ml   Output               80 ml   Net              290 ml         Review of Systems   Constitutional: Negative.  Negative for chills and fever.   Respiratory: Negative for shortness of breath.    Cardiovascular: Negative for chest pain.   Gastrointestinal: Negative for nausea and vomiting.        +flatus/+ stool   Genitourinary: Negative.      Physical Exam   Constitutional:  appears well-developed.   Neck: Neck supple.   Cardiovascular: Normal rate.    Pulmonary/Chest: Effort normal.   Abdominal: Soft.  exhibits no distension. Appropriate tenderness.   Incisions clean, dry, and intact  IR drain with bilious drainage, 40 cc's  Musculoskeletal: Normal range of motion.   Neurological:  alert.   Skin:  Warm and dry.   Extremities: najera, no edema    Labs:  Recent Labs      07/01/19   0420  07/02/19   0345  07/03/19   0521   WBC  11.0*  11.2*  11.1*   RBC  2.62*  2.62*  2.78*   HEMOGLOBIN  7.3*  7.2*  7.8*   HEMATOCRIT  23.4*  23.8*  25.3*   MCV  89.3  90.8  91.0   MCH  27.9  27.5  28.1   RDW  53.1*  53.1*  53.5*   PLATELETCT  620*  629*  626*   MPV  9.7  9.5  9.6   NEUTSPOLYS  77.20*  77.60*  76.00*   LYMPHOCYTES  13.20*  12.90*  15.00*   MONOCYTES  5.60  5.90  5.50   EOSINOPHILS  1.30  1.30  1.60   BASOPHILS  0.30  0.30  0.30     Recent Labs      07/01/19   0420  07/02/19   0345  07/03/19   0521   SODIUM   139  140  138   POTASSIUM  4.1  4.0  3.8   CHLORIDE  110  109  108   CO2  21  22  21   GLUCOSE  109*  102*  128*   BUN  11  10  9         A/P:     - Regular low residue diet as tolerated.  - IS Q One hour while awake  - Ambulate.  Out of bed for all meals please  - Pain controlled.  Cont PO pain medication  - Labs noted, recheck in am  - WBC's trending down  - Change to PO Xyvox and Augmentin for 7 more day  - IR drain cultures Neg @72 hours, cont  - DVT propholaxis, ok to cont Lovenox, sequentials and ambulate  - Adequate urine output.  Cont, to monitor  - Will D/C home on PO antibitoics.  Pt to follow up with Dr. Napoles Wed 7/10 and prn        Malnutrition ([facility])   Assessment & Plan    6/21 Prolonged ileus predicted following 2nd lapartomy. TPN started.  6/25 Oral diet started.  6/28 Tapering of TPN initiated.  6/29 TPN tapered off.  Continue oral alimentation.     Blood loss anemia   Assessment & Plan    Persistent critical anemia.  6/20 Transfused 2 uPRBCs.  6/28 Transfused 1 uPRBC.  7/2 Iron studies and replacement per pharmacy.  Transfuse 1 unit PRBC's for hemoglobin less than 7.     Postprocedural intraabdominal abscess   Assessment & Plan    6/27 CT imaging demonstrated a large rim-enhancing fluid collection in the anterior abdomen which measures approximately 22.8 x 3.9 x 16 cm consistent with abscess.  6/28 CT guided percutaneous drain placement.  7/1 Xyvox and Unasyn day 8, antibiotic day 13.  7/2 Change to PO Zyvox and Augmentin     Respiratory failure following trauma and surgery ([facility])   Assessment & Plan    Mechanical ventilatory support following initial surgeries.  6/21 Extubated.  Continue aggressive pulmonary care and hygiene.     Colostomy in place ([facility])- (present on admission)   Assessment & Plan    6/13 Resection and closure of colostomy.  6/20 Explored for bowel obstruction and infected hematoma.  Anastomosis revised.  6/25 Oral diet initiated.           Discussed with Patient, RN and   TRAVIS Phipps  [facility]  [phone]

## 2019-07-03 NOTE — CARE PLAN
Problem: Communication  Goal: The ability to communicate needs accurately and effectively will improve  Outcome: PROGRESSING AS EXPECTED  Encouraged pt to voice needs and concerns      Problem: Safety  Goal: Will remain free from injury  Outcome: PROGRESSING AS EXPECTED  Room free of clutter, call light within reach, pt calls for assistance

## 2019-07-03 NOTE — PROGRESS NOTES
1800 Pt's 3rd child @ bedside. Requested to be updated w/ plan of care. Updated w/in this RN's scope of practice.

## 2019-07-04 NOTE — PROGRESS NOTES
Discharge instructions discussed with patient and daughter. IV and Central Line removed. IR drain care discussed with patient and daughter. No further questions at this time. Prescriptions with patient all belongings gathered. Escorted patient downstairs by wheelchair.

## 2019-07-10 NOTE — DOCUMENTATION QUERY
[facility]                                                                       Query Response Note      PATIENT:               ELIZABETH GOMEZ  ACCT #:                  [id number]  MRN:                     [id number]  :                      1960  ADMIT DATE:       2019 11:48 AM  DISCH DATE:        7/3/2019 6:02 PM  RESPONDING  PROVIDER #:        [id number]           QUERY TEXT:    Anemia due to unspecified blood loss is documented in the medical record.  Please specify the acuity of the blood loss.     NOTE:  If an appropriate response is not listed below, please respond with a new note.      The patient's Clinical Indicators include:  Persistent critical anemia  PRBC transfusions  Options provided:   -- Acute blood loss anemia   -- Chronic blood loss   -- Unable to determine      Query created by: Kady Hull on 7/10/2019 5:58 AM    RESPONSE TEXT:    Acute blood loss anemia          Electronically signed by:  ELOISE THOMPSON 7/10/2019 12:21 PM

## 2020-08-28 ENCOUNTER — PRE-ADMISSION TESTING (OUTPATIENT)
Dept: ADMISSIONS | Facility: MEDICAL CENTER | Age: 60
End: 2020-08-28
Attending: SURGERY
Payer: COMMERCIAL

## 2020-08-28 DIAGNOSIS — Z01.812 PRE-OPERATIVE LABORATORY EXAMINATION: ICD-10-CM

## 2020-08-28 LAB
COVID ORDER STATUS COVID19: NORMAL
SARS-COV-2 RNA RESP QL NAA+PROBE: NOTDETECTED
SPECIMEN SOURCE: NORMAL

## 2020-08-28 PROCEDURE — U0003 INFECTIOUS AGENT DETECTION BY NUCLEIC ACID (DNA OR RNA); SEVERE ACUTE RESPIRATORY SYNDROME CORONAVIRUS 2 (SARS-COV-2) (CORONAVIRUS DISEASE [COVID-19]), AMPLIFIED PROBE TECHNIQUE, MAKING USE OF HIGH THROUGHPUT TECHNOLOGIES AS DESCRIBED BY CMS-2020-01-R: HCPCS

## 2020-08-31 ENCOUNTER — PRE-ADMISSION TESTING (OUTPATIENT)
Dept: ADMISSIONS | Facility: MEDICAL CENTER | Age: 60
End: 2020-08-31
Attending: SURGERY
Payer: COMMERCIAL

## 2020-08-31 ASSESSMENT — FIBROSIS 4 INDEX: FIB4 SCORE: 0.28

## 2020-09-03 ENCOUNTER — ANESTHESIA (OUTPATIENT)
Dept: SURGERY | Facility: MEDICAL CENTER | Age: 60
End: 2020-09-03
Payer: COMMERCIAL

## 2020-09-03 ENCOUNTER — HOSPITAL ENCOUNTER (OUTPATIENT)
Facility: MEDICAL CENTER | Age: 60
End: 2020-09-03
Attending: SURGERY | Admitting: SURGERY
Payer: COMMERCIAL

## 2020-09-03 ENCOUNTER — ANESTHESIA EVENT (OUTPATIENT)
Dept: SURGERY | Facility: MEDICAL CENTER | Age: 60
End: 2020-09-03
Payer: COMMERCIAL

## 2020-09-03 VITALS
SYSTOLIC BLOOD PRESSURE: 140 MMHG | HEART RATE: 54 BPM | WEIGHT: 178.13 LBS | DIASTOLIC BLOOD PRESSURE: 91 MMHG | BODY MASS INDEX: 24.13 KG/M2 | HEIGHT: 72 IN | OXYGEN SATURATION: 99 % | TEMPERATURE: 97.3 F | RESPIRATION RATE: 16 BRPM

## 2020-09-03 PROCEDURE — 700102 HCHG RX REV CODE 250 W/ 637 OVERRIDE(OP): Performed by: SURGERY

## 2020-09-03 PROCEDURE — 160046 HCHG PACU - 1ST 60 MINS PHASE II: Performed by: SURGERY

## 2020-09-03 PROCEDURE — 160035 HCHG PACU - 1ST 60 MINS PHASE I: Performed by: SURGERY

## 2020-09-03 PROCEDURE — 700105 HCHG RX REV CODE 258: Performed by: SURGERY

## 2020-09-03 PROCEDURE — 160048 HCHG OR STATISTICAL LEVEL 1-5: Performed by: SURGERY

## 2020-09-03 PROCEDURE — 160009 HCHG ANES TIME/MIN: Performed by: SURGERY

## 2020-09-03 PROCEDURE — 700111 HCHG RX REV CODE 636 W/ 250 OVERRIDE (IP): Performed by: SURGERY

## 2020-09-03 PROCEDURE — 160027 HCHG SURGERY MINUTES - 1ST 30 MINS LEVEL 2: Performed by: SURGERY

## 2020-09-03 PROCEDURE — 700102 HCHG RX REV CODE 250 W/ 637 OVERRIDE(OP): Performed by: ANESTHESIOLOGY

## 2020-09-03 PROCEDURE — 160038 HCHG SURGERY MINUTES - EA ADDL 1 MIN LEVEL 2: Performed by: SURGERY

## 2020-09-03 PROCEDURE — 700111 HCHG RX REV CODE 636 W/ 250 OVERRIDE (IP): Performed by: ANESTHESIOLOGY

## 2020-09-03 PROCEDURE — 160036 HCHG PACU - EA ADDL 30 MINS PHASE I: Performed by: SURGERY

## 2020-09-03 PROCEDURE — C1781 MESH (IMPLANTABLE): HCPCS | Performed by: SURGERY

## 2020-09-03 PROCEDURE — 501838 HCHG SUTURE GENERAL: Performed by: SURGERY

## 2020-09-03 PROCEDURE — A9270 NON-COVERED ITEM OR SERVICE: HCPCS | Performed by: SURGERY

## 2020-09-03 PROCEDURE — A9270 NON-COVERED ITEM OR SERVICE: HCPCS | Performed by: ANESTHESIOLOGY

## 2020-09-03 PROCEDURE — 160025 RECOVERY II MINUTES (STATS): Performed by: SURGERY

## 2020-09-03 PROCEDURE — 700101 HCHG RX REV CODE 250: Performed by: ANESTHESIOLOGY

## 2020-09-03 PROCEDURE — 160002 HCHG RECOVERY MINUTES (STAT): Performed by: SURGERY

## 2020-09-03 DEVICE — MESH VENTRALEX ST W/STRAP - 6.4CM MEDIUM (1EA/CA): Type: IMPLANTABLE DEVICE | Site: ABDOMEN | Status: FUNCTIONAL

## 2020-09-03 RX ORDER — OXYCODONE HYDROCHLORIDE AND ACETAMINOPHEN 5; 325 MG/1; MG/1
1 TABLET ORAL
Status: COMPLETED | OUTPATIENT
Start: 2020-09-03 | End: 2020-09-03

## 2020-09-03 RX ORDER — BUPIVACAINE HYDROCHLORIDE 2.5 MG/ML
INJECTION, SOLUTION EPIDURAL; INFILTRATION; INTRACAUDAL
Status: DISCONTINUED | OUTPATIENT
Start: 2020-09-03 | End: 2020-09-03 | Stop reason: HOSPADM

## 2020-09-03 RX ORDER — HALOPERIDOL 5 MG/ML
1 INJECTION INTRAMUSCULAR
Status: DISCONTINUED | OUTPATIENT
Start: 2020-09-03 | End: 2020-09-03 | Stop reason: HOSPADM

## 2020-09-03 RX ORDER — ONDANSETRON 2 MG/ML
INJECTION INTRAMUSCULAR; INTRAVENOUS PRN
Status: DISCONTINUED | OUTPATIENT
Start: 2020-09-03 | End: 2020-09-03 | Stop reason: SURG

## 2020-09-03 RX ORDER — DIPHENHYDRAMINE HYDROCHLORIDE 50 MG/ML
12.5 INJECTION INTRAMUSCULAR; INTRAVENOUS
Status: DISCONTINUED | OUTPATIENT
Start: 2020-09-03 | End: 2020-09-03 | Stop reason: HOSPADM

## 2020-09-03 RX ORDER — SODIUM CHLORIDE, SODIUM LACTATE, POTASSIUM CHLORIDE, CALCIUM CHLORIDE 600; 310; 30; 20 MG/100ML; MG/100ML; MG/100ML; MG/100ML
INJECTION, SOLUTION INTRAVENOUS CONTINUOUS
Status: DISCONTINUED | OUTPATIENT
Start: 2020-09-03 | End: 2020-09-03 | Stop reason: HOSPADM

## 2020-09-03 RX ORDER — ONDANSETRON 2 MG/ML
4 INJECTION INTRAMUSCULAR; INTRAVENOUS
Status: DISCONTINUED | OUTPATIENT
Start: 2020-09-03 | End: 2020-09-03 | Stop reason: HOSPADM

## 2020-09-03 RX ORDER — SODIUM CHLORIDE 9 MG/ML
INJECTION, SOLUTION INTRAVENOUS CONTINUOUS
Status: DISCONTINUED | OUTPATIENT
Start: 2020-09-03 | End: 2020-09-03 | Stop reason: HOSPADM

## 2020-09-03 RX ORDER — OXYCODONE HYDROCHLORIDE 5 MG/1
5 TABLET ORAL EVERY 4 HOURS PRN
Status: DISCONTINUED | OUTPATIENT
Start: 2020-09-03 | End: 2020-09-03 | Stop reason: HOSPADM

## 2020-09-03 RX ORDER — OXYCODONE HYDROCHLORIDE AND ACETAMINOPHEN 5; 325 MG/1; MG/1
2 TABLET ORAL
Status: COMPLETED | OUTPATIENT
Start: 2020-09-03 | End: 2020-09-03

## 2020-09-03 RX ORDER — KETOROLAC TROMETHAMINE 30 MG/ML
INJECTION, SOLUTION INTRAMUSCULAR; INTRAVENOUS PRN
Status: DISCONTINUED | OUTPATIENT
Start: 2020-09-03 | End: 2020-09-03 | Stop reason: SURG

## 2020-09-03 RX ORDER — ROCURONIUM BROMIDE 10 MG/ML
INJECTION, SOLUTION INTRAVENOUS PRN
Status: DISCONTINUED | OUTPATIENT
Start: 2020-09-03 | End: 2020-09-03 | Stop reason: SURG

## 2020-09-03 RX ORDER — CEFAZOLIN SODIUM 1 G/3ML
INJECTION, POWDER, FOR SOLUTION INTRAMUSCULAR; INTRAVENOUS PRN
Status: DISCONTINUED | OUTPATIENT
Start: 2020-09-03 | End: 2020-09-03 | Stop reason: SURG

## 2020-09-03 RX ADMIN — ONDANSETRON 4 MG: 2 INJECTION INTRAMUSCULAR; INTRAVENOUS at 11:19

## 2020-09-03 RX ADMIN — SUGAMMADEX 200 MG: 100 INJECTION, SOLUTION INTRAVENOUS at 11:32

## 2020-09-03 RX ADMIN — MIDAZOLAM 2 MG: 1 INJECTION INTRAMUSCULAR; INTRAVENOUS at 11:10

## 2020-09-03 RX ADMIN — KETOROLAC TROMETHAMINE 30 MG: 30 INJECTION, SOLUTION INTRAMUSCULAR at 11:19

## 2020-09-03 RX ADMIN — ROCURONIUM BROMIDE 50 MG: 10 INJECTION, SOLUTION INTRAVENOUS at 11:10

## 2020-09-03 RX ADMIN — FENTANYL CITRATE 100 MCG: 50 INJECTION INTRAMUSCULAR; INTRAVENOUS at 11:30

## 2020-09-03 RX ADMIN — OXYCODONE HYDROCHLORIDE AND ACETAMINOPHEN 1 TABLET: 5; 325 TABLET ORAL at 12:39

## 2020-09-03 RX ADMIN — ONDANSETRON 4 MG: 2 INJECTION INTRAMUSCULAR; INTRAVENOUS at 11:33

## 2020-09-03 RX ADMIN — PROPOFOL 150 MG: 10 INJECTION, EMULSION INTRAVENOUS at 11:10

## 2020-09-03 RX ADMIN — CEFAZOLIN 2 G: 330 INJECTION, POWDER, FOR SOLUTION INTRAMUSCULAR; INTRAVENOUS at 11:11

## 2020-09-03 RX ADMIN — PROPOFOL 100 MG: 10 INJECTION, EMULSION INTRAVENOUS at 11:30

## 2020-09-03 RX ADMIN — SODIUM CHLORIDE, POTASSIUM CHLORIDE, SODIUM LACTATE AND CALCIUM CHLORIDE: 600; 310; 30; 20 INJECTION, SOLUTION INTRAVENOUS at 11:33

## 2020-09-03 RX ADMIN — SODIUM CHLORIDE, POTASSIUM CHLORIDE, SODIUM LACTATE AND CALCIUM CHLORIDE: 600; 310; 30; 20 INJECTION, SOLUTION INTRAVENOUS at 10:45

## 2020-09-03 RX ADMIN — FENTANYL CITRATE 100 MCG: 50 INJECTION INTRAMUSCULAR; INTRAVENOUS at 11:10

## 2020-09-03 RX ADMIN — POVIDONE-IODINE 15 ML: 10 SOLUTION TOPICAL at 10:45

## 2020-09-03 ASSESSMENT — PAIN SCALES - GENERAL: PAIN_LEVEL: 2

## 2020-09-03 ASSESSMENT — PAIN DESCRIPTION - PAIN TYPE: TYPE: SURGICAL PAIN

## 2020-09-03 NOTE — ANESTHESIA POSTPROCEDURE EVALUATION
Patient: Syed Ladd    Procedure Summary     Date: 09/03/20 Room / Location: [facility] 09 / SURGERY [facility]    Anesthesia Start: 1108 Anesthesia Stop: 1147    Procedure: REPAIR, HERNIA, VENTRAL (Abdomen) Diagnosis: (INCISIONAL HERNIA NO OBSTRUCTION OR GANGRENE)    Surgeon: Dianna Napoles M.D. Responsible Provider: Umer Coleman M.D.    Anesthesia Type: general ASA Status: 1          Final Anesthesia Type: general  Last vitals  BP   Blood Pressure: (!) 166/82    Temp   36.6 °C (97.8 °F)    Pulse   Pulse: 72   Resp   16    SpO2   98 %      Anesthesia Post Evaluation    Patient location during evaluation: PACU  Patient participation: complete - patient participated  Level of consciousness: awake and alert  Pain score: 2    Airway patency: patent  Anesthetic complications: no  Cardiovascular status: hemodynamically stable  Respiratory status: acceptable  Hydration status: euvolemic    PONV: none           Nurse Pain Score: 0 (NPRS)

## 2020-09-03 NOTE — OR NURSING
Pt's VSS; denies N/V; states pain is at tolerable level. Dressing CDI with no drainage. Swelling and some brusing noted at surgical site.. D/c orders received. IV removed. Pt changed into clothing on own.  Discharge instructions given; pt verbalized understanding and questions answered. Patient states ready to d/c home.Rx for Oxycodone given to pt. Pt dc'd in w/c with RN in stable condition.

## 2020-09-03 NOTE — DISCHARGE INSTRUCTIONS
ACTIVITY: Rest and take it easy for the first 24 hours.  A responsible adult is recommended to remain with you during that time.  It is normal to feel sleepy.  We encourage you to not do anything that requires balance, judgment or coordination.    MILD FLU-LIKE SYMPTOMS ARE NORMAL. YOU MAY EXPERIENCE GENERALIZED MUSCLE ACHES, THROAT IRRITATION, HEADACHE AND/OR SOME NAUSEA.    FOR 24 HOURS DO NOT:  Drive, operate machinery or run household appliances.  Drink beer or alcoholic beverages.   Make important decisions or sign legal documents.    SPECIAL INSTRUCTIONS:  Hernia Repair Discharge Instructions:     ACTIVITIES: Upon discharge from the hospital, the day of surgery it is requested that you do no significant physical activity and limit mental activities, as you have had sedation. The day after surgery, you may resume activities of daily living, but for four weeks, it is recommended that you do no strenuous activities or heavy lifting (greater than 15 pounds).     DRIVING: You may drive whenever you are off pain medications and are able to perform the activities needed to drive, i.e. turning, bending, twisting, etc. WOUND: It is not unusual for patients to experience swelling and even bruising at the hernia repair site. ICE: please use ice on the wound to decrease the swelling for the first 24 hours and then discontinue.    BATHING: The dressing can be removed two days after surgery and the wound can then be wetted in a shower as normal, but avoid submersion in water (tub bath) for at least 2 weeks.     PAIN MEDICATION: You will be given a prescription for pain medication at discharge. Please take these as directed. It is important to remember not to take medications on an empty stomach as this may cause nausea.     BOWEL FUNCTION: After hernia repair, it is not uncommon for patients to experience constipation. This is due to decreasing activity levels as well as pain medications. You may wish to use a stool  softener beginning immediately after surgery, and you may or may not need to use a laxative (Milk of Magnesia, Ex-lax; Senokot, etc.) as well.            CALL IF YOU HAVE: (1) Fevers to more than 1010 F, (2) Unusual chest or leg pain, (3) Drainage or fluid from incision that may be foul smelling, increased tenderness or soreness at the wound or the wound edges are no longer together,redness or swelling at the incision site. Please do not hesitate to call with any other questions.     APPOINTMENT: Contact our office at [phone] for a follow-up appointment in 2 weeks following your procedure. If you have any additional questions, please do not hesitate to call the office.   Office address: [address] [place], [place] [address]e [facility], [address] [place] [address]    DIET: To avoid nausea, slowly advance diet as tolerated, avoiding spicy or greasy foods for the first day.  Add more substantial food to your diet according to your physician's instructions.  Babies can be fed formula or breast milk as soon as they are hungry.  INCREASE FLUIDS AND FIBER TO AVOID CONSTIPATION.    SURGICAL DRESSING/BATHING: The dressing can be removed two days after surgery and the wound can then be wetted in a shower as normal, but avoid submersion in water (tub bath) for at least 2 weeks.     FOLLOW-UP APPOINTMENT:  A follow-up appointment should be arranged with your doctor in 2 weeks; call to schedule.    You should CALL YOUR PHYSICIAN if you develop:  Fever greater than 101 degrees F.  Pain not relieved by medication, or persistent nausea or vomiting.  Excessive bleeding (blood soaking through dressing) or unexpected drainage from the wound.  Extreme redness or swelling around the incision site, drainage of pus or foul smelling drainage.  Inability to urinate or empty your bladder within 8 hours.  Problems with breathing or chest pain.    You should call 911 if you develop problems with breathing or chest pain.  If you are unable to contact your doctor or surgical  center, you should go to the nearest emergency room or urgent care center.  Physician's telephone #: [phone]      If any questions arise, call your doctor.  If your doctor is not available, please feel free to call the Surgical Center at [phone].  The Center is open Monday through Friday from 7AM to 7PM.  You can also call the HEALTH HOTLINE open 24 hours/day, 7 days/week and speak to a nurse at [phone], or toll free at [phone].    A registered nurse may call you a few days after your surgery to see how you are doing after your procedure.    MEDICATIONS: Resume taking daily medication.  Take prescribed pain medication with food.  If no medication is prescribed, you may take non-aspirin pain medication if needed.  PAIN MEDICATION CAN BE VERY CONSTIPATING.  Take a stool softener or laxative such as senokot, pericolace, or milk of magnesia if needed.    Prescription given for Oxycodone.  Last pain medication given at 5mg Oxycodone at 12:39 PM    If your physician has prescribed pain medication that includes Acetaminophen (Tylenol), do not take additional Acetaminophen (Tylenol) while taking the prescribed medication.    Depression / Suicide Risk    As you are discharged from this [facility] facility, it is important to learn how to keep safe from harming yourself.    Recognize the warning signs:  · Abrupt changes in personality, positive or negative- including increase in energy   · Giving away possessions  · Change in eating patterns- significant weight changes-  positive or negative  · Change in sleeping patterns- unable to sleep or sleeping all the time   · Unwillingness or inability to communicate  · Depression  · Unusual sadness, discouragement and loneliness  · Talk of wanting to die  · Neglect of personal appearance   · Rebelliousness- reckless behavior  · Withdrawal from people/activities they love  · Confusion- inability to concentrate     If you or a loved one observes any of these  behaviors or has concerns about self-harm, here's what you can do:  · Talk about it- your feelings and reasons for harming yourself  · Remove any means that you might use to hurt yourself (examples: pills, rope, extension cords, firearm)  · Get professional help from the community (Mental Health, Substance Abuse, psychological counseling)  · Do not be alone:Call your Safe Contact- someone whom you trust who will be there for you.  · Call your local CRISIS HOTLINE 787-0773 or 316-557-8737  · Call your local Children's Mobile Crisis Response Team [place] (475) 522-9599 or www.[employer]  · Call the toll free National Suicide Prevention Hotlines   · National Suicide Prevention Lifeline 223-666-BYLM (6552)  · National Hope Line Network 800-SUICIDE (672-6491)

## 2020-09-03 NOTE — OP REPORT
DATE OF SERVICE:  09/03/2020    PREOPERATIVE DIAGNOSES:  Ostomy, ventral hernia.    POSTOPERATIVE DIAGNOSES:  Ostomy, ventral hernia.    PROCEDURE:  Ventral hernia repair with Ventralex mesh.    SURGEON:  Dianna Thompson MD    ASSISTANT:  BRYANNA Arceo    ANESTHESIA:  General endotracheal.    ANESTHESIOLOGIST:  Qasim Coleman MD    INDICATIONS:  The patient is a 60-year-old gentleman who has a history of   perforated diverticulitis, having a colostomy and colostomy closure.  He   subsequently has developed a hernia at his old ostomy site.  He is being   brought at this time for repair.    FINDINGS:  Approximately 3 cm defects repaired with a piece of Ventralex mesh.    PROCEDURE:  After the patient was identified and consented, he was brought to   the operating room and placed in supine position.  The patient underwent   general endotracheal anesthetic clearance.  The patient's abdomen was prepped   and draped in usual sterile fashion.  His previous ostomy site was reopened   using electrocautery, subcutaneous tissue was dissected down to the sac.  Once   that was completed, the defect was fully defined.  A piece of Ventralex mesh   was brought into the field and sewn in with interrupted 0 Ethibond.  Once that   was completed, the fascia was closed with 0 Ethibond.  Subcutaneous tissues   were approximated with 3-0 Vicryl, skin was closed with running 4-0 Vicryl in   subcuticular fashion.  Op-Site dressing placed on the wound.  The patient was   extubated and taken to recovery room in stable condition.  All sponge, needle   counts were correct.       ____________________________________     DIANNA THOMPSON MD    Health system / [facility]    DD:  09/03/2020 11:33:37  DT:  09/03/2020 11:52:37    D#:  [id number]  Job#:  [id number]    cc: QASIM COLEMAN MD

## 2020-09-03 NOTE — ANESTHESIA TIME REPORT
Anesthesia Start and Stop Event Times     Date Time Event    9/3/2020 1046 Ready for Procedure     1108 Anesthesia Start     1147 Anesthesia Stop        Responsible Staff  09/03/20    Name Role Begin End    Umer Coleman M.D. Anesth 1108 1147        Preop Diagnosis (Free Text):  Pre-op Diagnosis     INCISIONAL HERNIA NO OBSTRUCTION OR GANGRENE        Preop Diagnosis (Codes):    Post op Diagnosis  Incisional hernia      Premium Reason  Non-Premium    Comments:

## 2020-09-03 NOTE — ANESTHESIA PROCEDURE NOTES
Airway    Date/Time: 9/3/2020 11:12 AM  Performed by: Umer Coleman M.D.  Authorized by: Umer Coleman M.D.     Location:  OR  Urgency:  Elective  Indications for Airway Management:  Anesthesia      Spontaneous Ventilation: absent    Sedation Level:  Deep  Preoxygenated: Yes    Patient Position:  Sniffing  Final Airway Type:  Endotracheal airway  Final Endotracheal Airway:  ETT  Cuffed: Yes    Technique Used for Successful ETT Placement:  Direct laryngoscopy    Insertion Site:  Oral  Blade Type:  Adolfo  Laryngoscope Blade/Videolaryngoscope Blade Size:  4  ETT Size (mm):  7.5  Measured from:  Teeth  ETT to Teeth (cm):  24  Placement Verified by: auscultation and capnometry    Cormack-Lehane Classification:  Grade I - full view of glottis  Number of Attempts at Approach:  1

## 2020-12-17 ENCOUNTER — HOSPITAL ENCOUNTER (OUTPATIENT)
Facility: MEDICAL CENTER | Age: 60
End: 2020-12-17
Attending: SURGERY | Admitting: SURGERY
Payer: COMMERCIAL

## 2021-03-26 ENCOUNTER — OFFICE VISIT (OUTPATIENT)
Dept: MEDICAL GROUP | Facility: PHYSICIAN GROUP | Age: 61
End: 2021-03-26

## 2021-03-26 VITALS
WEIGHT: 187 LBS | DIASTOLIC BLOOD PRESSURE: 100 MMHG | TEMPERATURE: 98.3 F | HEIGHT: 72 IN | OXYGEN SATURATION: 97 % | HEART RATE: 88 BPM | SYSTOLIC BLOOD PRESSURE: 150 MMHG | RESPIRATION RATE: 20 BRPM | BODY MASS INDEX: 25.33 KG/M2

## 2021-03-26 DIAGNOSIS — Z12.11 SCREENING FOR COLORECTAL CANCER: ICD-10-CM

## 2021-03-26 DIAGNOSIS — Z12.12 SCREENING FOR COLORECTAL CANCER: ICD-10-CM

## 2021-03-26 DIAGNOSIS — Z00.00 WELLNESS EXAMINATION: ICD-10-CM

## 2021-03-26 DIAGNOSIS — Z11.59 NEED FOR HEPATITIS C SCREENING TEST: ICD-10-CM

## 2021-03-26 DIAGNOSIS — M1A.09X0 CHRONIC GOUT OF MULTIPLE SITES, UNSPECIFIED CAUSE: ICD-10-CM

## 2021-03-26 PROBLEM — G89.18 PAIN FOLLOWING SURGERY OR PROCEDURE: Status: RESOLVED | Noted: 2019-02-26 | Resolved: 2021-03-26

## 2021-03-26 PROBLEM — T81.43XA POSTPROCEDURAL INTRAABDOMINAL ABSCESS: Status: RESOLVED | Noted: 2019-06-21 | Resolved: 2021-03-26

## 2021-03-26 PROBLEM — T81.43XA POSTOPERATIVE INTRA-ABDOMINAL ABSCESS: Status: RESOLVED | Noted: 2019-03-08 | Resolved: 2021-03-26

## 2021-03-26 PROBLEM — Z93.3 COLOSTOMY IN PLACE (HCC): Status: RESOLVED | Noted: 2019-06-14 | Resolved: 2021-03-26

## 2021-03-26 PROBLEM — K65.1 POSTPROCEDURAL INTRAABDOMINAL ABSCESS (HCC): Status: RESOLVED | Noted: 2019-06-21 | Resolved: 2021-03-26

## 2021-03-26 PROBLEM — K65.1 POSTOPERATIVE INTRA-ABDOMINAL ABSCESS (HCC): Status: RESOLVED | Noted: 2019-03-08 | Resolved: 2021-03-26

## 2021-03-26 PROBLEM — E46 MALNUTRITION (HCC): Status: RESOLVED | Noted: 2019-06-24 | Resolved: 2021-03-26

## 2021-03-26 PROBLEM — M1A.9XX0 GOUT, CHRONIC: Status: ACTIVE | Noted: 2019-09-03

## 2021-03-26 PROBLEM — J95.821 RESPIRATORY FAILURE FOLLOWING TRAUMA AND SURGERY (HCC): Status: RESOLVED | Noted: 2019-06-21 | Resolved: 2021-03-26

## 2021-03-26 PROCEDURE — 99203 OFFICE O/P NEW LOW 30 MIN: CPT | Performed by: NURSE PRACTITIONER

## 2021-03-26 RX ORDER — ALLOPURINOL 300 MG/1
300 TABLET ORAL DAILY
Qty: 90 TABLET | Refills: 3 | Status: SHIPPED | OUTPATIENT
Start: 2021-03-26 | End: 2022-05-26

## 2021-03-26 RX ORDER — COLCHICINE 0.6 MG/1
0.6 TABLET ORAL
COMMUNITY
Start: 2021-03-03 | End: 2021-03-26 | Stop reason: SDUPTHER

## 2021-03-26 RX ORDER — COLCHICINE 0.6 MG/1
0.6 TABLET ORAL DAILY
Qty: 90 TABLET | Refills: 3 | Status: SHIPPED | OUTPATIENT
Start: 2021-03-26 | End: 2022-05-26

## 2021-03-26 RX ORDER — INDOMETHACIN 50 MG/1
50 CAPSULE ORAL EVERY MORNING
Qty: 90 CAPSULE | Refills: 3 | Status: SHIPPED | OUTPATIENT
Start: 2021-03-26 | End: 2023-09-19

## 2021-03-26 ASSESSMENT — FIBROSIS 4 INDEX: FIB4 SCORE: 0.28

## 2021-03-26 NOTE — ASSESSMENT & PLAN NOTE
Chronic and ongoing. Currently taking Allopurinol 300 mg, Colchicine 0.6 mg and Indomethacin 50 mg daily. He states that he is currently having a flare up and he is noticing it mostly in his fingers. He is needing refills on his medications. He does not know when he last had his uric acid checked.

## 2021-03-26 NOTE — PROGRESS NOTES
Subjective  Chief Complaint  Establish care to manage his chronic conditions    History of Present Illness  Syed Ladd is a 60 y.o. male. This patient is here today to establish care.     Gout, chronic  Chronic and ongoing. Currently taking Allopurinol 300 mg, Colchicine 0.6 mg and Indomethacin 50 mg daily. He states that he is currently having a flare up and he is noticing it mostly in his fingers. He is needing refills on his medications. He does not know when he last had his uric acid checked.    Past Medical History    Allergies: Ativan  Past Medical History:   Diagnosis Date   • Arthritis     rheumatoid, gout    • Colostomy in place ([facility]) 6/14/2019   • Diverticulitis    • Gout    • Malnutrition ([facility]) 6/24/2019   • Pain following surgery or procedure 2/26/2019   • Postoperative intra-abdominal abscess 3/8/2019   • Postprocedural intraabdominal abscess 6/21/2019   • Respiratory failure following trauma and surgery ([facility]) 6/21/2019     Past Surgical History:   Procedure Laterality Date   • VENTRAL HERNIA REPAIR  9/3/2020    Procedure: REPAIR, HERNIA, VENTRAL;  Surgeon: Dianna Napoles M.D.;  Location: [facility];  Service: General   • PB EXPLORATORY OF ABDOMEN  6/20/2019    Procedure: EXPLORATORY CELIOTOMY, RE_DO COLO_COLECTOMY;  Surgeon: Dianna Napoles M.D.;  Location: [facility];  Service: General   • COLOSTOMY CLOSURE  6/13/2019    Procedure: CLOSURE, COLOSTOMY;  Surgeon: Dianna Napoles M.D.;  Location: [facility];  Service: General   • EXPLORATORY LAPAROTOMY  2/18/2019    Procedure: EXPLORATORY LAPAROTOMY WITH SIGMOID COLECTOMY, COLOSTOMY CREATION  ABDOMINAL WASHOUT;  Surgeon: Dianna Napoles M.D.;  Location: [facility];  Service: General   • OTHER      draining of abdominal abscess 02/2019     Current Outpatient Medications Ordered in Epic   Medication Sig Dispense Refill   • allopurinol (ZYLOPRIM) 300 MG Tab Take 1 tablet by mouth every day. 90 tablet 3    • colchicine (COLCRYS) 0.6 MG Tab Take 1 tablet by mouth every day. 90 tablet 3   • indomethacin (INDOCIN) 50 MG Cap Take 1 capsule by mouth every morning. Indications: Gout 90 capsule 3     No current [facility]-ordered facility-administered medications on file.     Family History:    Family History   Problem Relation Age of Onset   • Cancer Mother 77   • Hypertension Father       Personal/Social History:    Social History     Tobacco Use   • Smoking status: Never Smoker   • Smokeless tobacco: Never Used   Substance Use Topics   • Alcohol use: Yes     Comment: 2 per week    • Drug use: Not Currently     Comment: last used 8/31     Social History     Social History Narrative   • Not on file      Review of Systems:     General: Negative for fever/chills and unexpected weight change.    Eyes:  Negative for vision changes, eye pain.   ENT:  Negative for hearing changes, ear pain, congestion, sore throat, and neck pain.    Respiratory:  Negative for cough and dyspnea.     Cardiovascular:  Negative for chest pain and palpitations.   Gastrointestinal:  Negative for nausea/vomiting, changes in bowel habits, and abdominal pain.    Genitourinary:  Negative for dysuria and hematuria.    Musculoskeletal:  Positive finger pain.   Skin:  Negative for rash.    Neurological:  Negative for numbness/tingling and headaches.    Heme/Lymph:  Does not bruise/bleed easily.     Objective  Physical Exam:   /100 (BP Location: Right arm, Patient Position: Sitting, BP Cuff Size: Adult)   Pulse 88   Temp 36.8 °C (98.3 °F) (Temporal)   Resp 20   Ht 1.829 m (6')   Wt 84.8 kg (187 lb)   SpO2 97%  Body mass index is 25.36 kg/m².  General:  Alert and oriented.  Well appearing.  NAD.  Head:  Normocephalic.   Neck: Supple without JVD. No lymphadenopathy.  Pulmonary:  Normal effort.  Clear to ausculation without rales, ronchi, or wheezing.  Cardiovascular:  Regular rate and rhythm without murmur, rubs or gallop.  Radial pulses are intact and  equal bilaterally.  Gastrointestinal: Abdomen soft, nontender, nondistended. Normal bowel sounds.  Musculoskeletal:  No extremity cyanosis, clubbing, or edema. Right hand pointer finger swollen with slight erythema.  Skin:  Warm and dry.  No obvious lesions.  Psych: Normal mood and affect. Alert and oriented x3. Judgment and insight is normal.      Assessment/Plan   1. Chronic gout of multiple sites, unspecified cause  Chronic and ongoing.  Continue to take Allopurinol 300 mg, Colchicine 0.6 mg and Indomethacin 50 mg daily.  Educated to avoid foods that may cause him to have a gout flare up.  Due for updated labs.  - allopurinol (ZYLOPRIM) 300 MG Tab; Take 1 tablet by mouth every day.  Dispense: 90 tablet; Refill: 3  - colchicine (COLCRYS) 0.6 MG Tab; Take 1 tablet by mouth every day.  Dispense: 90 tablet; Refill: 3  - indomethacin (INDOCIN) 50 MG Cap; Take 1 capsule by mouth every morning. Indications: Gout  Dispense: 90 capsule; Refill: 3  - URIC ACID; Future    2. Screening for colorectal cancer  - REFERRAL TO GI FOR COLONOSCOPY    3. Need for hepatitis C screening test  - HEP C VIRUS ANTIBODY; Future    4. Wellness examination  Due for updated labs.  - Comp Metabolic Panel; Future  - Lipid Profile; Future      Health Maintenance: Completed    Return in about 1 year (around 3/26/2022), or if symptoms worsen or fail to improve.    Please note that this dictation was created using voice recognition software. I have made every reasonable attempt to correct obvious errors, but I expect that there are errors of grammar and possibly content that I did not discover before finalizing the note.    CORY Acharya  [facility]

## 2022-05-26 DIAGNOSIS — M1A.09X0 CHRONIC GOUT OF MULTIPLE SITES, UNSPECIFIED CAUSE: ICD-10-CM

## 2022-05-26 RX ORDER — ALLOPURINOL 300 MG/1
TABLET ORAL
Qty: 30 TABLET | Refills: 0 | Status: SHIPPED | OUTPATIENT
Start: 2022-05-26 | End: 2022-07-22

## 2022-05-26 RX ORDER — COLCHICINE 0.6 MG/1
TABLET ORAL
Qty: 30 TABLET | Refills: 0 | Status: SHIPPED | OUTPATIENT
Start: 2022-05-26 | End: 2022-07-22

## 2022-06-21 ENCOUNTER — APPOINTMENT (OUTPATIENT)
Dept: RADIOLOGY | Facility: MEDICAL CENTER | Age: 62
End: 2022-06-21
Attending: EMERGENCY MEDICINE
Payer: MEDICARE

## 2022-06-21 ENCOUNTER — HOSPITAL ENCOUNTER (EMERGENCY)
Facility: MEDICAL CENTER | Age: 62
End: 2022-06-21
Attending: EMERGENCY MEDICINE
Payer: MEDICARE

## 2022-06-21 VITALS
RESPIRATION RATE: 16 BRPM | HEART RATE: 77 BPM | TEMPERATURE: 97 F | WEIGHT: 170 LBS | HEIGHT: 72 IN | DIASTOLIC BLOOD PRESSURE: 94 MMHG | BODY MASS INDEX: 23.03 KG/M2 | OXYGEN SATURATION: 97 % | SYSTOLIC BLOOD PRESSURE: 170 MMHG

## 2022-06-21 DIAGNOSIS — M10.9 ACUTE GOUT OF LEFT KNEE, UNSPECIFIED CAUSE: ICD-10-CM

## 2022-06-21 DIAGNOSIS — R10.32 LLQ ABDOMINAL PAIN: ICD-10-CM

## 2022-06-21 LAB
ALBUMIN SERPL BCP-MCNC: 4.8 G/DL (ref 3.2–4.9)
ALBUMIN/GLOB SERPL: 1.5 G/DL
ALP SERPL-CCNC: 158 U/L (ref 30–99)
ALT SERPL-CCNC: 16 U/L (ref 2–50)
ANION GAP SERPL CALC-SCNC: 14 MMOL/L (ref 7–16)
AST SERPL-CCNC: 15 U/L (ref 12–45)
BASOPHILS # BLD AUTO: 0.3 % (ref 0–1.8)
BASOPHILS # BLD: 0.04 K/UL (ref 0–0.12)
BILIRUB SERPL-MCNC: 1.8 MG/DL (ref 0.1–1.5)
BUN SERPL-MCNC: 10 MG/DL (ref 8–22)
CALCIUM SERPL-MCNC: 10 MG/DL (ref 8.5–10.5)
CHLORIDE SERPL-SCNC: 100 MMOL/L (ref 96–112)
CO2 SERPL-SCNC: 22 MMOL/L (ref 20–33)
CREAT SERPL-MCNC: 0.79 MG/DL (ref 0.5–1.4)
EOSINOPHIL # BLD AUTO: 0.03 K/UL (ref 0–0.51)
EOSINOPHIL NFR BLD: 0.2 % (ref 0–6.9)
ERYTHROCYTE [DISTWIDTH] IN BLOOD BY AUTOMATED COUNT: 44.5 FL (ref 35.9–50)
GFR SERPLBLD CREATININE-BSD FMLA CKD-EPI: 100 ML/MIN/1.73 M 2
GLOBULIN SER CALC-MCNC: 3.2 G/DL (ref 1.9–3.5)
GLUCOSE SERPL-MCNC: 108 MG/DL (ref 65–99)
HCT VFR BLD AUTO: 47.2 % (ref 42–52)
HGB BLD-MCNC: 16.4 G/DL (ref 14–18)
IMM GRANULOCYTES # BLD AUTO: 0.08 K/UL (ref 0–0.11)
IMM GRANULOCYTES NFR BLD AUTO: 0.5 % (ref 0–0.9)
LIPASE SERPL-CCNC: 16 U/L (ref 11–82)
LYMPHOCYTES # BLD AUTO: 2.18 K/UL (ref 1–4.8)
LYMPHOCYTES NFR BLD: 14.2 % (ref 22–41)
MCH RBC QN AUTO: 31 PG (ref 27–33)
MCHC RBC AUTO-ENTMCNC: 34.7 G/DL (ref 33.7–35.3)
MCV RBC AUTO: 89.2 FL (ref 81.4–97.8)
MONOCYTES # BLD AUTO: 1.07 K/UL (ref 0–0.85)
MONOCYTES NFR BLD AUTO: 7 % (ref 0–13.4)
NEUTROPHILS # BLD AUTO: 11.99 K/UL (ref 1.82–7.42)
NEUTROPHILS NFR BLD: 77.8 % (ref 44–72)
NRBC # BLD AUTO: 0 K/UL
NRBC BLD-RTO: 0 /100 WBC
PLATELET # BLD AUTO: 391 K/UL (ref 164–446)
PMV BLD AUTO: 9.6 FL (ref 9–12.9)
POTASSIUM SERPL-SCNC: 3.9 MMOL/L (ref 3.6–5.5)
PROT SERPL-MCNC: 8 G/DL (ref 6–8.2)
RBC # BLD AUTO: 5.29 M/UL (ref 4.7–6.1)
SODIUM SERPL-SCNC: 136 MMOL/L (ref 135–145)
WBC # BLD AUTO: 15.4 K/UL (ref 4.8–10.8)

## 2022-06-21 PROCEDURE — 36415 COLL VENOUS BLD VENIPUNCTURE: CPT

## 2022-06-21 PROCEDURE — 85025 COMPLETE CBC W/AUTO DIFF WBC: CPT

## 2022-06-21 PROCEDURE — 99285 EMERGENCY DEPT VISIT HI MDM: CPT

## 2022-06-21 PROCEDURE — 700102 HCHG RX REV CODE 250 W/ 637 OVERRIDE(OP): Performed by: EMERGENCY MEDICINE

## 2022-06-21 PROCEDURE — 80053 COMPREHEN METABOLIC PANEL: CPT

## 2022-06-21 PROCEDURE — 74176 CT ABD & PELVIS W/O CONTRAST: CPT

## 2022-06-21 PROCEDURE — A9270 NON-COVERED ITEM OR SERVICE: HCPCS | Performed by: EMERGENCY MEDICINE

## 2022-06-21 PROCEDURE — 83690 ASSAY OF LIPASE: CPT

## 2022-06-21 RX ORDER — ALLOPURINOL 300 MG/1
300 TABLET ORAL DAILY
Qty: 30 TABLET | Refills: 0 | Status: SHIPPED | OUTPATIENT
Start: 2022-06-21 | End: 2022-07-22 | Stop reason: SDUPTHER

## 2022-06-21 RX ORDER — COLCHICINE 0.6 MG/1
0.6 TABLET ORAL DAILY
Qty: 30 TABLET | Refills: 0 | Status: SHIPPED | OUTPATIENT
Start: 2022-06-21 | End: 2022-07-22 | Stop reason: SDUPTHER

## 2022-06-21 RX ORDER — IBUPROFEN 600 MG/1
600 TABLET ORAL ONCE
Status: COMPLETED | OUTPATIENT
Start: 2022-06-21 | End: 2022-06-21

## 2022-06-21 RX ORDER — ACETAMINOPHEN 325 MG/1
650 TABLET ORAL ONCE
Status: COMPLETED | OUTPATIENT
Start: 2022-06-21 | End: 2022-06-21

## 2022-06-21 RX ADMIN — IBUPROFEN 600 MG: 600 TABLET, FILM COATED ORAL at 11:02

## 2022-06-21 RX ADMIN — ACETAMINOPHEN 650 MG: 325 TABLET ORAL at 11:02

## 2022-06-21 NOTE — DISCHARGE INSTRUCTIONS
CT scan shows no infection or diverticulitis.    Medications for gout have been sent to the pharmacy please get these filled today and start taking them later today.  Please see your primary care doctor for further follow-up for your gout.

## 2022-06-21 NOTE — ED PROVIDER NOTES
ED Provider  Scribed for Tino Weiner D.O. by Vik Gustafson. 6/21/2022  10:35 AM    Means of arrival:Walk-in  History obtained from:Patient   History limited by: None    CHIEF COMPLAINT  Chief Complaint   Patient presents with    Other     Pt reports gout flare up and being out of his medication    Abdominal Pain     L sided pt reports d/t hernia       HPI  Syed Ladd is a 62 y.o. male who presents to the ED with gout pain onset 2-3 days ago. The patient is experiencing associated left knee, foot, and hand pain. The patient reports a history of gout. He normally takes Zyloprim and Colcrys for his gout with relief, but ran out 2 weeks ago. He adds that he had an appointment today at 4:00 PM to get a refill, but that his pain worsened, prompting him to present to the ED.  The patient is also complaining of left lower abdominal pain onset 2-3 days ago. He reports a history of diverticulitis with sepsis. Family states the patient had part of his bowel removed and had complications during the surgery, causing him to be hospitalized in the ICU for 19 days. Per patient, he recently had a hernia repair. He has a history colostomy. Patient is allergic to ativan.     REVIEW OF SYSTEMS  See HPI for further details. All other systems are negative.     PAST MEDICAL HISTORY   has a past medical history of Arthritis, Colostomy in place ([facility]) (6/14/2019), Diverticulitis, Gout, Malnutrition ([facility]) (6/24/2019), Pain following surgery or procedure (2/26/2019), Postoperative intra-abdominal abscess (3/8/2019), Postprocedural intraabdominal abscess (6/21/2019), and Respiratory failure following trauma and surgery ([facility]) (6/21/2019).    SOCIAL HISTORY  Social History     Tobacco Use    Smoking status: Never Smoker    Smokeless tobacco: Never Used   Vaping Use    Vaping Use: Never used   Substance and Sexual Activity    Alcohol use: Yes     Comment: 2 per week     Drug use: Not Currently     Comment: last used 8/31    Sexual  activity: Yes     Partners: Female       SURGICAL HISTORY   has a past surgical history that includes other; colostomy closure (6/13/2019); exploratory of abdomen (6/20/2019); exploratory laparotomy (2/18/2019); and ventral hernia repair (9/3/2020).    CURRENT MEDICATIONS  Home Medications       Reviewed by Cecy Acuna R.N. (Registered Nurse) on 06/21/22 at 0843  Med List Status: Partial     Medication Last Dose Status   allopurinol (ZYLOPRIM) 300 MG Tab  Active   colchicine (COLCRYS) 0.6 MG Tab  Active   indomethacin (INDOCIN) 50 MG Cap  Active                    ALLERGIES  Allergies   Allergen Reactions    Ativan      Irritable        PHYSICAL EXAM  VITAL SIGNS: BP (!) 164/98   Pulse 80   Temp 36.2 °C (97.1 °F) (Temporal)   Resp 14   Ht 1.829 m (6')   Wt 77.1 kg (170 lb)   SpO2 98%   BMI 23.06 kg/m²   Constitutional: Alert in no apparent distress.  HENT: No signs of trauma, mucous membranes are moist  Eyes: Conjunctiva normal, Non-icteric.   Neck: Normal range of motion, No tenderness, Supple.  Lymphatic: No lymphadenopathy noted.   Cardiovascular: Regular rate and rhythm, no murmurs.   Thorax & Lungs: Normal breath sounds, No respiratory distress, No wheezing, No chest tenderness.   Abdomen: Bowel sounds normal, Soft, No masses, No pulsatile masses. No peritoneal signs, Left abdomen has soft hernia easily reproduced and non tender.  Skin: Warm, Dry, normal color, Multiple surgical scars.   Back: No bony tenderness, No CVA tenderness.   Extremities: No edema, No tenderness, No cyanosis, Left foot  has erythema and tenderess at the 5th MP joint, Left knee has fusion without erythema, the knee is stable,   Musculoskeletal: Good range of motion in all major joints. No tenderness to palpation or major deformities noted.   Neurologic: Alert and oriented x4, Normal motor function, Normal sensory function, No focal deficits noted.   Psychiatric: Affect normal, Judgment normal, Mood normal.     DIAGNOSTIC  STUDIES / PROCEDURES    LABS  Results for orders placed or performed during the hospital encounter of 06/21/22   CBC WITH DIFFERENTIAL   Result Value Ref Range    WBC 15.4 (H) 4.8 - 10.8 K/uL    RBC 5.29 4.70 - 6.10 M/uL    Hemoglobin 16.4 14.0 - 18.0 g/dL    Hematocrit 47.2 42.0 - 52.0 %    MCV 89.2 81.4 - 97.8 fL    MCH 31.0 27.0 - 33.0 pg    MCHC 34.7 33.7 - 35.3 g/dL    RDW 44.5 35.9 - 50.0 fL    Platelet Count 391 164 - 446 K/uL    MPV 9.6 9.0 - 12.9 fL    Neutrophils-Polys 77.80 (H) 44.00 - 72.00 %    Lymphocytes 14.20 (L) 22.00 - 41.00 %    Monocytes 7.00 0.00 - 13.40 %    Eosinophils 0.20 0.00 - 6.90 %    Basophils 0.30 0.00 - 1.80 %    Immature Granulocytes 0.50 0.00 - 0.90 %    Nucleated RBC 0.00 /100 WBC    Neutrophils (Absolute) 11.99 (H) 1.82 - 7.42 K/uL    Lymphs (Absolute) 2.18 1.00 - 4.80 K/uL    Monos (Absolute) 1.07 (H) 0.00 - 0.85 K/uL    Eos (Absolute) 0.03 0.00 - 0.51 K/uL    Baso (Absolute) 0.04 0.00 - 0.12 K/uL    Immature Granulocytes (abs) 0.08 0.00 - 0.11 K/uL    NRBC (Absolute) 0.00 K/uL   COMP METABOLIC PANEL   Result Value Ref Range    Sodium 136 135 - 145 mmol/L    Potassium 3.9 3.6 - 5.5 mmol/L    Chloride 100 96 - 112 mmol/L    Co2 22 20 - 33 mmol/L    Anion Gap 14.0 7.0 - 16.0    Glucose 108 (H) 65 - 99 mg/dL    Bun 10 8 - 22 mg/dL    Creatinine 0.79 0.50 - 1.40 mg/dL    Calcium 10.0 8.5 - 10.5 mg/dL    AST(SGOT) 15 12 - 45 U/L    ALT(SGPT) 16 2 - 50 U/L    Alkaline Phosphatase 158 (H) 30 - 99 U/L    Total Bilirubin 1.8 (H) 0.1 - 1.5 mg/dL    Albumin 4.8 3.2 - 4.9 g/dL    Total Protein 8.0 6.0 - 8.2 g/dL    Globulin 3.2 1.9 - 3.5 g/dL    A-G Ratio 1.5 g/dL   LIPASE   Result Value Ref Range    Lipase 16 11 - 82 U/L   ESTIMATED GFR   Result Value Ref Range    GFR (CKD-EPI) 100 >60 mL/min/1.73 m 2       RADIOLOGY  CT-ABDOMEN-PELVIS W/O   Final Result      1.  No acute intra-abdominal findings      2.  Left Spigelian hernia containing fat      3.  Atherosclerosis.        The  radiologist's interpretations of all radiological studies have been reviewed by me.    Films have been independently by me      COURSE  Pertinent Labs & Imaging studies reviewed. (See chart for details)    10:35 AM - Patient seen and examined at bedside. Discussed plan of care. The patient will be medicated with Tylenol 650 mg, and Motrin 600 mg. Ordered for UA, Lipase, CMP, CBC w/ diff., and CT- abdomen- pelvis w/o to evaluate his symptoms.     11:48 AM - The patient was reevaluated at bedside. His pain has improved upon medication and he was updated on the plan of discharge.    MEDICAL DECISION MAKING  This is a 62 y.o. male who presents with complaints of left foot, left knee pain.  This is consistent with his prior history of gout.  He is run out of his medication for gout.  He was given a refill for allopurinol, he has been seen previous medications Motrin Tylenol here and that did improve his pain.    He does have left lower abdominal pain.  There is a hernia there that is reducible and soft.  CT was done there is no signs of infection or obstruction.  With this he is stable for discharge home with symptomatic care of his gout.     The patient will return for new or worsening symptoms and is stable at the time of discharge.    The patient is referred to a primary physician for blood pressure management, diabetic screening, and for all other preventative health concerns.      DISPOSITION:  Patient will be discharged home in stable condition.    FOLLOW UP:  Sharifa Joseph, A.P.R.N.  [address] [facility] [id number] [phone]    In 3 days      OUTPATIENT MEDICATIONS:  Discharge Medication List as of 6/21/2022 12:01 PM        START taking these medications    Details   !! allopurinol (ZYLOPRIM) 300 MG Tab Take 1 Tablet by mouth every day., Disp-30 Tablet, R-0, Normal      !! colchicine (COLCRYS) 0.6 MG Tab Take 1 Tablet by mouth every day., Disp-30 Tablet, R-0, Normal       !! - Potential duplicate  medications found. Please discuss with provider.            FINAL IMPRESSION  1. Acute gout of left knee, unspecified cause    2. LLQ abdominal pain         Vik WEAVER (Scribe), am scribing for, and in the presence of, Tino Weiner D.O..    Electronically signed by: Vik Gustafson (Scribe), 6/21/2022    Tino WEAVER D.O. personally performed the services described in this documentation, as scribed by Vik Gustafson in my presence, and it is both accurate and complete.    The note accurately reflects work and decisions made by me.  Tino Weiner D.O.  6/21/2022  3:00 PM

## 2022-06-21 NOTE — ED TRIAGE NOTES
Chief Complaint   Patient presents with   • Other     Pt reports gout flare up and being out of his medication   • Abdominal Pain     L sided pt reports d/t hernia     BP (!) 164/98   Pulse 80   Temp 36.2 °C (97.1 °F) (Temporal)   Resp 14   Ht 1.829 m (6')   Wt 77.1 kg (170 lb)   SpO2 98%   BMI 23.06 kg/m²

## 2022-06-21 NOTE — ED NOTES
Medicated patient per MAR. Patient denies further needs. Call light within reach. Family at bedside.

## 2022-07-22 ENCOUNTER — OFFICE VISIT (OUTPATIENT)
Dept: MEDICAL GROUP | Facility: PHYSICIAN GROUP | Age: 62
End: 2022-07-22

## 2022-07-22 VITALS
BODY MASS INDEX: 24.14 KG/M2 | HEART RATE: 78 BPM | HEIGHT: 72 IN | DIASTOLIC BLOOD PRESSURE: 84 MMHG | SYSTOLIC BLOOD PRESSURE: 132 MMHG | TEMPERATURE: 98.2 F | OXYGEN SATURATION: 96 % | WEIGHT: 178.2 LBS | RESPIRATION RATE: 16 BRPM

## 2022-07-22 DIAGNOSIS — M1A.09X1 IDIOPATHIC CHRONIC GOUT OF MULTIPLE SITES WITH TOPHUS: ICD-10-CM

## 2022-07-22 DIAGNOSIS — M10.9 ACUTE GOUT OF LEFT KNEE, UNSPECIFIED CAUSE: ICD-10-CM

## 2022-07-22 DIAGNOSIS — D72.825 BANDEMIA: ICD-10-CM

## 2022-07-22 PROBLEM — K57.20 DIVERTICULITIS OF COLON WITH PERFORATION: Status: RESOLVED | Noted: 2019-02-20 | Resolved: 2022-07-22

## 2022-07-22 PROBLEM — D50.0 BLOOD LOSS ANEMIA: Status: RESOLVED | Noted: 2019-06-23 | Resolved: 2022-07-22

## 2022-07-22 PROCEDURE — 99213 OFFICE O/P EST LOW 20 MIN: CPT | Performed by: FAMILY MEDICINE

## 2022-07-22 RX ORDER — COLCHICINE 0.6 MG/1
0.6 TABLET ORAL DAILY
Qty: 90 TABLET | Refills: 3 | Status: SHIPPED | OUTPATIENT
Start: 2022-07-22 | End: 2023-08-23

## 2022-07-22 RX ORDER — ALLOPURINOL 300 MG/1
300 TABLET ORAL DAILY
Qty: 90 TABLET | Refills: 3 | Status: SHIPPED | OUTPATIENT
Start: 2022-07-22 | End: 2023-08-23

## 2022-07-22 ASSESSMENT — PATIENT HEALTH QUESTIONNAIRE - PHQ9: CLINICAL INTERPRETATION OF PHQ2 SCORE: 0

## 2022-07-22 ASSESSMENT — FIBROSIS 4 INDEX: FIB4 SCORE: 0.6

## 2022-07-22 NOTE — ASSESSMENT & PLAN NOTE
This is a chronic problem.  Patient's had gout since his 30s.  He also has tophi.  He ran out of his medications and within 3 days had a gout attack.  Was seen in the emergency room and given a 30-day supply.  He states he is planning on try to get his insurance sorted out and later this year to go to [place] to have the tophi removed.  I do not see any results of uric acid in his chart.

## 2022-07-22 NOTE — PROGRESS NOTES
Subjective:     CC: Patient is here to get a refill on his gout medications.    HPI:   Syed presents today with the following medical concerns:    Idiopathic chronic gout of multiple sites with tophus  This is a chronic problem.  Patient's had gout since his 30s.  He also has tophi.  He ran out of his medications and within 3 days had a gout attack.  Was seen in the emergency room and given a 30-day supply.  He states he is planning on try to get his insurance sorted out and later this year to go to [place] to have the tophi removed.  I do not see any results of uric acid in his chart.    Bandemia  This is a new problem.  In the emergency room he had elevation of his white cell count.  This was during a gout attack.      Past Medical History:   Diagnosis Date   • Arthritis     rheumatoid, gout    • Colostomy in place ([facility]) 6/14/2019   • Diverticulitis    • Gout    • Malnutrition ([facility]) 6/24/2019   • Pain following surgery or procedure 2/26/2019   • Postoperative intra-abdominal abscess 3/8/2019   • Postprocedural intraabdominal abscess 6/21/2019   • Respiratory failure following trauma and surgery ([facility]) 6/21/2019       Social History     Tobacco Use   • Smoking status: Never Smoker   • Smokeless tobacco: Never Used   Vaping Use   • Vaping Use: Never used   Substance Use Topics   • Alcohol use: Yes     Comment: not usually - socially   • Drug use: Not Currently     Comment: last used 8/31       Current Outpatient Medications Ordered in Epic   Medication Sig Dispense Refill   • allopurinol (ZYLOPRIM) 300 MG Tab Take 1 Tablet by mouth every day. 90 Tablet 3   • colchicine (COLCRYS) 0.6 MG Tab Take 1 Tablet by mouth every day. 90 Tablet 3   • indomethacin (INDOCIN) 50 MG Cap Take 1 capsule by mouth every morning. Indications: Gout 90 capsule 3     No current [facility]-ordered facility-administered medications on file.       Allergies:  Ativan    Health Maintenance: Completed    ROS:  Gen: no fevers/chills, no changes in  weight  Eyes: no changes in vision  ENT: no sore throat, no hearing loss, no bloody nose  Pulm: no sob, no cough  CV: no chest pain, no palpitations  GI: no nausea/vomiting, no diarrhea  : no dysuria  MSk: no myalgias  Skin: no rash  Neuro: no headaches, no numbness/tingling  Heme/Lymph: no easy bruising      Objective:       Exam:  /84 (BP Location: Left arm, Patient Position: Sitting, BP Cuff Size: Adult)   Pulse 78   Temp 36.8 °C (98.2 °F) (Temporal)   Resp 16   Ht 1.829 m (6')   Wt 80.8 kg (178 lb 3.2 oz)   SpO2 96%   BMI 24.17 kg/m²  Body mass index is 24.17 kg/m².    Gen: Alert and oriented, No apparent distress.  Ext: No clubbing, cyanosis, edema.  Patient has multiple large gouty tophi.  No acute joint inflammation seen today.        Labs: Reviewed    Assessment & Plan:     62 y.o. male with the following -     1. Idiopathic chronic gout of multiple sites with tophus  This is a chronic problem.  Patient was told he really should get a uric acid level done to see if it is below the goal of 6.  As that should help reduce or prevent future tophi.  He says he might get labs done later this year or when he goes to [place].    2. Acute gout of left knee, unspecified cause  This is an acute problem is now resolved.  Medication renewed.  - allopurinol (ZYLOPRIM) 300 MG Tab; Take 1 Tablet by mouth every day.  Dispense: 90 Tablet; Refill: 3  - colchicine (COLCRYS) 0.6 MG Tab; Take 1 Tablet by mouth every day.  Dispense: 90 Tablet; Refill: 3    3. Bandemia  This is a new problem.  Patient was also told he should have his white cell count rechecked.  He is self-pay so he will wait do that later this year.      Return if symptoms worsen or fail to improve.    Please note that this dictation was created using voice recognition software. I have made every reasonable attempt to correct obvious errors, but I expect that there are errors of grammar and possibly content that I did not discover before finalizing  the note.

## 2022-07-22 NOTE — ASSESSMENT & PLAN NOTE
This is a new problem.  In the emergency room he had elevation of his white cell count.  This was during a gout attack.

## 2022-11-08 ENCOUNTER — PATIENT MESSAGE (OUTPATIENT)
Dept: HEALTH INFORMATION MANAGEMENT | Facility: OTHER | Age: 62
End: 2022-11-08

## 2023-07-11 NOTE — H&P
DATE OF ADMISSION:  02/18/2019    IDENTIFICATION:  This is a 58-year-old male.    HISTORY OF PRESENT ILLNESS:  The patient was in his usual state of health   until 6 days ago when he started getting abdominal pain in his periumbilical   area that crossed the mid part of his abdomen going up his right side.  It   progressively worsened and he started having fevers.  He ultimately came to   the emergency room today.  He was found to have a white count of 23,000 and a   CT scan of his abdomen showing multiple intraabdominal abscesses.  I have been   asked to see him in regards to this.    PAST MEDICAL HISTORY:  Illnesses are gout.    PAST SURGICAL HISTORY:  None.    MEDICATIONS:  Allopurinol.    ALLERGIES:  TO VICODIN.    SOCIAL HISTORY:  He does not smoke.  He does drink rarely.    REVIEW OF SYSTEMS:  Otherwise unremarkable.    PHYSICAL EXAMINATION:  VITAL SIGNS:  He is afebrile.  HEENT:  He is current anicteric.  NECK:  Supple.  ABDOMEN:  Diffusely tender.  EXTREMITIES:  Without edema.  NEUROLOGIC:  Intact.    IMPRESSION:  A 58-year-old male with abdominal pain, intraabdominal abscesses,   possibly from perforated appendicitis or possibly perforated diverticulitis.    PLAN:  Will be for him to undergo diagnostic laparoscopy and possible   laparotomy.  This has been explained to him as well as risks including   bleeding, infection, possible conversion to open procedure, possible bowel   resection, possible ostomy, and anesthetic risk.  They understand and wished   to proceed.       ____________________________________     MD PRITI WRIGHT / BORIS    DD:  02/18/2019 15:59:45  DT:  02/18/2019 16:08:37    D#:  [id number]  Job#:  [id number]   Quality 130: Documentation Of Current Medications In The Medical Record: Current Medications Documented Quality 431: Preventive Care And Screening: Unhealthy Alcohol Use - Screening: Patient not identified as an unhealthy alcohol user when screened for unhealthy alcohol use using a systematic screening method Quality 226: Preventive Care And Screening: Tobacco Use: Screening And Cessation Intervention: Patient screened for tobacco use and is an ex/non-smoker Detail Level: Detailed Quality 110: Preventive Care And Screening: Influenza Immunization: Influenza Immunization not Administered because Patient Refused.

## 2023-08-23 DIAGNOSIS — M10.9 ACUTE GOUT OF LEFT KNEE, UNSPECIFIED CAUSE: ICD-10-CM

## 2023-08-23 RX ORDER — COLCHICINE 0.6 MG/1
0.6 TABLET ORAL DAILY
Qty: 30 TABLET | Refills: 0 | Status: SHIPPED | OUTPATIENT
Start: 2023-08-23 | End: 2023-09-19 | Stop reason: SDUPTHER

## 2023-08-23 RX ORDER — ALLOPURINOL 300 MG/1
300 TABLET ORAL DAILY
Qty: 30 TABLET | Refills: 0 | Status: SHIPPED | OUTPATIENT
Start: 2023-08-23 | End: 2023-09-19 | Stop reason: SDUPTHER

## 2023-08-23 NOTE — TELEPHONE ENCOUNTER
Patient called requesting refill on medications as patient has gout flare ups frequently.    Was advised on Sharifa not being with [facility]. Requested appointment just for refills has patient is in between insurances right now has appointment 9/1/2023

## 2023-09-12 ENCOUNTER — TELEPHONE (OUTPATIENT)
Dept: SCHEDULING | Facility: IMAGING CENTER | Age: 63
End: 2023-09-12

## 2023-09-19 ENCOUNTER — OFFICE VISIT (OUTPATIENT)
Dept: MEDICAL GROUP | Facility: MEDICAL CENTER | Age: 63
End: 2023-09-19

## 2023-09-19 VITALS
TEMPERATURE: 98.6 F | DIASTOLIC BLOOD PRESSURE: 80 MMHG | HEIGHT: 72 IN | BODY MASS INDEX: 24.52 KG/M2 | HEART RATE: 74 BPM | SYSTOLIC BLOOD PRESSURE: 124 MMHG | WEIGHT: 181 LBS | OXYGEN SATURATION: 99 %

## 2023-09-19 DIAGNOSIS — M70.31 BURSITIS OF BOTH ELBOWS, UNSPECIFIED BURSA: ICD-10-CM

## 2023-09-19 DIAGNOSIS — M1A.09X1 IDIOPATHIC CHRONIC GOUT OF MULTIPLE SITES WITH TOPHUS: ICD-10-CM

## 2023-09-19 DIAGNOSIS — M70.32 BURSITIS OF BOTH ELBOWS, UNSPECIFIED BURSA: ICD-10-CM

## 2023-09-19 PROBLEM — K43.9 HERNIA OF ABDOMINAL WALL: Status: ACTIVE | Noted: 2023-09-19

## 2023-09-19 PROBLEM — D72.825 BANDEMIA: Status: RESOLVED | Noted: 2022-07-22 | Resolved: 2023-09-19

## 2023-09-19 PROCEDURE — 3079F DIAST BP 80-89 MM HG: CPT

## 2023-09-19 PROCEDURE — 99214 OFFICE O/P EST MOD 30 MIN: CPT

## 2023-09-19 PROCEDURE — 3074F SYST BP LT 130 MM HG: CPT

## 2023-09-19 RX ORDER — ALLOPURINOL 300 MG/1
600 TABLET ORAL DAILY
Qty: 60 TABLET | Refills: 11 | Status: SHIPPED | OUTPATIENT
Start: 2023-09-19

## 2023-09-19 RX ORDER — COLCHICINE 0.6 MG/1
0.6 TABLET ORAL DAILY
Qty: 30 TABLET | Refills: 11 | Status: SHIPPED | OUTPATIENT
Start: 2023-09-19

## 2023-09-19 ASSESSMENT — ENCOUNTER SYMPTOMS
FEVER: 0
ORTHOPNEA: 0
SHORTNESS OF BREATH: 0
COUGH: 0
CHILLS: 0
PALPITATIONS: 0

## 2023-09-19 ASSESSMENT — PATIENT HEALTH QUESTIONNAIRE - PHQ9: CLINICAL INTERPRETATION OF PHQ2 SCORE: 0

## 2023-09-19 ASSESSMENT — FIBROSIS 4 INDEX: FIB4 SCORE: 0.61

## 2023-09-19 NOTE — PROGRESS NOTES
Subjective:     CC: New Patient and Gout      HPI:   Syed is a 63 y.o. male who presents today for:    Gout/ bursitis: currently taking allopurinol and colchicine daily due to constant flare ups of gout. It affects his elbows, fingers, knees, and feet. He has been dealing with this since he was 34 years old. If he misses a day of his colchicine, then he can feel tingling in his joints. He does have bilateral bursitis secondary to gout.         Allergies: Ativan     Medications:   Current Outpatient Medications:     allopurinol (ZYLOPRIM) 300 MG Tab, Take 2 Tablets by mouth every day., Disp: 60 Tablet, Rfl: 11    colchicine (COLCRYS) 0.6 MG Tab, Take 1 Tablet by mouth every day., Disp: 30 Tablet, Rfl: 11      ROS:  Review of Systems   Constitutional:  Negative for chills and fever.   Respiratory:  Negative for cough and shortness of breath.    Cardiovascular:  Negative for chest pain, palpitations, orthopnea and leg swelling.       Objective:     Exam:  /80   Pulse 74   Temp 37 °C (98.6 °F)   Ht 1.829 m (6')   Wt 82.1 kg (181 lb)   SpO2 99%   BMI 24.55 kg/m²  Body mass index is 24.55 kg/m².    Physical Exam  Constitutional:       Appearance: He is normal weight.   Eyes:      Pupils: Pupils are equal, round, and reactive to light.   Cardiovascular:      Rate and Rhythm: Normal rate and regular rhythm.      Pulses: Normal pulses.      Heart sounds: Normal heart sounds.   Pulmonary:      Effort: Pulmonary effort is normal.      Breath sounds: Normal breath sounds.   Neurological:      Mental Status: He is alert and oriented to person, place, and time.   Psychiatric:         Mood and Affect: Mood normal.         Behavior: Behavior normal.           Assessment & Plan:     Syed a 63 y.o. male with the following -     1. Idiopathic chronic gout of multiple sites with tophus  2. Bursitis of both elbows, unspecified bursa  Chronic, unstable  Will increase allopurinol since he has been using colchicine daily due  to chronic pain/ flare ups. Discussed diet, and avoiding alcohol. Patient declined blood work today due to not having insurance.   - allopurinol (ZYLOPRIM) 300 MG Tab; Take 2 Tablets by mouth every day.  Dispense: 60 Tablet; Refill: 11  - colchicine (COLCRYS) 0.6 MG Tab; Take 1 Tablet by mouth every day.  Dispense: 30 Tablet; Refill: 11      Anticipatory guidance included the following: Patient counseled about skin care, diet, supplements, smoking, drugs/alcohol use, safe sex and exercise.     Return in about 6 months (around 3/19/2024), or if symptoms worsen or fail to improve.    Please note that this dictation was created using voice recognition software. I have made every reasonable attempt to correct obvious errors, but I expect that there are errors of grammar and possibly content that I did not discover before finalizing the note.

## 2024-05-06 NOTE — OR NURSING
Faxed over to [address] [place] [address]  approval notice   Case number [id number]    Approve fro AIMOVIG 140 /mg/ml solutions  auto injector  effective 02/03//2025   Patient allergies and NPO status verified. Belongings secured. Patient verbalizes understanding of pain scale, expected course of stay and plan of care. Surgical site verified with patient. IV access established. Call light within reach. No further needs at this time. Hourly rounding in place.

## 2025-02-19 DIAGNOSIS — M1A.09X1 IDIOPATHIC CHRONIC GOUT OF MULTIPLE SITES WITH TOPHUS: ICD-10-CM

## 2025-02-19 RX ORDER — COLCHICINE 0.6 MG/1
0.6 TABLET ORAL DAILY
Qty: 90 TABLET | Refills: 0 | Status: SHIPPED | OUTPATIENT
Start: 2025-02-19

## 2025-02-19 RX ORDER — ALLOPURINOL 300 MG/1
TABLET ORAL
Qty: 180 TABLET | Refills: 0 | Status: SHIPPED | OUTPATIENT
Start: 2025-02-19

## 2025-02-19 NOTE — TELEPHONE ENCOUNTER
Received request via: Pharmacy    Was the patient seen in the last year in this department? Yes    Does the patient have an active prescription (recently filled or refills available) for medication(s) requested? No    Pharmacy Name:   To be filled at: [facility] [address] [place] [address] [address] [facility]              Does the patient have custodial Plus and need 100-day supply? (This applies to ALL medications) Patient does not have SCP

## 2025-05-08 DIAGNOSIS — M1A.09X1 IDIOPATHIC CHRONIC GOUT OF MULTIPLE SITES WITH TOPHUS: ICD-10-CM

## 2025-05-09 RX ORDER — COLCHICINE 0.6 MG/1
0.6 TABLET ORAL DAILY
Qty: 60 TABLET | Refills: 0 | Status: SHIPPED | OUTPATIENT
Start: 2025-05-09

## 2025-05-09 RX ORDER — ALLOPURINOL 300 MG/1
TABLET ORAL
Qty: 60 TABLET | Refills: 0 | Status: SHIPPED | OUTPATIENT
Start: 2025-05-09

## 2025-05-09 NOTE — TELEPHONE ENCOUNTER
Received request via: Pharmacy    Was the patient seen in the last year in this department? No last seen on 09/19/2023    Does the patient have an active prescription (recently filled or refills available) for medication(s) requested? No    Pharmacy Name: [facility] [address] [place] [address], [place] [facility] [id number] [facility]     Does the patient have custodial Plus and need 100-day supply? (This applies to ALL medications) Patient does not have SCP

## 2025-08-19 DIAGNOSIS — M1A.09X1 IDIOPATHIC CHRONIC GOUT OF MULTIPLE SITES WITH TOPHUS: ICD-10-CM

## 2025-08-20 RX ORDER — ALLOPURINOL 300 MG/1
TABLET ORAL
Qty: 60 TABLET | Refills: 0 | Status: SHIPPED | OUTPATIENT
Start: 2025-08-20

## 2025-08-20 RX ORDER — COLCHICINE 0.6 MG/1
0.6 TABLET ORAL DAILY
Qty: 60 TABLET | Refills: 0 | Status: SHIPPED | OUTPATIENT
Start: 2025-08-20

## (undated) DEVICE — GOWN WARMING STANDARD FLEX - (30/CA)

## (undated) DEVICE — BLADE SURGICAL #15 - (50/BX 3BX/CA)

## (undated) DEVICE — CHLORAPREP 26 ML APPLICATOR - ORANGE TINT(25/CA)

## (undated) DEVICE — SUTURE 1 VICRYL PLUS CTX - 36 INCH (36/BX)

## (undated) DEVICE — DRAPE MAYO STAND - (30/CA)

## (undated) DEVICE — MEDICINE CUP STERILE 2 OZ - (100/CA)

## (undated) DEVICE — TUBE E-T HI-LO CUFF 8.5MM (10EA/PK)

## (undated) DEVICE — NEEDLE NON SAFETY 25 GA X 1 1/2 IN HYPO (100EA/BX)

## (undated) DEVICE — TUBING CLEARLINK DUO-VENT - C-FLO (48EA/CA)

## (undated) DEVICE — MASK ANESTHESIA ADULT  - (100/CA)

## (undated) DEVICE — SPONGE GAUZE STER 4X4 8-PL - (2/PK 50PK/BX 12BX/CS)

## (undated) DEVICE — SLEEVE, VASO, THIGH, MED

## (undated) DEVICE — NEPTUNE 4 PORT MANIFOLD - (20/PK)

## (undated) DEVICE — SUTURE 3-0 VICRYL PLUS SH - 8X 18 INCH (12/BX)

## (undated) DEVICE — SUTURE 2-0 ETHILON FS - (36/BX) 18 INCH

## (undated) DEVICE — SUTURE 0 COATED VICRYL 6-18IN - (12PK/BX)

## (undated) DEVICE — ELECTRODE DUAL RETURN W/ CORD - (50/PK)

## (undated) DEVICE — LACTATED RINGERS INJ 1000 ML - (14EA/CA 60CA/PF)

## (undated) DEVICE — GUIDE TRACHE TUBE INTUBATING STYLET 5.0-10.0MM 14FR (20EA/PK)

## (undated) DEVICE — SUTURE GENERAL

## (undated) DEVICE — PROTECTOR ULNA NERVE - (36PR/CA)

## (undated) DEVICE — SUTURE 3-0 SILK SH (12PK/BX)

## (undated) DEVICE — STAPLE 60MM GREEN 4.8MM (12EA/BX)

## (undated) DEVICE — TRAY CATHETER FOLEY URINE METER W/STATLOCK 350ML (10EA/CA)

## (undated) DEVICE — GLOVE BIOGEL INDICATOR SZ 6.5 SURGICAL PF LTX - (50PR/BX 4BX/CA)

## (undated) DEVICE — SET LEADWIRE 5 LEAD BEDSIDE DISPOSABLE ECG (1SET OF 5/EA)

## (undated) DEVICE — CANISTER SUCTION 3000ML MECHANICAL FILTER AUTO SHUTOFF MEDI-VAC NONSTERILE LF DISP  (40EA/CA)

## (undated) DEVICE — SPONGE GAUZESTER 4 X 4 4PLY - (128PK/CA)

## (undated) DEVICE — BOVIE  BLADE 6 EXTENDED - (50/PK)

## (undated) DEVICE — SENSOR SPO2 NEO LNCS ADHESIVE (20/BX) SEE USER NOTES

## (undated) DEVICE — ELECTRODE 850 FOAM ADHESIVE - HYDROGEL RADIOTRNSPRNT (50/PK)

## (undated) DEVICE — SPONGE DRAIN 4 X 4IN 6-PLY - (2/PK25PK/BX12BX/CS)

## (undated) DEVICE — HEAD HOLDER JUNIOR/ADULT

## (undated) DEVICE — GLOVE BIOGEL SZ 6.5 SURGICAL PF LTX (50PR/BX 4BX/CA)

## (undated) DEVICE — SUTURE 2-0 VICRYL PLUS SH - 8 X 18 INCH (12/BX)

## (undated) DEVICE — LIGASURE TISSUE FUSION  - SINGLE USE (6/CA)

## (undated) DEVICE — DRAIN PENROSE 3/8 IN X 12 IN - STERILE (25EA/BX)

## (undated) DEVICE — PACK MAJOR BASIN - (2EA/CA)

## (undated) DEVICE — SODIUM CHL IRRIGATION 0.9% 1000ML (12EA/CA)

## (undated) DEVICE — SUTURE 3-0 VICRYL PLUS SH - 27 INCH (36/BX)

## (undated) DEVICE — KIT ANESTHESIA W/CIRCUIT & 3/LT BAG W/FILTER (20EA/CA)

## (undated) DEVICE — SUTURE 2-0 PROLENE SH (36PK/BX)

## (undated) DEVICE — BLADE SURGICAL CLIPPER - (50EA/CA)

## (undated) DEVICE — DRAPE UNDER BUTTOCK LRG (40/CA)

## (undated) DEVICE — NEEDLE INSFL 120MM 14GA VRRS - (20/BX)

## (undated) DEVICE — STAPLER SKIN DISP - (6/BX 10BX/CA) VISISTAT

## (undated) DEVICE — LEGGING LITHOTOMY 31 X 48 IN - (2EA/PK 20PK/CA)

## (undated) DEVICE — TUBE CONNECT SUCTION CLEAR 120 X 1/4" (50EA/CA)"

## (undated) DEVICE — SUCTION INSTRUMENT YANKAUER BULBOUS TIP W/O VENT (50EA/CA)

## (undated) DEVICE — RESERVOIR SUCTION 100 CC - SILICONE (20EA/CA)

## (undated) DEVICE — STAPLE 75MM LINEAR (12EA/BX)

## (undated) DEVICE — TUBING INSUFFLATION - (10/BX)

## (undated) DEVICE — SET EXTENSION WITH 2 PORTS (48EA/CA) ***PART #2C8610 IS A SUBSTITUTE*****

## (undated) DEVICE — KIT SIGMOIDOSCOPE W/BULB AND - SUCTION (1/PK 10PK/CA)

## (undated) DEVICE — DRAPE LAPAROTOMY T SHEET - (12EA/CA)

## (undated) DEVICE — BLADE SURGICAL #10 - (50/BX)

## (undated) DEVICE — TROCAR 5X100 BLADED Z-THREAD - KII (6/BX)

## (undated) DEVICE — BLOCK

## (undated) DEVICE — JELLY, KY 5GM TUBES

## (undated) DEVICE — SUTURE 4-0 VICRYL PLUS FS-2 - 27 INCH (36/BX)

## (undated) DEVICE — TUBE, CULTURE AEROBIC

## (undated) DEVICE — SYRINGE 10 ML CONTROL LL (25EA/BX 4BX/CA)

## (undated) DEVICE — CANNULA W/SEAL 5X100 Z-THRE - ADED KII (12/BX)

## (undated) DEVICE — SOD. CHL. INJ. 0.9% 1000 ML - (14EA/CA 60CA/PF)

## (undated) DEVICE — SUTURE  0 ETHIBOND CT-1 30 IN (36PK/BX)

## (undated) DEVICE — TOWELS CLOTH SURGICAL - (4/PK 20PK/CA)

## (undated) DEVICE — STAPLER 60MM OPEN GREEN 4.8 - W/STAPLE (3/BX)

## (undated) DEVICE — KIT ROOM DECONTAMINATION

## (undated) DEVICE — TRAY, CV CATH MULTI-LUM.AK-25703

## (undated) DEVICE — TRANSDUCER ADULT DISP. SINGLE BONDED STERILE - (20EA/CA)

## (undated) DEVICE — GOWN SURGEONS LARGE - (32/CA)

## (undated) DEVICE — SUTURE 1 VICRYL PLUS CTX - 8 X 18 INCH (12/BX)

## (undated) DEVICE — GLOVE BIOGEL SZ 6 PF LATEX - (50EA/BX 4BX/CA)

## (undated) DEVICE — SUCTION INSTRUMENT YANKAUER OPEN TIP W/O VENT (50EA/CA)

## (undated) DEVICE — GLOVE SZ 6.5 BIOGEL PI MICRO - PF LF (50PR/BX)

## (undated) DEVICE — SUTURE 2-0 SILK SH (36PK/BX)

## (undated) DEVICE — STAPLER 75MM LINEAR OPEN (3EA/BX)

## (undated) DEVICE — SET SUCTION/IRRIGATION WITH DISPOSABLE TIP (6/CA )PART #0250-070-520 IS A SUB

## (undated) DEVICE — GRAFT MESH SEPRAFILM PRO PACK - 5/BX CONTAINS 6 3X5 PIECES

## (undated) DEVICE — GLOVE BIOGEL PI INDICATOR SZ 6.5 SURGICAL PF LF - (50/BX 4BX/CA)

## (undated) DEVICE — TRAY SKIN SCRUB PVP WET (20EA/CA) PART #DYND70356 DISCONTINUED

## (undated) DEVICE — PAD LAP STERILE 18 X 18 - (5/PK 40PK/CA)

## (undated) DEVICE — KIT RADIAL ARTERY 20GA W/MAX BARRIER AND BIOPATCH  (5EA/CA) #10740 IS FOR THE SET RADIAL ARTERIAL

## (undated) DEVICE — GLOVE BIOGEL SZ 7.5 SURGICAL PF LTX - (50PR/BX 4BX/CA)

## (undated) DEVICE — BAG, SPONGE COUNT 50600

## (undated) DEVICE — SUTURE 3-0 SILK SH C/R 18 IN - (12/BX)

## (undated) DEVICE — PACK LAP CHOLE OR - (2EA/CA)

## (undated) DEVICE — TUBE NG SALEM SUMP 16FR (50EA/CA)

## (undated) DEVICE — GOWN SURGEONS X-LARGE - DISP. (30/CA)

## (undated) DEVICE — DRAIN FLAT SUCTION 10MMX20CM - LATEX FREE (10EA/CA)

## (undated) DEVICE — SWAB ANAEROBIC SPEC.COLLECTOR - (25/PK 4PK/CA 100EA/CA)